# Patient Record
Sex: FEMALE | Race: WHITE | Employment: OTHER | RURAL
[De-identification: names, ages, dates, MRNs, and addresses within clinical notes are randomized per-mention and may not be internally consistent; named-entity substitution may affect disease eponyms.]

---

## 2017-01-19 ENCOUNTER — HOSPITAL ENCOUNTER (OUTPATIENT)
Dept: OTHER | Age: 82
Discharge: HOME | End: 2017-01-19
Attending: NURSE PRACTITIONER | Admitting: NURSE PRACTITIONER

## 2017-01-19 DIAGNOSIS — R09.89 ABNORMAL CHEST SOUNDS: ICD-10-CM

## 2017-03-17 PROBLEM — E78.5 HYPERLIPIDEMIA: Status: ACTIVE | Noted: 2017-03-17

## 2017-03-17 PROBLEM — G45.9 TIA (TRANSIENT ISCHEMIC ATTACK): Status: ACTIVE | Noted: 2017-03-17

## 2017-03-17 PROBLEM — I10 HYPERTENSION: Status: ACTIVE | Noted: 2017-03-17

## 2017-03-17 PROBLEM — I25.10 CAD (CORONARY ARTERY DISEASE): Status: ACTIVE | Noted: 2017-03-17

## 2017-03-17 PROBLEM — I48.91 ATRIAL FIBRILLATION (HCC): Status: ACTIVE | Noted: 2017-03-17

## 2017-06-02 ENCOUNTER — OFFICE VISIT (OUTPATIENT)
Dept: ONCOLOGY | Facility: CLINIC | Age: 82
End: 2017-06-02

## 2017-06-02 ENCOUNTER — LAB (OUTPATIENT)
Dept: LAB | Facility: HOSPITAL | Age: 82
End: 2017-06-02

## 2017-06-02 VITALS
SYSTOLIC BLOOD PRESSURE: 175 MMHG | TEMPERATURE: 97.5 F | WEIGHT: 134 LBS | HEART RATE: 70 BPM | DIASTOLIC BLOOD PRESSURE: 72 MMHG | RESPIRATION RATE: 19 BRPM

## 2017-06-02 DIAGNOSIS — D50.9 IRON DEFICIENCY ANEMIA, UNSPECIFIED IRON DEFICIENCY ANEMIA TYPE: ICD-10-CM

## 2017-06-02 DIAGNOSIS — D50.9 IRON DEFICIENCY ANEMIA, UNSPECIFIED IRON DEFICIENCY ANEMIA TYPE: Primary | ICD-10-CM

## 2017-06-02 LAB
BASOPHILS # BLD AUTO: 0.02 10*3/MM3 (ref 0–0.2)
BASOPHILS NFR BLD AUTO: 0.3 % (ref 0–2.5)
DEPRECATED RDW RBC AUTO: 48 FL (ref 37–54)
EOSINOPHIL # BLD AUTO: 0.24 10*3/MM3 (ref 0–0.7)
EOSINOPHIL NFR BLD AUTO: 3 % (ref 0–7)
ERYTHROCYTE [DISTWIDTH] IN BLOOD BY AUTOMATED COUNT: 14 % (ref 11.5–14.5)
FERRITIN SERPL-MCNC: 15.6 NG/ML (ref 11.1–264)
HCT VFR BLD AUTO: 33.5 % (ref 37–47)
HGB BLD-MCNC: 10.4 G/DL (ref 12–16)
IMM GRANULOCYTES # BLD: 0.04 10*3/MM3 (ref 0–0.06)
IMM GRANULOCYTES NFR BLD: 0.5 % (ref 0–0.6)
IRON 24H UR-MRATE: 53 MCG/DL (ref 37–181)
IRON SATN MFR SERPL: 16 % (ref 11–46)
LYMPHOCYTES # BLD AUTO: 1.25 10*3/MM3 (ref 0.6–3.4)
LYMPHOCYTES NFR BLD AUTO: 15.8 % (ref 10–50)
MCH RBC QN AUTO: 29.3 PG (ref 27–31)
MCHC RBC AUTO-ENTMCNC: 31 G/DL (ref 30–37)
MCV RBC AUTO: 94.4 FL (ref 81–99)
MONOCYTES # BLD AUTO: 0.71 10*3/MM3 (ref 0–0.9)
MONOCYTES NFR BLD AUTO: 9 % (ref 0–12)
NEUTROPHILS # BLD AUTO: 5.67 10*3/MM3 (ref 2–6.9)
NEUTROPHILS NFR BLD AUTO: 71.4 % (ref 37–80)
NRBC BLD MANUAL-RTO: 0 /100 WBC (ref 0–0)
PLATELET # BLD AUTO: 200 10*3/MM3 (ref 130–400)
PMV BLD AUTO: 10.7 FL (ref 6–12)
RBC # BLD AUTO: 3.55 10*6/MM3 (ref 4.2–5.4)
TIBC SERPL-MCNC: 329 MCG/DL (ref 261–497)
WBC NRBC COR # BLD: 7.93 10*3/MM3 (ref 4.8–10.8)

## 2017-06-02 PROCEDURE — 83550 IRON BINDING TEST: CPT | Performed by: INTERNAL MEDICINE

## 2017-06-02 PROCEDURE — 99213 OFFICE O/P EST LOW 20 MIN: CPT | Performed by: INTERNAL MEDICINE

## 2017-06-02 PROCEDURE — 36415 COLL VENOUS BLD VENIPUNCTURE: CPT

## 2017-06-02 PROCEDURE — 83540 ASSAY OF IRON: CPT | Performed by: INTERNAL MEDICINE

## 2017-06-02 PROCEDURE — 82728 ASSAY OF FERRITIN: CPT | Performed by: INTERNAL MEDICINE

## 2017-06-02 PROCEDURE — 85025 COMPLETE CBC W/AUTO DIFF WBC: CPT | Performed by: INTERNAL MEDICINE

## 2017-06-02 NOTE — PROGRESS NOTES
PROBLEM LIST:  1. Iron deficiency anemia:   a) The patient it initially presented with an incidental finding of a  hemoglobin of 6 on routine labs in 09/2012. She received a blood transfusion  and was started on oral iron. Upper endoscopy reportedly was unremarkable at  that time. She believes she also had a CT scan of the abdomen. Colonoscopy was  done in 12/2013, which did not show any evidence of blood loss. Her hemoglobin  got up to 12 to 13 with iron supplementation. It is dropping again and she was  initially seen for consultation in 10/2014 with a hemoglobin of 9.4, ferritin  9, iron saturation 4%.   b) Feraheme infusion was given 11/2014.  c) Capsule endoscopy on 12/11/2014 showed evidence of hematin in the stomach  and duodenal erosions. Repeat push enteroscopy was recommended. Repeat upper  endoscopy on 04/06/2015 showed 2 ulcerated lesions in the gastric antrum.   Biopsies were performed and a PPI was started.   d) Feraheme infusion given in 05/2015.   e) Feraheme infusion repeated in 09/2015 for dropping ferritin.   2. Diabetes.   3. Hypertension.   4. Atrial fibrillation.   5. Hyperlipidemia.   6. History of transient ischemic attack.   7. Coronary artery disease.     Subjective     HISTORY OF PRESENT ILLNESS:   Analliia De Leon returns for follow-up.   She is doing pretty well.  She thinks she feels about the same.  She would like to have more energy.  She continues to take ferrous sulfate twice daily.    Past Medical History, Past Surgical History, Social History, Family History have been reviewed and are without significant changes except as mentioned.    Review of Systems   A comprehensive 14 point review of systems was performed and was negative except as mentioned.    Medications:  The current medication list was reviewed in the EMR    ALLERGIES:    Allergies   Allergen Reactions   • Amoxicillin    • Crestor [Rosuvastatin]    • Eliquis [Apixaban]    • Keflex [Cephalexin]    • Motrin [Ibuprofen]     • Vasotec [Enalapril Maleate]        Objective      /72  Pulse 70  Temp 97.5 °F (36.4 °C) (Temporal Artery )   Resp 19  Wt 134 lb (60.8 kg)     Performance Status: 1    General: well appearing female in no acute distress  Neuro: alert and oriented  HEENT: sclera anicteric, oropharynx clear  Lymphatics: no cervical, supraclavicular, or axillary adenopathy  Cardiovascular: regular rate and rhythm, no murmurs  Lungs: clear to auscultation bilaterally  Abdomen: soft, nontender, nondistended.  No palpable organomegaly  Extremeties: Well healed left anterior knee scar.  Skin: no rashes, lesions, bruising, or petechiae  Psych: mood and affect appropriate      RECENT LABS:  Blood work was reviewed and is notable for [id number], ferritin 15.6, iron sat 16%         Assessment/Plan   Analilia De Leon is a 82 y.o. year old female with a history of chronic iron deficiency anemia.  Her hemoglobin remains stable.  Her iron levels are normal.  I recommend that she continue taking the iron supplement twice daily.  I will not schedule hematology follow up but i would be happy to see her at any point in the future if there is a significant change in her blood work.  I recommend that she get CBC and iron studies checked twice yearly.                    Visit time was 15 minutes, greater than 50% spent in counseling      Keila Doran MD  [facility]    6/2/2017          CC:

## 2017-06-06 ENCOUNTER — OFFICE VISIT (OUTPATIENT)
Dept: NEUROLOGY | Facility: CLINIC | Age: 82
End: 2017-06-06

## 2017-06-06 VITALS
OXYGEN SATURATION: 98 % | HEIGHT: 63 IN | SYSTOLIC BLOOD PRESSURE: 155 MMHG | HEART RATE: 75 BPM | BODY MASS INDEX: 23.39 KG/M2 | DIASTOLIC BLOOD PRESSURE: 87 MMHG | WEIGHT: 132 LBS

## 2017-06-06 DIAGNOSIS — R27.0 ATAXIA: ICD-10-CM

## 2017-06-06 DIAGNOSIS — H81.10 BPPV (BENIGN PAROXYSMAL POSITIONAL VERTIGO), UNSPECIFIED LATERALITY: Primary | ICD-10-CM

## 2017-06-06 DIAGNOSIS — I73.9 MICROANGIOPATHY (HCC): ICD-10-CM

## 2017-06-06 DIAGNOSIS — R53.1 WEAKNESS: ICD-10-CM

## 2017-06-06 PROCEDURE — 99215 OFFICE O/P EST HI 40 MIN: CPT | Performed by: PSYCHIATRY & NEUROLOGY

## 2017-06-06 NOTE — PROGRESS NOTES
Subjective:     Patient ID: Analilia De Leon is a 82 y.o. female.    History of Present Illness  The following portions of the patient's history were reviewed and updated as appropriate: allergies, current medications, past family history, past medical history, past social history, past surgical history and problem list.  83 y/o WF has had intermittent dizziness and vertigo for several months, feels swimmy headed with movement lasting for a few seconds. She has been using a walker and a wheelchair in part because of history of knee surgery. Denies history of a stroke. MRI of brain in April showed chronic white matter changes, question of a tiny acute lacunar stroke vs artifact. She has history of Afib, sees cardiology, on Xarelto.  Review of Systems   Constitutional: Positive for fatigue. Negative for chills, fever and unexpected weight change.   HENT: Negative for ear pain, hearing loss, nosebleeds, rhinorrhea and sore throat.    Eyes: Negative for photophobia, pain, discharge, itching and visual disturbance.   Respiratory: Negative for cough, chest tightness, shortness of breath and wheezing.    Cardiovascular: Negative for chest pain, palpitations and leg swelling.   Gastrointestinal: Negative for abdominal pain, blood in stool, constipation, diarrhea, nausea and vomiting.   Genitourinary: Positive for vaginal discharge. Negative for dysuria, frequency, hematuria and urgency.   Musculoskeletal: Negative for arthralgias, back pain, gait problem, joint swelling, myalgias, neck pain and neck stiffness.   Skin: Negative for rash and wound.        DRY SKIN   Allergic/Immunologic: Negative for environmental allergies and food allergies.   Neurological: Negative for dizziness, tremors, seizures, syncope, speech difficulty, weakness, light-headedness, numbness and headaches.   Hematological: Negative for adenopathy. Does not bruise/bleed easily.   Psychiatric/Behavioral: Negative for agitation, confusion, decreased  concentration, hallucinations, sleep disturbance and suicidal ideas. The patient is not nervous/anxious.         Objective:    Neurologic Exam     Mental Status   Oriented to person, place, and time.     Cranial Nerves     CN III, IV, VI   Pupils are equal, round, and reactive to light.  Extraocular motions are normal.     Motor Exam     Strength   Strength 5/5 throughout.       Physical Exam   Constitutional: She is oriented to person, place, and time.   Poorly hydrated   HENT:   Head: Normocephalic and atraumatic.   Eyes: EOM are normal. Pupils are equal, round, and reactive to light.   Neck: Carotid bruit is not present.   Cardiovascular: Normal rate, regular rhythm, normal heart sounds and intact distal pulses.    Pulmonary/Chest: Effort normal and breath sounds normal.   Abdominal: Soft. Bowel sounds are normal.   Musculoskeletal: Normal range of motion.   Neurological: She is alert and oriented to person, place, and time. She has normal strength and normal reflexes. No cranial nerve deficit or sensory deficit. Coordination and gait normal. She displays no Babinski's sign on the right side. She displays no Babinski's sign on the left side.   Gets up with difficulty, unsteady gait, severe inducible vertigo and nystagmus with right ear down, again on sitting up, resolves in seconds.   Skin: Skin is warm.   Psychiatric: She has a normal mood and affect. Her behavior is normal. Thought content normal. Cognition and memory are normal.       Assessment/Plan:     Analilia was seen today for dizziness.    Diagnoses and all orders for this visit:    BPPV (benign paroxysmal positional vertigo), unspecified laterality  -     Ambulatory Referral to Physical Therapy    Ataxia  -     Ambulatory Referral to Physical Therapy    Weakness  -     Ambulatory Referral to Physical Therapy    Microangiopathy       Patient would benefit from vestibular therapy, Epley's maneuver, balance/gait training and strengthening. She is to exercise  fall precautions. Patient and son are encouraged to call for problems.

## 2018-07-26 ENCOUNTER — TRANSCRIBE ORDERS (OUTPATIENT)
Dept: ADMINISTRATIVE | Facility: HOSPITAL | Age: 83
End: 2018-07-26

## 2018-07-26 DIAGNOSIS — Z12.39 SCREENING BREAST EXAMINATION: Primary | ICD-10-CM

## 2018-09-06 ENCOUNTER — APPOINTMENT (OUTPATIENT)
Dept: MAMMOGRAPHY | Facility: HOSPITAL | Age: 83
End: 2018-09-06

## 2018-10-04 ENCOUNTER — HOSPITAL ENCOUNTER (OUTPATIENT)
Dept: MAMMOGRAPHY | Facility: HOSPITAL | Age: 83
Discharge: HOME | End: 2018-10-04
Admitting: NURSE PRACTITIONER

## 2018-10-04 DIAGNOSIS — Z12.39 SCREENING BREAST EXAMINATION: ICD-10-CM

## 2018-10-04 PROCEDURE — 77063 BREAST TOMOSYNTHESIS BI: CPT

## 2018-10-04 PROCEDURE — 77067 SCR MAMMO BI INCL CAD: CPT

## 2020-08-11 ENCOUNTER — HOSPITAL ENCOUNTER (INPATIENT)
Facility: HOSPITAL | Age: 85
LOS: 8 days | Discharge: NURSING, REHAB OR LONG-TERM CARE | End: 2020-08-19
Attending: FAMILY MEDICINE | Admitting: ORTHOPAEDIC SURGERY

## 2020-08-11 ENCOUNTER — APPOINTMENT (OUTPATIENT)
Dept: GENERAL RADIOLOGY | Facility: HOSPITAL | Age: 85
End: 2020-08-11

## 2020-08-11 ENCOUNTER — APPOINTMENT (OUTPATIENT)
Dept: CT IMAGING | Facility: HOSPITAL | Age: 85
End: 2020-08-11

## 2020-08-11 ENCOUNTER — APPOINTMENT (OUTPATIENT)
Dept: OTHER | Facility: HOSPITAL | Age: 85
End: 2020-08-11

## 2020-08-11 DIAGNOSIS — Z78.9 IMPAIRED MOBILITY AND ADLS: ICD-10-CM

## 2020-08-11 DIAGNOSIS — Z74.09 IMPAIRED MOBILITY AND ADLS: ICD-10-CM

## 2020-08-11 DIAGNOSIS — E11.42 DIABETIC PERIPHERAL NEUROPATHY (HCC): ICD-10-CM

## 2020-08-11 DIAGNOSIS — Z96.649 STATUS POST HIP HEMIARTHROPLASTY: ICD-10-CM

## 2020-08-11 DIAGNOSIS — E87.5 HYPERKALEMIA: Primary | ICD-10-CM

## 2020-08-11 PROBLEM — S72.009A HIP FRACTURE (HCC): Status: ACTIVE | Noted: 2020-08-11

## 2020-08-11 PROBLEM — R27.0 ATAXIA: Status: RESOLVED | Noted: 2017-06-06 | Resolved: 2020-08-11

## 2020-08-11 PROBLEM — R41.82 ALTERED MENTAL STATUS: Status: ACTIVE | Noted: 2020-08-11

## 2020-08-11 PROBLEM — I73.9 MICROANGIOPATHY (HCC): Status: RESOLVED | Noted: 2017-06-06 | Resolved: 2020-08-11
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
## 2020-08-11 PROBLEM — H81.10 BPPV (BENIGN PAROXYSMAL POSITIONAL VERTIGO): Status: RESOLVED | Noted: 2017-06-06 | Resolved: 2020-08-11

## 2020-08-11 PROBLEM — G45.9 TIA (TRANSIENT ISCHEMIC ATTACK): Status: RESOLVED | Noted: 2017-03-17 | Resolved: 2020-08-11

## 2020-08-11 PROBLEM — R53.1 WEAKNESS: Status: RESOLVED | Noted: 2017-06-06 | Resolved: 2020-08-11

## 2020-08-11 PROBLEM — N39.0 ACUTE UTI (URINARY TRACT INFECTION): Status: ACTIVE | Noted: 2020-08-11

## 2020-08-11 LAB
ALBUMIN SERPL-MCNC: 4.1 G/DL (ref 3.5–5.2)
ALBUMIN/GLOB SERPL: 1.8 G/DL
ALP SERPL-CCNC: 100 U/L (ref 39–117)
ALT SERPL W P-5'-P-CCNC: 15 U/L (ref 1–33)
ANION GAP SERPL CALCULATED.3IONS-SCNC: 11 MMOL/L (ref 5–15)
APTT PPP: 36.7 SECONDS (ref 50–95)
AST SERPL-CCNC: 23 U/L (ref 1–32)
BASOPHILS # BLD AUTO: 0.03 10*3/MM3 (ref 0–0.2)
BASOPHILS NFR BLD AUTO: 0.2 % (ref 0–1.5)
BILIRUB SERPL-MCNC: 0.3 MG/DL (ref 0–1.2)
BUN SERPL-MCNC: 13 MG/DL (ref 8–23)
BUN/CREAT SERPL: 13.1 (ref 7–25)
CALCIUM SPEC-SCNC: 9.2 MG/DL (ref 8.6–10.5)
CHLORIDE SERPL-SCNC: 106 MMOL/L (ref 98–107)
CO2 SERPL-SCNC: 21 MMOL/L (ref 22–29)
CREAT SERPL-MCNC: 0.99 MG/DL (ref 0.57–1)
DEPRECATED RDW RBC AUTO: 50.5 FL (ref 37–54)
EOSINOPHIL # BLD AUTO: 0.29 10*3/MM3 (ref 0–0.4)
EOSINOPHIL NFR BLD AUTO: 2 % (ref 0.3–6.2)
ERYTHROCYTE [DISTWIDTH] IN BLOOD BY AUTOMATED COUNT: 14.6 % (ref 12.3–15.4)
GFR SERPL CREATININE-BSD FRML MDRD: 53 ML/MIN/1.73
GLOBULIN UR ELPH-MCNC: 2.3 GM/DL
GLUCOSE SERPL-MCNC: 178 MG/DL (ref 65–99)
HCT VFR BLD AUTO: 32 % (ref 34–46.6)
HGB BLD-MCNC: 9.3 G/DL (ref 12–15.9)
HOLD SPECIMEN: NORMAL
IMM GRANULOCYTES # BLD AUTO: 0.09 10*3/MM3 (ref 0–0.05)
IMM GRANULOCYTES NFR BLD AUTO: 0.6 % (ref 0–0.5)
INR PPP: 1.28 (ref 0.85–1.16)
LYMPHOCYTES # BLD AUTO: 0.68 10*3/MM3 (ref 0.7–3.1)
LYMPHOCYTES NFR BLD AUTO: 4.7 % (ref 19.6–45.3)
MAGNESIUM SERPL-MCNC: 1.6 MG/DL (ref 1.6–2.4)
MCH RBC QN AUTO: 27.8 PG (ref 26.6–33)
MCHC RBC AUTO-ENTMCNC: 29.1 G/DL (ref 31.5–35.7)
MCV RBC AUTO: 95.8 FL (ref 79–97)
MONOCYTES # BLD AUTO: 1.37 10*3/MM3 (ref 0.1–0.9)
MONOCYTES NFR BLD AUTO: 9.4 % (ref 5–12)
NEUTROPHILS NFR BLD AUTO: 12.12 10*3/MM3 (ref 1.7–7)
NEUTROPHILS NFR BLD AUTO: 83.1 % (ref 42.7–76)
NRBC BLD AUTO-RTO: 0 /100 WBC (ref 0–0.2)
PLATELET # BLD AUTO: 225 10*3/MM3 (ref 140–450)
PMV BLD AUTO: 11.3 FL (ref 6–12)
POTASSIUM SERPL-SCNC: 5.6 MMOL/L (ref 3.5–5.2)
PROCALCITONIN SERPL-MCNC: 0.07 NG/ML (ref 0–0.25)
PROT SERPL-MCNC: 6.4 G/DL (ref 6–8.5)
PROTHROMBIN TIME: 15.7 SECONDS (ref 11.5–14)
RBC # BLD AUTO: 3.34 10*6/MM3 (ref 3.77–5.28)
SODIUM SERPL-SCNC: 138 MMOL/L (ref 136–145)
TROPONIN T SERPL-MCNC: <0.01 NG/ML (ref 0–0.03)
WBC # BLD AUTO: 14.58 10*3/MM3 (ref 3.4–10.8)
WHOLE BLOOD HOLD SPECIMEN: NORMAL
WHOLE BLOOD HOLD SPECIMEN: NORMAL

## 2020-08-11 PROCEDURE — 99223 1ST HOSP IP/OBS HIGH 75: CPT | Performed by: INTERNAL MEDICINE

## 2020-08-11 PROCEDURE — 80053 COMPREHEN METABOLIC PANEL: CPT | Performed by: NURSE PRACTITIONER

## 2020-08-11 PROCEDURE — 82962 GLUCOSE BLOOD TEST: CPT

## 2020-08-11 PROCEDURE — 93005 ELECTROCARDIOGRAM TRACING: CPT | Performed by: INTERNAL MEDICINE

## 2020-08-11 PROCEDURE — 83735 ASSAY OF MAGNESIUM: CPT | Performed by: NURSE PRACTITIONER

## 2020-08-11 PROCEDURE — 71045 X-RAY EXAM CHEST 1 VIEW: CPT

## 2020-08-11 PROCEDURE — 87040 BLOOD CULTURE FOR BACTERIA: CPT | Performed by: NURSE PRACTITIONER

## 2020-08-11 PROCEDURE — 93005 ELECTROCARDIOGRAM TRACING: CPT | Performed by: NURSE PRACTITIONER

## 2020-08-11 PROCEDURE — 25010000002 MORPHINE PER 10 MG: Performed by: INTERNAL MEDICINE

## 2020-08-11 PROCEDURE — 84484 ASSAY OF TROPONIN QUANT: CPT | Performed by: NURSE PRACTITIONER

## 2020-08-11 PROCEDURE — 86850 RBC ANTIBODY SCREEN: CPT | Performed by: NURSE PRACTITIONER

## 2020-08-11 PROCEDURE — 86901 BLOOD TYPING SEROLOGIC RH(D): CPT | Performed by: NURSE PRACTITIONER

## 2020-08-11 PROCEDURE — 93010 ELECTROCARDIOGRAM REPORT: CPT | Performed by: INTERNAL MEDICINE

## 2020-08-11 PROCEDURE — 85730 THROMBOPLASTIN TIME PARTIAL: CPT | Performed by: NURSE PRACTITIONER

## 2020-08-11 PROCEDURE — 70450 CT HEAD/BRAIN W/O DYE: CPT

## 2020-08-11 PROCEDURE — 85025 COMPLETE CBC W/AUTO DIFF WBC: CPT | Performed by: NURSE PRACTITIONER

## 2020-08-11 PROCEDURE — 84145 PROCALCITONIN (PCT): CPT | Performed by: NURSE PRACTITIONER

## 2020-08-11 PROCEDURE — 86900 BLOOD TYPING SEROLOGIC ABO: CPT | Performed by: NURSE PRACTITIONER

## 2020-08-11 PROCEDURE — 85610 PROTHROMBIN TIME: CPT | Performed by: NURSE PRACTITIONER

## 2020-08-11 RX ORDER — SODIUM CHLORIDE 0.9 % (FLUSH) 0.9 %
10 SYRINGE (ML) INJECTION AS NEEDED
Status: DISCONTINUED | OUTPATIENT
Start: 2020-08-11 | End: 2020-08-19 | Stop reason: HOSPADM

## 2020-08-11 RX ORDER — SODIUM CHLORIDE 0.9 % (FLUSH) 0.9 %
10 SYRINGE (ML) INJECTION EVERY 12 HOURS SCHEDULED
Status: DISCONTINUED | OUTPATIENT
Start: 2020-08-11 | End: 2020-08-19 | Stop reason: HOSPADM

## 2020-08-11 RX ORDER — DEXTROSE MONOHYDRATE 25 G/50ML
25 INJECTION, SOLUTION INTRAVENOUS
Status: DISCONTINUED | OUTPATIENT
Start: 2020-08-11 | End: 2020-08-19 | Stop reason: HOSPADM

## 2020-08-11 RX ORDER — MORPHINE SULFATE 2 MG/ML
2 INJECTION, SOLUTION INTRAMUSCULAR; INTRAVENOUS
Status: DISPENSED | OUTPATIENT
Start: 2020-08-11 | End: 2020-08-13

## 2020-08-11 RX ORDER — NICOTINE POLACRILEX 4 MG
15 LOZENGE BUCCAL
Status: DISCONTINUED | OUTPATIENT
Start: 2020-08-11 | End: 2020-08-19 | Stop reason: HOSPADM

## 2020-08-11 RX ORDER — NALOXONE HCL 0.4 MG/ML
0.2 VIAL (ML) INJECTION AS NEEDED
Status: DISCONTINUED | OUTPATIENT
Start: 2020-08-11 | End: 2020-08-19 | Stop reason: HOSPADM

## 2020-08-11 RX ORDER — SODIUM CHLORIDE 9 MG/ML
75 INJECTION, SOLUTION INTRAVENOUS ONCE
Status: COMPLETED | OUTPATIENT
Start: 2020-08-11 | End: 2020-08-12

## 2020-08-11 RX ORDER — METOPROLOL TARTRATE 5 MG/5ML
2.5 INJECTION INTRAVENOUS ONCE
Status: DISCONTINUED | OUTPATIENT
Start: 2020-08-11 | End: 2020-08-12 | Stop reason: SDUPTHER

## 2020-08-11 RX ORDER — MORPHINE SULFATE 4 MG/ML
3 INJECTION, SOLUTION INTRAMUSCULAR; INTRAVENOUS EVERY 4 HOURS PRN
Status: DISPENSED | OUTPATIENT
Start: 2020-08-11 | End: 2020-08-18

## 2020-08-11 RX ADMIN — MORPHINE SULFATE 3 MG: 4 INJECTION, SOLUTION INTRAMUSCULAR; INTRAVENOUS at 19:59

## 2020-08-11 NOTE — NURSING NOTE
"  ACC REVIEW REPORT: [facility]        PATIENT NAME: Analilia De Leon    PATIENT ID: [id number]        COVID-19 ACC SCREENING       DOES THE PATIENT HAVE A FEVER GREATER THAN OR EQUAL .4: no    IS THE PATIENT EXPERIENCING SHORTNESS OF BREATH: no    DOES THE PATIENT HAVE A COUGH: no    DOES THE PATIENT HAVE ANY OF THE FOLLOWING RISK FACTORS:    EXPOSURE TO SUSPECTED OR KNOWN COVID-19: no    RECENT TRAVEL HISTORY TO ENDEMIC AREA (DOMESTIC/LOCAL): no    IS THE PATIENT A HEALTHCARE WORKER: no    HAS THE PATIENT BEEN TESTED FOR COVID-19: no    DATE TESTED:     LAB TESTING SENT TO:           BED: S 504    BED TYPE: 5F    BED GIVEN TO: Rae FERNÁNDEZ BED GIVEN: 1615    YOB: 1935    AGE: 85    GENDER: F    PREVIOUS ADMIT TO [facility]: yes    PREVIOUS ADMISSION DATE: 2016    PATIENT CLASS:     TODAY'S DATE: 8/11/2020    TRANSFER DATE: 8-11-20    ETA: ~1830    TRANSFERRING FACILITY: [facility]    TRANSFERRING FACILITY PHONE # : [phone]    TRANSFERRING MD: Campbell    DATE/TIME REQUEST RECEIVED: 8-10-20@1600    [facility] RN: Chata Graham    REPORT FROM: Rae Jessica    TIME REPORT TAKEN: 1615    DIAGNOSIS: right hip fx    REASON FOR TRANSFER TO [facility]: higher level of care    TRANSPORTATION: EMS    CLINICAL REASON FOR TRANSFER TO [facility]: 85 y.o. Presented to OSH 8/10 with a fx hip- she was dizzy & fell at home; plans are for surgery 8/12      CLINICAL INFORMATION    HEIGHT: 5'3\"    WEIGHT: 65.32kg    ALLERGIES: amoxicillin, crestor, tramadol, eliquis, keflex, motrin, vasotec    BELCHER: yes, placed in the ED    INFECTIOUS DISEASE:     ISOLATION:     LAST VITAL SIGNS:  TIME: 1615  TEMP: 97.7  PULSE: 70  B/P: 145/68  RESP:     LAB INFORMATION: fsbs today at 1427: 180                                       Ua - + nitrite, 2+ bacteria    CULTURE INFORMATION: urine cx - >100,000 ecoli    MEDS/IV FLUIDS: #18 left ac - SL  meds given in the ED: rocephin x2 doses (last dose was 1gm given at 1456)      "                                     Dilaudid given at 10:36                                          Gabapentin given at 0958                                          keppra (500mg given at 0958 - pt takes 1gm at hs      CARDIAC SYSTEM:    CHEST PAIN: no    RHYTHM: NSR    Is patient taking or has patient been given any drugs that could increase bleeding? yes  (Plavix, Brilinta, Effient, Eliquis, Xarelto, Warfarin, Integrilin, Angiomax)    DRUG: xarelto DOSE/FREQUENCY: last taken 8/10    CARDIAC NOTES: no dizziness reported since in the ED      RESPIRATORY SYSTEM:    LUNG SOUNDS:    OXYGEN: 2 liters    O2 SAT: 100%    RESPIRATORY STATUS: no soa      CNS/MUSCULOSKELETAL    ALERT AND ORIENTED:  Pt has been drowsy since taking dilaudid this morning    CNS/MUSCULOSKELETAL NOTES:      GI//GY      ABDOMINAL PAIN: no    VOMITING: no    DIARRHEA: no    NAUSEA: no    /GY NOTES: pt was held NPO for a while awaiting on decision re: surgery  She's had about 50cc intake today  She's had 600cc UO today    PAST MEDICAL HISTORY: htn, afib, HLD, TIA, cad, DM, BPPV, ataxia, iron deficiency anemia, Rt TKR, GB surgery, hysterectomy, appy    ADDITIONAL NOTES: pt lives at home          Sheron Graham RN  8/11/2020  16:27

## 2020-08-12 ENCOUNTER — ANESTHESIA (OUTPATIENT)
Dept: PERIOP | Facility: HOSPITAL | Age: 85
End: 2020-08-12

## 2020-08-12 ENCOUNTER — APPOINTMENT (OUTPATIENT)
Dept: GENERAL RADIOLOGY | Facility: HOSPITAL | Age: 85
End: 2020-08-12

## 2020-08-12 ENCOUNTER — ANESTHESIA EVENT (OUTPATIENT)
Dept: PERIOP | Facility: HOSPITAL | Age: 85
End: 2020-08-12

## 2020-08-12 PROBLEM — Z96.649 STATUS POST HIP HEMIARTHROPLASTY: Status: ACTIVE | Noted: 2020-08-12

## 2020-08-12 LAB
ABO GROUP BLD: NORMAL
ANION GAP SERPL CALCULATED.3IONS-SCNC: 10 MMOL/L (ref 5–15)
BACTERIA UR QL AUTO: ABNORMAL /HPF
BASOPHILS # BLD AUTO: 0.03 10*3/MM3 (ref 0–0.2)
BASOPHILS NFR BLD AUTO: 0.2 % (ref 0–1.5)
BILIRUB UR QL STRIP: NEGATIVE
BLD GP AB SCN SERPL QL: NEGATIVE
BUN SERPL-MCNC: 18 MG/DL (ref 8–23)
BUN/CREAT SERPL: 20 (ref 7–25)
CALCIUM SPEC-SCNC: 8.9 MG/DL (ref 8.6–10.5)
CHLORIDE SERPL-SCNC: 106 MMOL/L (ref 98–107)
CLARITY UR: CLEAR
CO2 SERPL-SCNC: 24 MMOL/L (ref 22–29)
COLOR UR: YELLOW
CREAT SERPL-MCNC: 0.9 MG/DL (ref 0.57–1)
D-LACTATE SERPL-SCNC: 1 MMOL/L (ref 0.5–2)
DEPRECATED RDW RBC AUTO: 51.8 FL (ref 37–54)
EOSINOPHIL # BLD AUTO: 0.25 10*3/MM3 (ref 0–0.4)
EOSINOPHIL NFR BLD AUTO: 2.1 % (ref 0.3–6.2)
ERYTHROCYTE [DISTWIDTH] IN BLOOD BY AUTOMATED COUNT: 14.6 % (ref 12.3–15.4)
GFR SERPL CREATININE-BSD FRML MDRD: 60 ML/MIN/1.73
GLUCOSE BLDC GLUCOMTR-MCNC: 159 MG/DL (ref 70–130)
GLUCOSE BLDC GLUCOMTR-MCNC: 161 MG/DL (ref 70–130)
GLUCOSE BLDC GLUCOMTR-MCNC: 189 MG/DL (ref 70–130)
GLUCOSE BLDC GLUCOMTR-MCNC: 199 MG/DL (ref 70–130)
GLUCOSE BLDC GLUCOMTR-MCNC: 200 MG/DL (ref 70–130)
GLUCOSE SERPL-MCNC: 150 MG/DL (ref 65–99)
GLUCOSE UR STRIP-MCNC: NEGATIVE MG/DL
HBA1C MFR BLD: 6.8 % (ref 4.8–5.6)
HCT VFR BLD AUTO: 30.3 % (ref 34–46.6)
HGB BLD-MCNC: 8.7 G/DL (ref 12–15.9)
HGB UR QL STRIP.AUTO: ABNORMAL
HYALINE CASTS UR QL AUTO: ABNORMAL /LPF
IMM GRANULOCYTES # BLD AUTO: 0.07 10*3/MM3 (ref 0–0.05)
IMM GRANULOCYTES NFR BLD AUTO: 0.6 % (ref 0–0.5)
KETONES UR QL STRIP: ABNORMAL
LEUKOCYTE ESTERASE UR QL STRIP.AUTO: ABNORMAL
LYMPHOCYTES # BLD AUTO: 0.61 10*3/MM3 (ref 0.7–3.1)
LYMPHOCYTES NFR BLD AUTO: 5 % (ref 19.6–45.3)
MCH RBC QN AUTO: 27.9 PG (ref 26.6–33)
MCHC RBC AUTO-ENTMCNC: 28.7 G/DL (ref 31.5–35.7)
MCV RBC AUTO: 97.1 FL (ref 79–97)
MONOCYTES # BLD AUTO: 1.28 10*3/MM3 (ref 0.1–0.9)
MONOCYTES NFR BLD AUTO: 10.5 % (ref 5–12)
NEUTROPHILS NFR BLD AUTO: 81.6 % (ref 42.7–76)
NEUTROPHILS NFR BLD AUTO: 9.91 10*3/MM3 (ref 1.7–7)
NITRITE UR QL STRIP: NEGATIVE
NRBC BLD AUTO-RTO: 0 /100 WBC (ref 0–0.2)
PH UR STRIP.AUTO: <=5 [PH] (ref 5–8)
PLATELET # BLD AUTO: 206 10*3/MM3 (ref 140–450)
PMV BLD AUTO: 11.1 FL (ref 6–12)
POTASSIUM SERPL-SCNC: 5 MMOL/L (ref 3.5–5.2)
PROT UR QL STRIP: ABNORMAL
RBC # BLD AUTO: 3.12 10*6/MM3 (ref 3.77–5.28)
RBC # UR: ABNORMAL /HPF
REF LAB TEST METHOD: ABNORMAL
RH BLD: POSITIVE
SARS-COV-2 RDRP RESP QL NAA+PROBE: NOT DETECTED
SODIUM SERPL-SCNC: 140 MMOL/L (ref 136–145)
SP GR UR STRIP: 1.01 (ref 1–1.03)
SQUAMOUS #/AREA URNS HPF: ABNORMAL /HPF
T&S EXPIRATION DATE: NORMAL
TRANS CELLS #/AREA URNS HPF: ABNORMAL /HPF
TSH SERPL DL<=0.05 MIU/L-ACNC: 3.97 UIU/ML (ref 0.27–4.2)
UROBILINOGEN UR QL STRIP: ABNORMAL
WBC # BLD AUTO: 12.15 10*3/MM3 (ref 3.4–10.8)
WBC UR QL AUTO: ABNORMAL /HPF

## 2020-08-12 PROCEDURE — 83605 ASSAY OF LACTIC ACID: CPT | Performed by: NURSE PRACTITIONER

## 2020-08-12 PROCEDURE — 82962 GLUCOSE BLOOD TEST: CPT

## 2020-08-12 PROCEDURE — 0SRR019 REPLACEMENT OF RIGHT HIP JOINT, FEMORAL SURFACE WITH METAL SYNTHETIC SUBSTITUTE, CEMENTED, OPEN APPROACH: ICD-10-PCS | Performed by: ORTHOPAEDIC SURGERY

## 2020-08-12 PROCEDURE — 99233 SBSQ HOSP IP/OBS HIGH 50: CPT | Performed by: INTERNAL MEDICINE

## 2020-08-12 PROCEDURE — 25010000002 CEFTRIAXONE PER 250 MG: Performed by: NURSE PRACTITIONER

## 2020-08-12 PROCEDURE — 87086 URINE CULTURE/COLONY COUNT: CPT | Performed by: NURSE PRACTITIONER

## 2020-08-12 PROCEDURE — 25010000002 DEXAMETHASONE PER 1 MG: Performed by: NURSE ANESTHETIST, CERTIFIED REGISTERED

## 2020-08-12 PROCEDURE — 81001 URINALYSIS AUTO W/SCOPE: CPT | Performed by: NURSE PRACTITIONER

## 2020-08-12 PROCEDURE — 85025 COMPLETE CBC W/AUTO DIFF WBC: CPT | Performed by: INTERNAL MEDICINE

## 2020-08-12 PROCEDURE — 83036 HEMOGLOBIN GLYCOSYLATED A1C: CPT | Performed by: INTERNAL MEDICINE

## 2020-08-12 PROCEDURE — 63710000001 INSULIN LISPRO (HUMAN) PER 5 UNITS: Performed by: ORTHOPAEDIC SURGERY

## 2020-08-12 PROCEDURE — 27236 TREAT THIGH FRACTURE: CPT | Performed by: SPECIALIST/TECHNOLOGIST, OTHER

## 2020-08-12 PROCEDURE — C1713 ANCHOR/SCREW BN/BN,TIS/BN: HCPCS | Performed by: ORTHOPAEDIC SURGERY

## 2020-08-12 PROCEDURE — 80048 BASIC METABOLIC PNL TOTAL CA: CPT | Performed by: INTERNAL MEDICINE

## 2020-08-12 PROCEDURE — C1776 JOINT DEVICE (IMPLANTABLE): HCPCS | Performed by: ORTHOPAEDIC SURGERY

## 2020-08-12 PROCEDURE — 25010000002 VANCOMYCIN PER 500 MG: Performed by: ORTHOPAEDIC SURGERY

## 2020-08-12 PROCEDURE — 25010000002 FENTANYL CITRATE (PF) 100 MCG/2ML SOLUTION: Performed by: NURSE ANESTHETIST, CERTIFIED REGISTERED

## 2020-08-12 PROCEDURE — 87635 SARS-COV-2 COVID-19 AMP PRB: CPT | Performed by: NURSE PRACTITIONER

## 2020-08-12 PROCEDURE — 63710000001 INSULIN LISPRO (HUMAN) PER 5 UNITS: Performed by: NURSE PRACTITIONER

## 2020-08-12 PROCEDURE — 25010000002 ONDANSETRON PER 1 MG: Performed by: NURSE ANESTHETIST, CERTIFIED REGISTERED

## 2020-08-12 PROCEDURE — 25010000002 PROPOFOL 10 MG/ML EMULSION: Performed by: NURSE ANESTHETIST, CERTIFIED REGISTERED

## 2020-08-12 PROCEDURE — 73502 X-RAY EXAM HIP UNI 2-3 VIEWS: CPT

## 2020-08-12 PROCEDURE — 25010000002 MORPHINE PER 10 MG: Performed by: INTERNAL MEDICINE

## 2020-08-12 PROCEDURE — 84443 ASSAY THYROID STIM HORMONE: CPT | Performed by: INTERNAL MEDICINE

## 2020-08-12 PROCEDURE — 25010000002 CEFTRIAXONE PER 250 MG: Performed by: ORTHOPAEDIC SURGERY

## 2020-08-12 PROCEDURE — 87040 BLOOD CULTURE FOR BACTERIA: CPT | Performed by: NURSE PRACTITIONER

## 2020-08-12 DEVICE — SMARTSET HIGH PERFORMANCE MV MEDIUM VISCOSITY BONE CEMENT 40G
Type: IMPLANTABLE DEVICE | Site: HIP | Status: FUNCTIONAL
Brand: SMARTSET

## 2020-08-12 DEVICE — DEV CONTRL TISS STRATAFIX SPIRAL PDO BIDIR MO4 36X36CM: Type: IMPLANTABLE DEVICE | Site: HIP | Status: FUNCTIONAL

## 2020-08-12 DEVICE — IMPLANTABLE DEVICE: Type: IMPLANTABLE DEVICE | Site: HIP | Status: FUNCTIONAL

## 2020-08-12 DEVICE — BONE PREPARATION KIT
Type: IMPLANTABLE DEVICE | Site: HIP | Status: FUNCTIONAL
Brand: BIOPREP

## 2020-08-12 DEVICE — TANDEM BIPOLAR COBALT CHROME 44MM                                    OUTER DIAMETER 28MM INNER DIAMETER
Type: IMPLANTABLE DEVICE | Site: HIP | Status: FUNCTIONAL
Brand: TANDEM

## 2020-08-12 DEVICE — INVIS DISTAL CENTRALIZER SIZE 8MM
Type: IMPLANTABLE DEVICE | Site: HIP | Status: FUNCTIONAL
Brand: INVIS

## 2020-08-12 DEVICE — SYNERGY CEMENTED FEMORAL COMPONENT                                    SZ 10
Type: IMPLANTABLE DEVICE | Site: HIP | Status: FUNCTIONAL
Brand: SYNERGY

## 2020-08-12 DEVICE — COBALT CHROME 12/14 TAPER FEMORAL                                    HEAD 28MM + 0: Type: IMPLANTABLE DEVICE | Site: HIP | Status: FUNCTIONAL

## 2020-08-12 RX ORDER — VANCOMYCIN HYDROCHLORIDE 1 G/200ML
15 INJECTION, SOLUTION INTRAVENOUS ONCE
Status: COMPLETED | OUTPATIENT
Start: 2020-08-12 | End: 2020-08-12

## 2020-08-12 RX ORDER — FAMOTIDINE 20 MG/1
20 TABLET, FILM COATED ORAL
Status: COMPLETED | OUTPATIENT
Start: 2020-08-12 | End: 2020-08-12

## 2020-08-12 RX ORDER — SODIUM CHLORIDE 9 MG/ML
150 INJECTION, SOLUTION INTRAVENOUS CONTINUOUS
Status: DISCONTINUED | OUTPATIENT
Start: 2020-08-12 | End: 2020-08-13

## 2020-08-12 RX ORDER — MEPERIDINE HYDROCHLORIDE 25 MG/ML
12.5 INJECTION INTRAMUSCULAR; INTRAVENOUS; SUBCUTANEOUS
Status: DISCONTINUED | OUTPATIENT
Start: 2020-08-12 | End: 2020-08-12 | Stop reason: HOSPADM

## 2020-08-12 RX ORDER — HYDROCODONE BITARTRATE AND ACETAMINOPHEN 5; 325 MG/1; MG/1
1 TABLET ORAL EVERY 4 HOURS PRN
Status: DISCONTINUED | OUTPATIENT
Start: 2020-08-12 | End: 2020-08-19 | Stop reason: HOSPADM

## 2020-08-12 RX ORDER — FENTANYL CITRATE 50 UG/ML
INJECTION, SOLUTION INTRAMUSCULAR; INTRAVENOUS AS NEEDED
Status: DISCONTINUED | OUTPATIENT
Start: 2020-08-12 | End: 2020-08-12 | Stop reason: SURG

## 2020-08-12 RX ORDER — SODIUM CHLORIDE 0.9 % (FLUSH) 0.9 %
10 SYRINGE (ML) INJECTION AS NEEDED
Status: DISCONTINUED | OUTPATIENT
Start: 2020-08-12 | End: 2020-08-12 | Stop reason: HOSPADM

## 2020-08-12 RX ORDER — PROMETHAZINE HYDROCHLORIDE 25 MG/ML
6.25 INJECTION, SOLUTION INTRAMUSCULAR; INTRAVENOUS ONCE AS NEEDED
Status: DISCONTINUED | OUTPATIENT
Start: 2020-08-12 | End: 2020-08-12 | Stop reason: HOSPADM

## 2020-08-12 RX ORDER — ASPIRIN 325 MG
325 TABLET ORAL DAILY
Status: DISCONTINUED | OUTPATIENT
Start: 2020-08-13 | End: 2020-08-13

## 2020-08-12 RX ORDER — DEXAMETHASONE SODIUM PHOSPHATE 4 MG/ML
INJECTION, SOLUTION INTRA-ARTICULAR; INTRALESIONAL; INTRAMUSCULAR; INTRAVENOUS; SOFT TISSUE AS NEEDED
Status: DISCONTINUED | OUTPATIENT
Start: 2020-08-12 | End: 2020-08-12 | Stop reason: SURG

## 2020-08-12 RX ORDER — LIDOCAINE HYDROCHLORIDE 10 MG/ML
INJECTION, SOLUTION EPIDURAL; INFILTRATION; INTRACAUDAL; PERINEURAL AS NEEDED
Status: DISCONTINUED | OUTPATIENT
Start: 2020-08-12 | End: 2020-08-12 | Stop reason: SURG

## 2020-08-12 RX ORDER — SODIUM CHLORIDE 0.9 % (FLUSH) 0.9 %
10 SYRINGE (ML) INJECTION EVERY 12 HOURS SCHEDULED
Status: DISCONTINUED | OUTPATIENT
Start: 2020-08-12 | End: 2020-08-12 | Stop reason: HOSPADM

## 2020-08-12 RX ORDER — ATENOLOL 50 MG/1
50 TABLET ORAL
Status: DISCONTINUED | OUTPATIENT
Start: 2020-08-12 | End: 2020-08-19 | Stop reason: HOSPADM

## 2020-08-12 RX ORDER — ONDANSETRON 2 MG/ML
INJECTION INTRAMUSCULAR; INTRAVENOUS AS NEEDED
Status: DISCONTINUED | OUTPATIENT
Start: 2020-08-12 | End: 2020-08-12 | Stop reason: SURG

## 2020-08-12 RX ORDER — FENTANYL CITRATE 50 UG/ML
50 INJECTION, SOLUTION INTRAMUSCULAR; INTRAVENOUS
Status: DISCONTINUED | OUTPATIENT
Start: 2020-08-12 | End: 2020-08-12 | Stop reason: HOSPADM

## 2020-08-12 RX ORDER — ROCURONIUM BROMIDE 10 MG/ML
INJECTION, SOLUTION INTRAVENOUS AS NEEDED
Status: DISCONTINUED | OUTPATIENT
Start: 2020-08-12 | End: 2020-08-12 | Stop reason: SURG

## 2020-08-12 RX ORDER — PROMETHAZINE HYDROCHLORIDE 25 MG/1
25 SUPPOSITORY RECTAL ONCE AS NEEDED
Status: DISCONTINUED | OUTPATIENT
Start: 2020-08-12 | End: 2020-08-12 | Stop reason: HOSPADM

## 2020-08-12 RX ORDER — VANCOMYCIN HYDROCHLORIDE 1 G/200ML
15 INJECTION, SOLUTION INTRAVENOUS ONCE
Status: CANCELLED | OUTPATIENT
Start: 2020-08-12

## 2020-08-12 RX ORDER — MAGNESIUM HYDROXIDE 1200 MG/15ML
LIQUID ORAL AS NEEDED
Status: DISCONTINUED | OUTPATIENT
Start: 2020-08-12 | End: 2020-08-12 | Stop reason: HOSPADM

## 2020-08-12 RX ORDER — PROMETHAZINE HYDROCHLORIDE 12.5 MG/1
12.5 TABLET ORAL EVERY 6 HOURS PRN
Status: DISCONTINUED | OUTPATIENT
Start: 2020-08-12 | End: 2020-08-19 | Stop reason: HOSPADM

## 2020-08-12 RX ORDER — HYDROMORPHONE HYDROCHLORIDE 1 MG/ML
0.5 INJECTION, SOLUTION INTRAMUSCULAR; INTRAVENOUS; SUBCUTANEOUS
Status: DISCONTINUED | OUTPATIENT
Start: 2020-08-12 | End: 2020-08-12 | Stop reason: HOSPADM

## 2020-08-12 RX ORDER — SODIUM CHLORIDE, SODIUM LACTATE, POTASSIUM CHLORIDE, CALCIUM CHLORIDE 600; 310; 30; 20 MG/100ML; MG/100ML; MG/100ML; MG/100ML
9 INJECTION, SOLUTION INTRAVENOUS CONTINUOUS PRN
Status: DISCONTINUED | OUTPATIENT
Start: 2020-08-12 | End: 2020-08-19 | Stop reason: HOSPADM

## 2020-08-12 RX ORDER — LIDOCAINE HYDROCHLORIDE 10 MG/ML
0.5 INJECTION, SOLUTION EPIDURAL; INFILTRATION; INTRACAUDAL; PERINEURAL ONCE AS NEEDED
Status: DISCONTINUED | OUTPATIENT
Start: 2020-08-12 | End: 2020-08-12 | Stop reason: HOSPADM

## 2020-08-12 RX ORDER — LABETALOL HYDROCHLORIDE 5 MG/ML
INJECTION, SOLUTION INTRAVENOUS AS NEEDED
Status: DISCONTINUED | OUTPATIENT
Start: 2020-08-12 | End: 2020-08-12 | Stop reason: SURG

## 2020-08-12 RX ORDER — FAMOTIDINE 10 MG/ML
20 INJECTION, SOLUTION INTRAVENOUS
Status: COMPLETED | OUTPATIENT
Start: 2020-08-12 | End: 2020-08-12

## 2020-08-12 RX ORDER — PROPOFOL 10 MG/ML
VIAL (ML) INTRAVENOUS AS NEEDED
Status: DISCONTINUED | OUTPATIENT
Start: 2020-08-12 | End: 2020-08-12 | Stop reason: SURG

## 2020-08-12 RX ORDER — PROMETHAZINE HYDROCHLORIDE 25 MG/1
25 TABLET ORAL ONCE AS NEEDED
Status: DISCONTINUED | OUTPATIENT
Start: 2020-08-12 | End: 2020-08-12 | Stop reason: HOSPADM

## 2020-08-12 RX ADMIN — MORPHINE SULFATE 2 MG: 2 INJECTION, SOLUTION INTRAMUSCULAR; INTRAVENOUS at 10:47

## 2020-08-12 RX ADMIN — INSULIN LISPRO 3 UNITS: 100 INJECTION, SOLUTION INTRAVENOUS; SUBCUTANEOUS at 21:12

## 2020-08-12 RX ADMIN — ONDANSETRON 4 MG: 2 INJECTION INTRAMUSCULAR; INTRAVENOUS at 17:23

## 2020-08-12 RX ADMIN — SODIUM CHLORIDE, POTASSIUM CHLORIDE, SODIUM LACTATE AND CALCIUM CHLORIDE 9 ML/HR: 600; 310; 30; 20 INJECTION, SOLUTION INTRAVENOUS at 15:10

## 2020-08-12 RX ADMIN — SODIUM CHLORIDE, PRESERVATIVE FREE 10 ML: 5 INJECTION INTRAVENOUS at 08:58

## 2020-08-12 RX ADMIN — LIDOCAINE HYDROCHLORIDE 20 MG: 10 INJECTION, SOLUTION EPIDURAL; INFILTRATION; INTRACAUDAL; PERINEURAL at 16:04

## 2020-08-12 RX ADMIN — VANCOMYCIN HYDROCHLORIDE 1 G: 1 INJECTION, SOLUTION INTRAVENOUS at 16:14

## 2020-08-12 RX ADMIN — ATENOLOL 50 MG: 50 TABLET ORAL at 12:38

## 2020-08-12 RX ADMIN — DEXAMETHASONE SODIUM PHOSPHATE 4 MG: 4 INJECTION, SOLUTION INTRAMUSCULAR; INTRAVENOUS at 16:21

## 2020-08-12 RX ADMIN — CEFTRIAXONE SODIUM 1 G: 1 INJECTION, POWDER, FOR SOLUTION INTRAMUSCULAR; INTRAVENOUS at 01:11

## 2020-08-12 RX ADMIN — FAMOTIDINE 20 MG: 10 INJECTION INTRAVENOUS at 14:59

## 2020-08-12 RX ADMIN — FENTANYL CITRATE 25 MCG: 50 INJECTION, SOLUTION INTRAMUSCULAR; INTRAVENOUS at 16:04

## 2020-08-12 RX ADMIN — CEFTRIAXONE SODIUM 1 G: 1 INJECTION, POWDER, FOR SOLUTION INTRAMUSCULAR; INTRAVENOUS at 21:13

## 2020-08-12 RX ADMIN — SODIUM CHLORIDE, PRESERVATIVE FREE 10 ML: 5 INJECTION INTRAVENOUS at 01:14

## 2020-08-12 RX ADMIN — SODIUM CHLORIDE 75 ML/HR: 9 INJECTION, SOLUTION INTRAVENOUS at 01:11

## 2020-08-12 RX ADMIN — ROCURONIUM BROMIDE 20 MG: 10 SOLUTION INTRAVENOUS at 16:04

## 2020-08-12 RX ADMIN — TRANEXAMIC ACID 1000 MG: 100 INJECTION, SOLUTION INTRAVENOUS at 17:19

## 2020-08-12 RX ADMIN — INSULIN LISPRO 2 UNITS: 100 INJECTION, SOLUTION INTRAVENOUS; SUBCUTANEOUS at 08:57

## 2020-08-12 RX ADMIN — INSULIN LISPRO 2 UNITS: 100 INJECTION, SOLUTION INTRAVENOUS; SUBCUTANEOUS at 12:39

## 2020-08-12 RX ADMIN — MORPHINE SULFATE 2 MG: 2 INJECTION, SOLUTION INTRAMUSCULAR; INTRAVENOUS at 00:38

## 2020-08-12 RX ADMIN — LABETALOL 20 MG/4 ML (5 MG/ML) INTRAVENOUS SYRINGE 10 MG: at 16:50

## 2020-08-12 RX ADMIN — INSULIN LISPRO 2 UNITS: 100 INJECTION, SOLUTION INTRAVENOUS; SUBCUTANEOUS at 01:11

## 2020-08-12 RX ADMIN — TRANEXAMIC ACID 1000 MG: 100 INJECTION, SOLUTION INTRAVENOUS at 16:09

## 2020-08-12 RX ADMIN — FENTANYL CITRATE 25 MCG: 50 INJECTION, SOLUTION INTRAMUSCULAR; INTRAVENOUS at 16:22

## 2020-08-12 RX ADMIN — FENTANYL CITRATE 50 MCG: 50 INJECTION, SOLUTION INTRAMUSCULAR; INTRAVENOUS at 17:35

## 2020-08-12 RX ADMIN — PROPOFOL 80 MG: 10 INJECTION, EMULSION INTRAVENOUS at 16:04

## 2020-08-12 RX ADMIN — SODIUM CHLORIDE 150 ML/HR: 9 INJECTION, SOLUTION INTRAVENOUS at 21:12

## 2020-08-12 NOTE — ANESTHESIA PREPROCEDURE EVALUATION
Anesthesia Evaluation     Patient summary reviewed and Nursing notes reviewed   NPO Solid Status: > 8 hours  NPO Liquid Status: > 8 hours           Airway   Mallampati: III  TM distance: <3 FB  Neck ROM: limited  Possible difficult intubation  Dental - normal exam     Pulmonary - normal exam   Cardiovascular   Exercise tolerance: poor (<4 METS)    Rhythm: regular  Rate: normal        Neuro/Psych  GI/Hepatic/Renal/Endo      Musculoskeletal     Abdominal    Substance History      OB/GYN          Other        ROS/Med Hx Other: Very poor historian                  Anesthesia Plan    ASA 3     general     intravenous induction     Anesthetic plan, all risks, benefits, and alternatives have been provided, discussed and informed consent has been obtained with: patient.

## 2020-08-12 NOTE — CONSULTS
"Inpatient Orthopedic Surgery Consult  Consult performed by: Ramirez Lopez MD  Consult ordered by: Caridad Beth MD  Reason for consult: Right hip fracture  Assessment/Recommendations: Displaced right hip femoral neck fracture which will require hemiarthroplasty for optimal treatment.  Last dose of Xarelto was 3 days ago.  She should be ready for surgical intervention this afternoon.  Risks and benefits of surgery have been discussed with daughter who wishes to proceed as planned.          Patient Care Team:  Sravan Franco APRN as PCP - General (Family Medicine)    Chief complaint: Right hip pain after fall    Subjective     Right hip gave way 2 days ago and she fell at home.  She lives independently with her son.  She was able to call her daughter who came to help.  She was brought to local facility where x-rays identified displaced femoral neck fracture.  Plans were made for transfer to our facility but they cannot be completed until over 24 hours later.  With her Xarelto dosing 3 days ago surgical intervention would not be available until today whether she was at her outside facility or here.      Review of Systems   Constitutional: Negative for activity change, appetite change and fatigue.   HENT: Negative.    Respiratory: Negative.    Cardiovascular: Negative.    Musculoskeletal: Negative.    Skin: Negative.    Neurological: Negative.    Psychiatric/Behavioral: Negative.         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   • T2DM (type 2 diabetes mellitus) (CMS/HCC)    • TIA (transient ischemic attack) 3/17/2017   • Weakness 6/6/2017   ,   Past Surgical History:   Procedure Laterality Date   • CHOLECYSTECTOMY     • " ENDOSCOPY  04/06/2015    showed 2 ulcerated lesions in the gastric antrum   • HYSTERECTOMY     • KNEE SURGERY Right    • OOPHORECTOMY     ,   Family History   Problem Relation Age of Onset   • Breast cancer Sister    • Breast cancer Maternal Aunt    • No Known Problems Mother    ,   Social History     Tobacco Use   • Smoking status: Never Smoker   • Smokeless tobacco: Never Used   Substance Use Topics   • Alcohol use: Never     Frequency: Never   • Drug use: Never    and Allergies:  Amoxicillin; Crestor [rosuvastatin]; Eliquis [apixaban]; Keflex [cephalexin]; Motrin [ibuprofen]; Tramadol; and Vasotec [enalapril maleate]    Objective      Vital Signs  Temp:  [97.5 °F (36.4 °C)-98 °F (36.7 °C)] 98 °F (36.7 °C)  Heart Rate:  [82-92] 82  Resp:  [14-16] 16  BP: (141-161)/(68-87) 150/73    Physical Exam   Constitutional: She appears well-developed and well-nourished. No distress.   HENT:   Head: Atraumatic.   Neck: Normal range of motion. Neck supple.   Cardiovascular: Normal rate and regular rhythm.   Pulmonary/Chest: Effort normal and breath sounds normal.   Abdominal: Soft.   Musculoskeletal:        Right hip: She exhibits decreased range of motion, decreased strength, tenderness and deformity. She exhibits no laceration.        Left hip: Normal.   Neurological: She is alert.   Skin: Skin is warm and dry. She is not diaphoretic.       Results Review:    I reviewed the patient's new clinical results.  I reviewed the patient's new imaging results and agree with the interpretation.        Assessment/Plan       Hip fracture (CMS/HCC)    Iron deficiency anemia    Hypertension    Atrial fibrillation (CMS/HCC)    Hyperlipidemia    CAD (coronary artery disease)    Acute UTI (urinary tract infection)    Altered mental status    Fall      Assessment & Plan  85-year-old who lives with her son but is independent for day-to-day household activities who fell 2 days ago injuring her right hip.  X-rays show displaced femoral neck  fracture.  She was transferred here yesterday and is now medically able to be taken for right hip hemiarthroplasty partial replacement surgery after Xarelto last dose 3 days ago.  Risks benefits indications and rationale for surgical intervention have been reviewed with patient and family.  Expectations for early rehab are also reviewed.  We are planning surgery this afternoon.    I discussed the patients findings and my recommendations with patient, family and primary care team    Ramirez Lopez MD  08/12/20  12:59

## 2020-08-12 NOTE — PLAN OF CARE
Problem: Patient Care Overview  Goal: Plan of Care Review  Outcome: Ongoing (interventions implemented as appropriate)  Flowsheets (Taken 8/12/2020 4542)  Progress: no change  Plan of Care Reviewed With: patient  Outcome Summary: received report from ED RN. pt AAOriented only to self, VSS and in no apparent distress or discomfort . Pt IS NPO.  poor historian. will let next shift know so when daughter comes can continue pt hx.  wt stii needs to be obtained. pt has rested/slept comfortably for 3 hours.  will continue to monitor

## 2020-08-12 NOTE — ANESTHESIA PROCEDURE NOTES
Airway  Urgency: elective    Date/Time: 8/12/2020 4:06 PM  Airway not difficult    General Information and Staff    Patient location during procedure: OR  CRNA: Nemesio Romero CRNA    Indications and Patient Condition  Indications for airway management: airway protection    Preoxygenated: yes  MILS not maintained throughout  Mask difficulty assessment: 1 - vent by mask    Final Airway Details  Final airway type: endotracheal airway      Successful airway: ETT  Cuffed: yes   Successful intubation technique: direct laryngoscopy  Endotracheal tube insertion site: oral  Blade: John  Blade size: 3  ETT size (mm): 7.0  Cormack-Lehane Classification: grade I - full view of glottis  Placement verified by: chest auscultation and capnometry   Measured from: lips  ETT/EBT  to lips (cm): 20  Number of attempts at approach: 1  Assessment: lips, teeth, and gum same as pre-op and atraumatic intubation    Additional Comments  Negative epigastric sounds, Breath sound equal bilaterally with symmetric chest rise and fall

## 2020-08-12 NOTE — H&P
"    [facility] Medicine Services  HISTORY AND PHYSICAL    Patient Name: Analilia De Leon  : 1935  MRN: [id number]  Primary Care Physician: Sravan Franco APRN  Date of admission: 2020      Subjective   Subjective     Chief Complaint:  Hip fracture     HPI:  Analilia De Leon is a 85 y.o. female w/ a hx of CAD, HTN, T2DM, HLD, peripheral neuropathy and PAF (on Xarelto) who was transferred from the OSH for further evaluation/tx of acute hip fracture.  Pt presented to the OSH ED ([facility]) w/ c/o a fall on 8/10/2020. Pt lives w/ her son who was not at home @ time of fall. Fall unwitnessed. Pt reported that she stood up and \"lost her balance\" per OSH records. XRAYS c/w acute right femoral neck fracture. Urinalysis c/w acute UTI. Pt given IV Rocephin and pain medication for the fracture.  Per family, pt w/ no recent illness or hospitalizations. No recent fever/chills, chest pain, dyspnea, cough, N/V/D, abdominal pain, dysuria. Pt w/ confusion @ time of exam; HPI/ROS per her son @ bedside.    On routine Xarelto; last dose .  Pt transferred to [facility] and admitted to the hospital medicine service for further evaluation.       Review of Systems   Unable to perform ROS: Mental status change        Personal History     Past Medical History:   Diagnosis Date   • Ataxia 2017   • Atrial fibrillation (CMS/HCC)    • BPPV (benign paroxysmal positional vertigo) 2017   • Chronic anticoagulation    • Facial numbness     takes Keppra for \"facial numbness\"    • History of blood transfusion    • Hypertension    • Microangiopathy (CMS/HCC) 2017   • T2DM (type 2 diabetes mellitus) (CMS/HCC)    • TIA (transient ischemic attack) 3/17/2017   • Weakness 2017       Past Surgical History:   Procedure Laterality Date   • CHOLECYSTECTOMY     • ENDOSCOPY  2015    showed 2 ulcerated lesions in the gastric antrum   • HYSTERECTOMY     • KNEE SURGERY Right    • OOPHORECTOMY   "       Family History: family history includes Breast cancer in her maternal aunt and sister; No Known Problems in her mother. Otherwise pertinent FHx was reviewed and unremarkable.     Social History:  reports that she has never smoked. She has never used smokeless tobacco. She reports that she does not drink alcohol or use drugs.  Social History     Social History Narrative    Lives w/ her son.        Medications:  Available home medication information reviewed.  Medications Prior to Admission   Medication Sig Dispense Refill Last Dose   • atenolol (TENORMIN) 50 MG tablet   6 Taking   • ferrous sulfate 325 (65 FE) MG tablet   3 Taking   • fluconazole (DIFLUCAN) 200 MG tablet    Taking   • gabapentin (NEURONTIN) 300 MG capsule    Taking   • losartan-hydrochlorothiazide (HYZAAR) 50-12.5 MG per tablet   6 Taking   • lovastatin (MEVACOR) 10 MG tablet    Taking   • metFORMIN (GLUCOPHAGE) 500 MG tablet    Taking   • nitrofurantoin (MACRODANTIN) 100 MG capsule    Taking   • XARELTO 10 MG tablet   6 Taking       Allergies   Allergen Reactions   • Amoxicillin    • Crestor [Rosuvastatin]    • Eliquis [Apixaban]    • Keflex [Cephalexin]    • Motrin [Ibuprofen]    • Tramadol Unknown - Low Severity   • Vasotec [Enalapril Maleate]        Objective   Objective     Vital Signs:   Temp:  [97.6 °F (36.4 °C)] 97.6 °F (36.4 °C)  Heart Rate:  [92] 92  BP: (151)/(87) 151/87        Physical Exam   Constitutional: She appears well-developed and well-nourished. No distress.   HENT:   Head: Normocephalic and atraumatic.   Eyes: Pupils are equal, round, and reactive to light. EOM are normal.   Neck: Normal range of motion. Neck supple.   Cardiovascular: Regular rhythm, normal heart sounds and intact distal pulses. Tachycardia present. Exam reveals no gallop and no friction rub.   No murmur heard.  Pulmonary/Chest: Effort normal and breath sounds normal. No stridor. No respiratory distress. She has no wheezes. She has no rales. She exhibits  no tenderness.   Abdominal: Soft. Bowel sounds are normal. She exhibits no distension. There is no tenderness.   Musculoskeletal: She exhibits no edema.   RLE shortened and externally rotated 2/2 acute right hip fracture. ROM otherwise WNL.    Neurological: She is alert.   Pt is alert but non-oriented. Pt alert to name,  and son who is at bedside. Confused in regards to place, year. No focal deficits noted.    Skin: Skin is warm and dry. Capillary refill takes more than 3 seconds. No rash noted. She is not diaphoretic. No erythema. No pallor.   Psychiatric:   Pt w/ confusion @ time of exam.    Nursing note and vitals reviewed.       Results Reviewed:  I have personally reviewed current lab and radiology data.    Results from last 7 days   Lab Units 20   WBC 10*3/mm3 14.58*   HEMOGLOBIN g/dL 9.3*   HEMATOCRIT % 32.0*   PLATELETS 10*3/mm3 225           Invalid input(s):  ALKPHOS  CrCl cannot be calculated (Patient's most recent lab result is older than the maximum 30 days allowed.).  Brief Urine Lab Results     None        Imaging Results (Last 24 Hours)     Procedure Component Value Units Date/Time    XR Chest 1 View [[id number]] Resulted:  20     Updated:  20    XR Outside Films [[id number]] Resulted:  20     Updated:  20    Narrative:       This procedure was auto-finalized with no dictation required.             Assessment/Plan   Assessment & Plan     Active Hospital Problems    Diagnosis POA   • **Hip fracture (CMS/HCC) [S72.009A] Yes   • Acute UTI (urinary tract infection) [N39.0] Yes   • Altered mental status [R41.82] Yes   • Fall [W19.XXXA] Unknown   • Atrial fibrillation (CMS/HCC) [I48.91] Yes   • Hyperlipidemia [E78.5] Yes   • Hypertension [I10] Yes   • CAD (coronary artery disease) [I25.10] Yes   • Iron deficiency anemia [D50.9] Yes              Right Hip XRAY: Acute or recent fracture of the right femoral neck with some displacement and rotation.      "    Assessment & Plan    **Right hip fracture 2/2 mechanical fall  -per OSH xrays; results above  -Dr. Lopez w/ Ortho plans to take to OR for repair in am; consult placed   -NPO after midnight   -baseline labs pending; cbc, mag, cmp, coags  -pre-op EKG, type/screen and CXR pending  -light IVF overnight; NS @ 75ml/hour x 1 liter  -symptom mgt; PRN Morphine pain control  -case mgt consult   -CT head pending as fall unwitnessed; pt w/ mild confusion (thought to be 2/2 IV Narcotics and/or UTI; oriented to name,  and son)   -COVID-19 pre-procedure swab ordered    **Altered mental status  -per family pt became \"mildly\" confused after IV Narcotics given; oriented x4 @ baseline per her son   -CT head pending  -neuro checks overnight   -baseline labs pending  -fall precautions/bed alarm     **Acute UTI  -per OSH UA (+nitrates, 2+ bacteria, WBC 30, RBC 25)  -started on IV Rocephin @ OSH; continue pending culture results  -repeat blood and UA/urine cultures  -lactic and procal pending  -light IVF overnight     **T2DM  -hem a1c w/ ma labs  -FSBG q ac/hs w/ LDSS  -hold routine metformin     **PAF  -CHADSVASC 5  -on routine Xarelto; held for surgery (last dose )   -mild tachy @ time of exam- rate 110-120's; on routine Atenolol (awaiting med rec verification); give IV Lopressor now and monitor     **HTN/HLD  -IV Lopressor now for BP and HR control  -awaiting med rec verification; will restart appropriate meds PRN       DVT prophylaxis:  Mechanical       CODE STATUS:  full  Code Status and Medical Interventions:   Ordered at: 20     Code Status:    CPR     Medical Interventions (Level of Support Prior to Arrest):    Full       Admission Status:  I believe this patient meets INPATIENT status due to hip fracture with planned surgical repair, close monitoring  I feel patient’s risk for adverse outcomes and need for care warrant INPATIENT evaluation and I predict the patient’s care encounter to likely last beyond 2 " midnights.      Electronically signed by KEDAR Griffith, 08/11/20, 8:14 PM.      Attending   Admission Attestation       I have seen and examined the patient, performing an independent face-to-face diagnostic evaluation with plan of care reviewed and developed with the advanced practice clinician (APC).      Brief Summary Statement:   Analilia De Leon is a 85 y.o. female with h/o CAD, HTN, T2DM, HLD as well as pAF on xarelto who presents as a transfer from OSH due to acute right sided hip fracture.     Patient noted to have fall on 8/10. This was unwitnessed but per son this happened at home when he was not present. OSH reports this fall was mechanical and patient lost her balance. Imaging notable at OSH for right femoral neck fx. Course thus far at OSH also complicated by UTI- patient treated with rocephin at OSH.    Of note, patient is also on xarelto, last dose Sunday per OSH/patient report.   Case was d/w [facility] by OSH , specifically Dr Lopez who would plan for operative repair Wed 8/12.  Remainder of detailed HPI is as noted by APC and has been reviewed and/or edited by me for completeness.    Attending Physical Exam:  GEN- in mild distress from pain upon nursing transfer, teeth chattering  HEENT- atraumatic, normocephlic, eomi, on NC  NECK- supple, trachea midline, no masses  RESP: ctab, normal effort, on NC  CV: no murmurs, s1/s2, rrr  MSK: no edema noted, right hip bruised, externally rotated   NEURO: alert, not oriented  SKIN: no rashes  PSYCH: appears in pain but mood is appropriate otherwise       Brief Assessment/Plan :  See detailed assessment and plan developed with APC which I have reviewed and/or edited for completeness.    Electronically signed by Caridad Beth MD, 08/11/20, 10:03 PM.

## 2020-08-12 NOTE — ANESTHESIA POSTPROCEDURE EVALUATION
Patient: Analilia De Leon    Procedure Summary     Date:  08/12/20 Room / Location:   [facility] /  [facility]    Anesthesia Start:  1556 Anesthesia Stop:  1804    Procedure:  HIP BI-POLAR (Right Hip) Diagnosis:      Surgeon:  Ramirez Lopez MD Provider:  Dre Duenas MD    Anesthesia Type:  general ASA Status:  3          Anesthesia Type: general    Vitals  Vitals Value Taken Time   /61 8/12/2020  6:00 PM   Temp     Pulse 81 8/12/2020  6:02 PM   Resp     SpO2 93 % 8/12/2020  6:03 PM   Vitals shown include unvalidated device data.        Post Anesthesia Care and Evaluation    Patient location during evaluation: PACU  Patient participation: complete - patient participated  Level of consciousness: awake and alert  Pain score: 0  Pain management: adequate  Airway patency: patent  Anesthetic complications: No anesthetic complications  PONV Status: none  Cardiovascular status: hemodynamically stable and acceptable  Respiratory status: nonlabored ventilation, acceptable and nasal cannula  Hydration status: acceptable

## 2020-08-12 NOTE — PROGRESS NOTES
Discharge Planning Assessment  [facility]     Patient Name: Analilia De Leon  MRN: [id number]  Today's Date: 8/12/2020    Admit Date: 8/11/2020    Discharge Needs Assessment     Row Name 08/12/20 1103       Living Environment    Lives With  child(michael), adult    Name(s) of Who Lives With Patient  Corby De Leon ( son)     Current Living Arrangements  home/apartment/condo    Primary Care Provided by  self    Provides Primary Care For  no one, unable/limited ability to care for self    Family Caregiver if Needed  child(michael), adult    Quality of Family Relationships  helpful;involved;supportive    Able to Return to Prior Arrangements  no    Living Arrangement Comments  will need inpt rehab       Resource/Environmental Concerns    Resource/Environmental Concerns  none    Transportation Concerns  car, none       Transition Planning    Patient/Family Anticipates Transition to  inpatient rehabilitation facility    Patient/Family Anticipated Services at Transition  skilled nursing;rehabilitation services    Transportation Anticipated  family or friend will provide       Discharge Needs Assessment    Readmission Within the Last 30 Days  no previous admission in last 30 days    Concerns to be Addressed  basic needs;discharge planning    Equipment Currently Used at Home  cane, straight;walker, rolling was not using walker prior to admit    Anticipated Changes Related to Illness  inability to care for self    Equipment Needed After Discharge  none    Outpatient/Agency/Support Group Needs  skilled nursing facility;inpatient rehabilitation facility    Discharge Facility/Level of Care Needs  rehabilitation facility;nursing facility, skilled    Current Discharge Risk  dependent with mobility/activities of daily living        Discharge Plan     Row Name 08/12/20 1105       Plan    Plan  Inpt rehab    Provided Post Acute Provider List?  N/A    N/A Provider List Comment  Daughter requested [facility]    Patient/Family in Agreement with Plan  yes     Plan Comments  I met with Ms De Leon's daughter at bedside (Jennifer) to initiate d/c planning. Ms De Leon lives with her son in [place]. Prior to this admit she was ambulatory with a cane, she able to do her own personal care.. We discussed rehab options.They would like a referral to [facility], (she has been there before after a TKR). I spoke with Terence at [facility] who accepted referral. Case mgt will follow        Destination      Coordination has not been started for this encounter.      Durable Medical Equipment      Coordination has not been started for this encounter.      Dialysis/Infusion      Coordination has not been started for this encounter.      Home Medical Care      Coordination has not been started for this encounter.      Therapy      Coordination has not been started for this encounter.      Community Resources      Coordination has not been started for this encounter.        Expected Discharge Date and Time     Expected Discharge Date Expected Discharge Time    Aug 17, 2020         Demographic Summary     Row Name 08/12/20 1102       Contact Information    Contact Information Comments  Corby De Leon ( son) [phone]    Row Name 08/12/20 1049       General Information    Admission Type  inpatient    Arrived From  home    Referral Source  physician    Reason for Consult  discharge planning    Preferred Language  English    General Information Comments  PCP- Sravan Franco       Contact Information    Permission Granted to Share Info With  ;family/designee        Functional Status     Row Name 08/12/20 1102       Functional Status    Usual Activity Tolerance  moderate    Current Activity Tolerance  -- await PT eval       Functional Status, IADL    Medications  independent    Meal Preparation  assistive person    Housekeeping  assistive person    Laundry  assistive person    Shopping  assistive person       Mental Status    General Appearance WDL  WDL       Mental Status Summary    Recent Changes in  Mental Status/Cognitive Functioning  no changes       Employment/    Employment Status  retired    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       Abuse/Neglect    No documentation.       Legal    No documentation.       Substance Abuse    No documentation.       Patient Forms    No documentation.           Sonja C Kellerman, RN

## 2020-08-12 NOTE — OP NOTE
HIP HEMIARTHROPLASTY  Progress Note    Analilia De Leon  8/12/2020    Pre-op Diagnosis:   Right hip displaced femoral neck fracture       Post-Op Diagnosis Codes:  Right hip displaced femoral neck fracture    Procedure/CPT® Codes:  56934  cemented hemiarthroplasty right hip      Procedure(s):  HIP BI-POLAR    Surgeon(s):  Ramirez Lopez MD     Assistant: Damari Hurd PA  was responsible for performing the following activities: Retraction, Suction, Irrigation, Suturing, Closing and Placing Dressing and their skilled assistance was necessary for the success of this case.    Anesthesia: General    Staff:   Circulator: Vee Shah RN  Scrub Person: Barbara Talley; Howard Gonzalez  Vendor Representative: John Webb  Nursing Assistant: Ary Dodge  Assistant: Damari Hurd PA  Assistant: Damari Hurd PA      Estimated Blood Loss: 300 mL    Urine Voided: * No values recorded between 8/12/2020  3:56 PM and 8/12/2020  5:39 PM *    Specimens:                None          Drains:   Urethral Catheter (Active)   Daily Indications Selected surgeries ( tract, abdomen) 8/12/2020  8:50 AM   Site Assessment Skin intact;Clean 8/12/2020  8:50 AM   Collection Container Standard drainage bag 8/12/2020  8:50 AM   Securement Method Securing device 8/12/2020  2:37 AM   Catheter care complete Yes 8/12/2020  2:37 AM   Output (mL) 150 mL 8/12/2020  5:40 AM       Findings: Expected    Complications: None    Implants:  [employer] cemented Prodigy stem size 10  +0/28 mm Oxinium head adapter with 28/44 mm bipolar head    Indications:  85-year-old woman who fell 2 days ago injuring her right hip.  Evaluation at her local facility identified displaced femoral neck fracture.  Her last dose of Xarelto was the day before injury.  Plans were made for transfer to our facility but this cannot be completed until yesterday evening.  Risks benefits indications and rationale for right hip replacement surgery as treatment for the displaced  femoral neck fracture are reviewed with patient and daughter.  Family signed consent after all questions are answered.  They wish to proceed today as planned.    Procedure:  While in the operating room general anesthesia begun with satisfactory level.  Transferred to the Levi table and prepped and draped in standard fashion with the right leg free in the right side up lateral decubitus position.  Landmarks identified and standard incision made centered over the greater trochanter.  Routine lateral approach to the proximal femur developed.  Direct lateral approach carried through the capsule including release of the gluteus minimus muscle which was repaired at closure.  Femoral neck fracture identified and cut made about 1 cm above the lesser trochanter.  Femoral head removed with only minor difficulties and there was easy assertion of the trial head after capsular release.  Trial head with the 44 mm had excellent suction fit.  Femoral canal was then confirmed and prepared with reaming and broaching to the size 10 Prodigy.  Trials at this point showed good recovery of limb lengths and excellent stability.  Traction stitch was required for a capsular leaf.  Trials removed and canal thoroughly irrigated.  Standard cement technique used for femoral component with excess cement removed during the curing process.  Final components were assembled and reduced again with full range of motion and good stability.  Capsule and minimus and gluteus medius were all repaired as independent layers.  Skin repaired with subcutaneous Vicryl.  Standard Barbara dressing applied.  Patient stable recovery having tolerated procedure well throughout with final counts correct.    Ramirez Lopez MD      Date: 8/12/2020  Time: 17:48

## 2020-08-12 NOTE — PLAN OF CARE
Pt vss; a fib on telemetry; aba in place; turned q2; scds worn; 2 liters oxygen worn; morphine IV given for pain prn; daughter at bedside. Down in surgery right now for repair of right hip. Pt is confused to situation/place/time.

## 2020-08-12 NOTE — PROGRESS NOTES
[facility] Medicine Services  PROGRESS NOTE    Patient Name: Analilia De Leon  : 1935  MRN: [id number]    Date of Admission: 2020  Primary Care Physician: Sravan Franco APRN    Subjective   Subjective     CC:  Right hip fracture    HPI:  Pt doing okay this am, but has some pain in her hip.  No issues overnight.     Review of Systems  Gen- No fevers, chills  CV- No chest pain, palpitations  Resp- No cough, dyspnea  GI- No N/V/D, abd pain    Objective   Objective     Vital Signs:   Temp:  [97.5 °F (36.4 °C)-98 °F (36.7 °C)] 97.9 °F (36.6 °C)  Heart Rate:  [82-92] 83  Resp:  [14-16] 16  BP: (141-161)/(68-87) 161/84        Physical Exam:  Constitutional: No acute distress, awake but keeps her eyes closed  HENT: NCAT, mucous membranes moist  Respiratory: Clear to auscultation bilaterally, respiratory effort normal   Cardiovascular: irregularly irregular with rates in the mid 80s, appears afib on tele,  no murmurs, rubs, or gallops, palpable pedal pulses bilaterally  Gastrointestinal: Positive bowel sounds, soft, nontender, nondistended  Musculoskeletal: No bilateral ankle edema, RLE shortened and externally rotated  Psychiatric: Appropriate affect, cooperative  Neurologic: Oriented x 3,  speech clear  Skin: No rashes    Results Reviewed:  Results from last 7 days   Lab Units 20  03420   WBC 10*3/mm3 12.15*  --  14.58*   HEMOGLOBIN g/dL 8.7*  --  9.3*   HEMATOCRIT % 30.3*  --  32.0*   PLATELETS 10*3/mm3 206  --  225   INR   --  1.28*  --    PROCALCITONIN ng/mL  --  0.07  --      Results from last 7 days   Lab Units 20  03420   SODIUM mmol/L 140 138   POTASSIUM mmol/L 5.0 5.6*   CHLORIDE mmol/L 106 106   CO2 mmol/L 24.0 21.0*   BUN mg/dL 18 13   CREATININE mg/dL 0.90 0.99   GLUCOSE mg/dL 150* 178*   CALCIUM mg/dL 8.9 9.2   ALT (SGPT) U/L  --  15   AST (SGOT) U/L  --  23   TROPONIN T ng/mL  --  <0.010     CrCl cannot be  calculated (Unknown ideal weight.).    Microbiology Results Abnormal     Procedure Component Value - Date/Time    COVID PRE-OP / PRE-PROCEDURE SCREENING ORDER (NO ISOLATION) - Swab, Nasopharynx [[id number]] Collected:  08/12/20 0038    Lab Status:  Final result Specimen:  Swab from Nasopharynx Updated:  08/12/20 0142    Narrative:       The following orders were created for panel order COVID PRE-OP / PRE-PROCEDURE SCREENING ORDER (NO ISOLATION) - Swab, Nasopharynx.  Procedure                               Abnormality         Status                     ---------                               -----------         ------                     COVID-19, ABBOTT IN-HOUS...[[id number]]  Normal              Final result                 Please view results for these tests on the individual orders.    COVID-19, ABBOTT IN-HOUSE,NP Swab (NO TRANSPORT MEDIA) 2 HR TAT - Swab, Nasopharynx [[id number]]  (Normal) Collected:  08/12/20 0038    Lab Status:  Final result Specimen:  Swab from Nasopharynx Updated:  08/12/20 0142     COVID19 Not Detected    Narrative:       Fact sheet for providers: https://www.fda.gov/media/087662/download     Fact sheet for patients: https://www.fda.gov/media/630354/download          Imaging Results (Last 24 Hours)     Procedure Component Value Units Date/Time    CT Head Without Contrast [[id number]] Collected:  08/11/20 2330     Updated:  08/11/20 2332    Narrative:       CT Head WO    HISTORY:   Ataxia after head injury 8/10/2020.    TECHNIQUE:   Axial unenhanced head CT with multiplanar reformats. Radiation dose reduction techniques included automated exposure control or exposure modulation based on body size. Count of known CT and cardiac nuc med studies performed in previous 12 months: 0.     COMPARISON:   4/9/2016     FINDINGS:   Ventricular size and configuration are normal. No acute infarct or hemorrhage is identified. There are no masses. There is no skull fracture.  There is atrophy with chronic small  vessel ischemic disease in the white matter. Atherosclerotic calcifications  are present in the carotid siphons and vertebral arteries. Visualized paranasal sinuses and mastoid air cells are clear.      Impression:       Senescent changes without acute abnormality.    Signer Name: Quentin Angela MD   Signed: 8/11/2020 11:30 PM   Workstation Name: [facility]    [facility]    XR Chest 1 View [[id number]] Collected:  08/11/20 2147     Updated:  08/11/20 2149    Narrative:       CR Chest 1 Vw    INDICATION:   85-year-old female presenting for preoperative evaluation. Baseline chest x-ray.     COMPARISON:    None available.    FINDINGS:  Single portable AP view(s) of the chest.    No visible support lines.    There is globular cardiomegaly. Aorta demonstrates atherosclerotic change. Low lung volumes without distinct volume overload. Coarsened interstitial markings bilaterally suggestive of combination of atelectasis and probable underlying fibrosis. Probable  retrocardiac atelectasis. No distinct effusion or pneumothorax.       Impression:       1. Globular cardiomegaly low lung volumes and probable atelectasis and chronic interstitial changes. No pneumothorax.    Signer Name: Drake Mortensen MD   Signed: 8/11/2020 9:47 PM   Workstation Name: [facility]    [facility]    XR Outside Films [[id number]] Resulted:  08/11/20 2035     Updated:  08/11/20 2035    Narrative:       This procedure was auto-finalized with no dictation required.               I have reviewed the medications:  Scheduled Meds:  cefTRIAXone 1 g Intravenous Q24H   insulin lispro 0-7 Units Subcutaneous 4x Daily With Meals & Nightly   metoprolol tartrate 2.5 mg Intravenous Once   sodium chloride 10 mL Intravenous Q12H     Continuous Infusions:   PRN Meds:.dextrose  •  dextrose  •  glucagon (human recombinant)  •  Morphine  •  Morphine  •  naloxone  •  sodium chloride    Assessment/Plan   Assessment & Plan      "    Active Hospital Problems    Diagnosis  POA   • **Hip fracture (CMS/[facility]) [S72.009A]  Yes   • Acute UTI (urinary tract infection) [N39.0]  Yes   • Altered mental status [R41.82]  Yes   • Fall [W19.XXXA]  Unknown   • Atrial fibrillation (CMS/[facility]) [I48.91]  Yes   • Hyperlipidemia [E78.5]  Yes   • Hypertension [I10]  Yes   • CAD (coronary artery disease) [I25.10]  Yes   • Iron deficiency anemia [D50.9]  Yes      Resolved Hospital Problems   No resolved problems to display.        Brief Hospital Course to date:  Analilia De Leon is a 85 y.o. female with h/o CAD, HTN, T2DM, HLD as well as pAF on xarelto who presented as a transfer from OSH due to acute right sided hip fracture.    Plan:    Right hip fracture due to mechanical fall  -Dr. Lopez w/ Ortho plans to take to OR today  -symptom mgt; PRN Morphine pain control  -case mgt consult   -CT head unremarkable  -COVID-19 pre-procedure swab NEGATIVE     Altered mental status  -per family pt became \"mildly\" confused after IV Narcotics given  -CT head unremarkable  --at baseline now     Acute UTI  -per OSH UA (+nitrites, 2+ bacteria, WBC 30)  -started on IV Rocephin @ OSH; obtain OSH UCx  -repeat blood and UA/urine cultures PENDING.  UCx here may be of little value due to antibiotic modification  -lactic and procal wnl     T2DM  -hem a1c 6.8%  -FSBG q ac/hs w/ LDSS  -hold routine metformin      PAF  -CHADSVASC 5  -on routine Xarelto; held for surgery (last dose Sunday, 8/9)   --continue rate controlling meds- on Atenolol at home per med rec     HTN/HLD  -IV Lopressor now for BP and HR control  --will resume home meds        DVT prophylaxis:  Mechanical          Disposition: I expect the patient to be discharged tbd    CODE STATUS:   Code Status and Medical Interventions:   Ordered at: 08/11/20 2122     Code Status:    CPR     Medical Interventions (Level of Support Prior to Arrest):    Full         Electronically signed by Becka Peres MD, 08/12/20, 08:34.    "

## 2020-08-13 ENCOUNTER — APPOINTMENT (OUTPATIENT)
Dept: GENERAL RADIOLOGY | Facility: HOSPITAL | Age: 85
End: 2020-08-13

## 2020-08-13 ENCOUNTER — APPOINTMENT (OUTPATIENT)
Dept: CT IMAGING | Facility: HOSPITAL | Age: 85
End: 2020-08-13

## 2020-08-13 ENCOUNTER — APPOINTMENT (OUTPATIENT)
Dept: CARDIOLOGY | Facility: HOSPITAL | Age: 85
End: 2020-08-13

## 2020-08-13 PROBLEM — J96.01 ACUTE RESPIRATORY FAILURE WITH HYPOXIA (HCC): Status: ACTIVE | Noted: 2020-08-13

## 2020-08-13 LAB
ANION GAP SERPL CALCULATED.3IONS-SCNC: 9 MMOL/L (ref 5–15)
APTT PPP: 32.4 SECONDS (ref 50–95)
ARTERIAL PATENCY WRIST A: ABNORMAL
ATMOSPHERIC PRESS: ABNORMAL MM[HG]
BACTERIA SPEC AEROBE CULT: NO GROWTH
BACTERIA UR QL AUTO: ABNORMAL /HPF
BASE EXCESS BLDA CALC-SCNC: -3.6 MMOL/L (ref 0–2)
BASOPHILS # BLD AUTO: 0.04 10*3/MM3 (ref 0–0.2)
BASOPHILS # BLD AUTO: 0.05 10*3/MM3 (ref 0–0.2)
BASOPHILS NFR BLD AUTO: 0.2 % (ref 0–1.5)
BASOPHILS NFR BLD AUTO: 0.2 % (ref 0–1.5)
BDY SITE: ABNORMAL
BH CV ECHO MEAS - BSA(HAYCOCK): 1.6 M^2
BH CV ECHO MEAS - BSA: 1.6 M^2
BH CV ECHO MEAS - BZI_BMI: 24.9 KILOGRAMS/M^2
BH CV ECHO MEAS - BZI_METRIC_HEIGHT: 154 CM
BH CV ECHO MEAS - BZI_METRIC_WEIGHT: 59 KG
BH CV ECHO MEAS - EDV(CUBED): 37.1 ML
BH CV ECHO MEAS - EDV(MOD-SP2): 63.3 ML
BH CV ECHO MEAS - EDV(MOD-SP4): 99 ML
BH CV ECHO MEAS - EDV(TEICH): 45.2 ML
BH CV ECHO MEAS - EF(CUBED): 50.2 %
BH CV ECHO MEAS - EF(MOD-SP2): 54.8 %
BH CV ECHO MEAS - EF(MOD-SP4): 37.5 %
BH CV ECHO MEAS - EF(TEICH): 43.4 %
BH CV ECHO MEAS - ESV(CUBED): 18.4 ML
BH CV ECHO MEAS - ESV(MOD-SP2): 28.6 ML
BH CV ECHO MEAS - ESV(MOD-SP4): 61.9 ML
BH CV ECHO MEAS - ESV(TEICH): 25.6 ML
BH CV ECHO MEAS - FS: 20.8 %
BH CV ECHO MEAS - IVS/LVPW: 1.1
BH CV ECHO MEAS - IVSD: 1 CM
BH CV ECHO MEAS - LV DIASTOLIC VOL/BSA (35-75): 63.2 ML/M^2
BH CV ECHO MEAS - LV MASS(C)D: 92.5 GRAMS
BH CV ECHO MEAS - LV MASS(C)DI: 59 GRAMS/M^2
BH CV ECHO MEAS - LV SYSTOLIC VOL/BSA (12-30): 39.5 ML/M^2
BH CV ECHO MEAS - LVIDD: 3.3 CM
BH CV ECHO MEAS - LVIDS: 2.6 CM
BH CV ECHO MEAS - LVLD AP2: 5.8 CM
BH CV ECHO MEAS - LVLD AP4: 6.9 CM
BH CV ECHO MEAS - LVLS AP2: 4.7 CM
BH CV ECHO MEAS - LVLS AP4: 6.4 CM
BH CV ECHO MEAS - LVPWD: 0.92 CM
BH CV ECHO MEAS - PA ACC TIME: 0.14 SEC
BH CV ECHO MEAS - PA PR(ACCEL): 17.2 MMHG
BH CV ECHO MEAS - RAP SYSTOLE: 8 MMHG
BH CV ECHO MEAS - RVSP: 83 MMHG
BH CV ECHO MEAS - SI(CUBED): 11.9 ML/M^2
BH CV ECHO MEAS - SI(MOD-SP2): 22.2 ML/M^2
BH CV ECHO MEAS - SI(MOD-SP4): 23.7 ML/M^2
BH CV ECHO MEAS - SI(TEICH): 12.5 ML/M^2
BH CV ECHO MEAS - SV(CUBED): 18.6 ML
BH CV ECHO MEAS - SV(MOD-SP2): 34.7 ML
BH CV ECHO MEAS - SV(MOD-SP4): 37.1 ML
BH CV ECHO MEAS - SV(TEICH): 19.6 ML
BH CV ECHO MEAS - TR MAX PG: 61 MMHG
BH CV ECHO MEAS - TR MAX VEL: 391.3 CM/SEC
BH CV VAS BP LEFT ARM: NORMAL MMHG
BILIRUB UR QL STRIP: NEGATIVE
BODY TEMPERATURE: 37 C
BUN SERPL-MCNC: 21 MG/DL (ref 8–23)
BUN/CREAT SERPL: 20.4 (ref 7–25)
CALCIUM SPEC-SCNC: 8.6 MG/DL (ref 8.6–10.5)
CHLORIDE SERPL-SCNC: 109 MMOL/L (ref 98–107)
CLARITY UR: ABNORMAL
CO2 BLDA-SCNC: 21.9 MMOL/L (ref 22–33)
CO2 SERPL-SCNC: 19 MMOL/L (ref 22–29)
COHGB MFR BLD: 1.6 % (ref 0–2)
COLOR UR: YELLOW
CREAT SERPL-MCNC: 1.03 MG/DL (ref 0.57–1)
D-LACTATE SERPL-SCNC: 1.9 MMOL/L (ref 0.5–2)
DEPRECATED RDW RBC AUTO: 50.3 FL (ref 37–54)
DEPRECATED RDW RBC AUTO: 53.6 FL (ref 37–54)
EOSINOPHIL # BLD AUTO: 0.04 10*3/MM3 (ref 0–0.4)
EOSINOPHIL # BLD AUTO: 0.09 10*3/MM3 (ref 0–0.4)
EOSINOPHIL NFR BLD AUTO: 0.2 % (ref 0.3–6.2)
EOSINOPHIL NFR BLD AUTO: 0.4 % (ref 0.3–6.2)
EPAP: 0
ERYTHROCYTE [DISTWIDTH] IN BLOOD BY AUTOMATED COUNT: 14.8 % (ref 12.3–15.4)
ERYTHROCYTE [DISTWIDTH] IN BLOOD BY AUTOMATED COUNT: 14.9 % (ref 12.3–15.4)
GFR SERPL CREATININE-BSD FRML MDRD: 51 ML/MIN/1.73
GLUCOSE BLDC GLUCOMTR-MCNC: 177 MG/DL (ref 70–130)
GLUCOSE BLDC GLUCOMTR-MCNC: 196 MG/DL (ref 70–130)
GLUCOSE BLDC GLUCOMTR-MCNC: 198 MG/DL (ref 70–130)
GLUCOSE BLDC GLUCOMTR-MCNC: 198 MG/DL (ref 70–130)
GLUCOSE BLDC GLUCOMTR-MCNC: 223 MG/DL (ref 70–130)
GLUCOSE SERPL-MCNC: 197 MG/DL (ref 65–99)
GLUCOSE UR STRIP-MCNC: NEGATIVE MG/DL
HCO3 BLDA-SCNC: 20.8 MMOL/L (ref 20–26)
HCT VFR BLD AUTO: 27.1 % (ref 34–46.6)
HCT VFR BLD AUTO: 28 % (ref 34–46.6)
HCT VFR BLD CALC: 26.6 %
HGB BLD-MCNC: 8.1 G/DL (ref 12–15.9)
HGB BLD-MCNC: 8.6 G/DL (ref 12–15.9)
HGB BLDA-MCNC: 8.7 G/DL (ref 14–18)
HGB UR QL STRIP.AUTO: ABNORMAL
HYALINE CASTS UR QL AUTO: ABNORMAL /LPF
IMM GRANULOCYTES # BLD AUTO: 0.09 10*3/MM3 (ref 0–0.05)
IMM GRANULOCYTES # BLD AUTO: 0.17 10*3/MM3 (ref 0–0.05)
IMM GRANULOCYTES NFR BLD AUTO: 0.5 % (ref 0–0.5)
IMM GRANULOCYTES NFR BLD AUTO: 0.8 % (ref 0–0.5)
INHALED O2 CONCENTRATION: 40 %
INR PPP: 1.48 (ref 0.85–1.16)
IPAP: 0
KETONES UR QL STRIP: ABNORMAL
LEUKOCYTE ESTERASE UR QL STRIP.AUTO: ABNORMAL
LV EF 2D ECHO EST: 50 %
LYMPHOCYTES # BLD AUTO: 0.55 10*3/MM3 (ref 0.7–3.1)
LYMPHOCYTES # BLD AUTO: 0.62 10*3/MM3 (ref 0.7–3.1)
LYMPHOCYTES NFR BLD AUTO: 2.8 % (ref 19.6–45.3)
LYMPHOCYTES NFR BLD AUTO: 3.2 % (ref 19.6–45.3)
MAXIMAL PREDICTED HEART RATE: 135 BPM
MCH RBC QN AUTO: 28.9 PG (ref 26.6–33)
MCH RBC QN AUTO: 29.2 PG (ref 26.6–33)
MCHC RBC AUTO-ENTMCNC: 29.9 G/DL (ref 31.5–35.7)
MCHC RBC AUTO-ENTMCNC: 30.7 G/DL (ref 31.5–35.7)
MCV RBC AUTO: 94 FL (ref 79–97)
MCV RBC AUTO: 97.8 FL (ref 79–97)
METHGB BLD QL: 0.6 % (ref 0–1.5)
MODALITY: ABNORMAL
MONOCYTES # BLD AUTO: 1.52 10*3/MM3 (ref 0.1–0.9)
MONOCYTES # BLD AUTO: 1.58 10*3/MM3 (ref 0.1–0.9)
MONOCYTES NFR BLD AUTO: 7 % (ref 5–12)
MONOCYTES NFR BLD AUTO: 9.3 % (ref 5–12)
NEUTROPHILS NFR BLD AUTO: 14.72 10*3/MM3 (ref 1.7–7)
NEUTROPHILS NFR BLD AUTO: 19.42 10*3/MM3 (ref 1.7–7)
NEUTROPHILS NFR BLD AUTO: 86.6 % (ref 42.7–76)
NEUTROPHILS NFR BLD AUTO: 88.8 % (ref 42.7–76)
NITRITE UR QL STRIP: NEGATIVE
NOTE: ABNORMAL
NRBC BLD AUTO-RTO: 0 /100 WBC (ref 0–0.2)
NRBC BLD AUTO-RTO: 0.1 /100 WBC (ref 0–0.2)
NT-PROBNP SERPL-MCNC: 9136 PG/ML (ref 0–1800)
OXYHGB MFR BLDV: 91.3 % (ref 94–99)
PAW @ PEAK INSP FLOW SETTING VENT: 0 CMH2O
PCO2 BLDA: 33.9 MM HG (ref 35–45)
PCO2 TEMP ADJ BLD: 33.9 MM HG (ref 35–45)
PH BLDA: 7.4 PH UNITS (ref 7.35–7.45)
PH UR STRIP.AUTO: <=5 [PH] (ref 5–8)
PH, TEMP CORRECTED: 7.4 PH UNITS
PLATELET # BLD AUTO: 159 10*3/MM3 (ref 140–450)
PLATELET # BLD AUTO: 243 10*3/MM3 (ref 140–450)
PMV BLD AUTO: 10.7 FL (ref 6–12)
PMV BLD AUTO: 11 FL (ref 6–12)
PO2 BLDA: 64.8 MM HG (ref 83–108)
PO2 TEMP ADJ BLD: 64.8 MM HG (ref 83–108)
POTASSIUM SERPL-SCNC: 5.5 MMOL/L (ref 3.5–5.2)
PROT UR QL STRIP: ABNORMAL
PROTHROMBIN TIME: 17.6 SECONDS (ref 11.5–14)
RBC # BLD AUTO: 2.77 10*6/MM3 (ref 3.77–5.28)
RBC # BLD AUTO: 2.98 10*6/MM3 (ref 3.77–5.28)
RBC # UR: ABNORMAL /HPF
REF LAB TEST METHOD: ABNORMAL
SODIUM SERPL-SCNC: 137 MMOL/L (ref 136–145)
SP GR UR STRIP: 1.02 (ref 1–1.03)
SQUAMOUS #/AREA URNS HPF: ABNORMAL /HPF
STRESS TARGET HR: 115 BPM
TOTAL RATE: 0 BREATHS/MINUTE
TROPONIN T SERPL-MCNC: <0.01 NG/ML (ref 0–0.03)
UFH PPP CHRO-ACNC: 0.1 IU/ML (ref 0.3–0.7)
UFH PPP CHRO-ACNC: 0.1 IU/ML (ref 0.3–0.7)
UROBILINOGEN UR QL STRIP: ABNORMAL
WBC # BLD AUTO: 17.02 10*3/MM3 (ref 3.4–10.8)
WBC # BLD AUTO: 21.87 10*3/MM3 (ref 3.4–10.8)
WBC UR QL AUTO: ABNORMAL /HPF

## 2020-08-13 PROCEDURE — 25010000002 HEPARIN (PORCINE) 25000-0.45 UT/250ML-% SOLUTION

## 2020-08-13 PROCEDURE — 93010 ELECTROCARDIOGRAM REPORT: CPT | Performed by: INTERNAL MEDICINE

## 2020-08-13 PROCEDURE — 25010000002 HEPARIN (PORCINE) PER 1000 UNITS

## 2020-08-13 PROCEDURE — 82962 GLUCOSE BLOOD TEST: CPT

## 2020-08-13 PROCEDURE — 85520 HEPARIN ASSAY: CPT

## 2020-08-13 PROCEDURE — 85610 PROTHROMBIN TIME: CPT

## 2020-08-13 PROCEDURE — 25010000002 VANCOMYCIN PER 500 MG

## 2020-08-13 PROCEDURE — 81001 URINALYSIS AUTO W/SCOPE: CPT | Performed by: NURSE PRACTITIONER

## 2020-08-13 PROCEDURE — 25010000002 MORPHINE PER 10 MG: Performed by: ORTHOPAEDIC SURGERY

## 2020-08-13 PROCEDURE — 25010000002 CEFTRIAXONE PER 250 MG: Performed by: ORTHOPAEDIC SURGERY

## 2020-08-13 PROCEDURE — 85520 HEPARIN ASSAY: CPT | Performed by: INTERNAL MEDICINE

## 2020-08-13 PROCEDURE — 97163 PT EVAL HIGH COMPLEX 45 MIN: CPT

## 2020-08-13 PROCEDURE — 25010000002 NALOXONE PER 1 MG: Performed by: ORTHOPAEDIC SURGERY

## 2020-08-13 PROCEDURE — 93308 TTE F-UP OR LMTD: CPT

## 2020-08-13 PROCEDURE — 70450 CT HEAD/BRAIN W/O DYE: CPT

## 2020-08-13 PROCEDURE — 71275 CT ANGIOGRAPHY CHEST: CPT

## 2020-08-13 PROCEDURE — 93308 TTE F-UP OR LMTD: CPT | Performed by: INTERNAL MEDICINE

## 2020-08-13 PROCEDURE — 85025 COMPLETE CBC W/AUTO DIFF WBC: CPT

## 2020-08-13 PROCEDURE — 80048 BASIC METABOLIC PNL TOTAL CA: CPT | Performed by: ORTHOPAEDIC SURGERY

## 2020-08-13 PROCEDURE — 25010000002 FUROSEMIDE PER 20 MG: Performed by: INTERNAL MEDICINE

## 2020-08-13 PROCEDURE — 82805 BLOOD GASES W/O2 SATURATION: CPT

## 2020-08-13 PROCEDURE — 99233 SBSQ HOSP IP/OBS HIGH 50: CPT | Performed by: INTERNAL MEDICINE

## 2020-08-13 PROCEDURE — 84484 ASSAY OF TROPONIN QUANT: CPT | Performed by: NURSE PRACTITIONER

## 2020-08-13 PROCEDURE — 87086 URINE CULTURE/COLONY COUNT: CPT | Performed by: NURSE PRACTITIONER

## 2020-08-13 PROCEDURE — 85025 COMPLETE CBC W/AUTO DIFF WBC: CPT | Performed by: ORTHOPAEDIC SURGERY

## 2020-08-13 PROCEDURE — 85730 THROMBOPLASTIN TIME PARTIAL: CPT

## 2020-08-13 PROCEDURE — 97530 THERAPEUTIC ACTIVITIES: CPT

## 2020-08-13 PROCEDURE — 71045 X-RAY EXAM CHEST 1 VIEW: CPT

## 2020-08-13 PROCEDURE — 63710000001 INSULIN LISPRO (HUMAN) PER 5 UNITS: Performed by: ORTHOPAEDIC SURGERY

## 2020-08-13 PROCEDURE — 93325 DOPPLER ECHO COLOR FLOW MAPG: CPT

## 2020-08-13 PROCEDURE — 36600 WITHDRAWAL OF ARTERIAL BLOOD: CPT

## 2020-08-13 PROCEDURE — 0 IOPAMIDOL PER 1 ML: Performed by: INTERNAL MEDICINE

## 2020-08-13 PROCEDURE — 93321 DOPPLER ECHO F-UP/LMTD STD: CPT | Performed by: INTERNAL MEDICINE

## 2020-08-13 PROCEDURE — 83605 ASSAY OF LACTIC ACID: CPT | Performed by: NURSE PRACTITIONER

## 2020-08-13 PROCEDURE — 93321 DOPPLER ECHO F-UP/LMTD STD: CPT

## 2020-08-13 PROCEDURE — 83880 ASSAY OF NATRIURETIC PEPTIDE: CPT | Performed by: INTERNAL MEDICINE

## 2020-08-13 PROCEDURE — 93005 ELECTROCARDIOGRAM TRACING: CPT | Performed by: NURSE PRACTITIONER

## 2020-08-13 PROCEDURE — 93325 DOPPLER ECHO COLOR FLOW MAPG: CPT | Performed by: INTERNAL MEDICINE

## 2020-08-13 PROCEDURE — P9612 CATHETERIZE FOR URINE SPEC: HCPCS

## 2020-08-13 RX ORDER — BISACODYL 10 MG
10 SUPPOSITORY, RECTAL RECTAL DAILY PRN
Status: DISCONTINUED | OUTPATIENT
Start: 2020-08-13 | End: 2020-08-19 | Stop reason: HOSPADM

## 2020-08-13 RX ORDER — METOPROLOL TARTRATE 5 MG/5ML
2.5 INJECTION INTRAVENOUS ONCE
Status: COMPLETED | OUTPATIENT
Start: 2020-08-13 | End: 2020-08-13

## 2020-08-13 RX ORDER — HEPARIN SODIUM 10000 [USP'U]/100ML
18 INJECTION, SOLUTION INTRAVENOUS
Status: DISCONTINUED | OUTPATIENT
Start: 2020-08-13 | End: 2020-08-16

## 2020-08-13 RX ORDER — DILTIAZEM HCL IN NACL,ISO-OSM 125 MG/125
5-15 PLASTIC BAG, INJECTION (ML) INTRAVENOUS
Status: DISCONTINUED | OUTPATIENT
Start: 2020-08-13 | End: 2020-08-18

## 2020-08-13 RX ORDER — HEPARIN SODIUM 1000 [USP'U]/ML
3000 INJECTION, SOLUTION INTRAVENOUS; SUBCUTANEOUS ONCE
Status: COMPLETED | OUTPATIENT
Start: 2020-08-13 | End: 2020-08-13

## 2020-08-13 RX ORDER — FUROSEMIDE 10 MG/ML
40 INJECTION INTRAMUSCULAR; INTRAVENOUS ONCE
Status: COMPLETED | OUTPATIENT
Start: 2020-08-13 | End: 2020-08-13

## 2020-08-13 RX ORDER — HEPARIN SODIUM 1000 [USP'U]/ML
4500 INJECTION, SOLUTION INTRAVENOUS; SUBCUTANEOUS ONCE
Status: COMPLETED | OUTPATIENT
Start: 2020-08-13 | End: 2020-08-13

## 2020-08-13 RX ADMIN — NALOXONE HYDROCHLORIDE 0.2 MG: 0.4 INJECTION, SOLUTION INTRAMUSCULAR; INTRAVENOUS; SUBCUTANEOUS at 08:18

## 2020-08-13 RX ADMIN — IOPAMIDOL 72 ML: 755 INJECTION, SOLUTION INTRAVENOUS at 09:00

## 2020-08-13 RX ADMIN — INSULIN LISPRO 2 UNITS: 100 INJECTION, SOLUTION INTRAVENOUS; SUBCUTANEOUS at 17:37

## 2020-08-13 RX ADMIN — INSULIN LISPRO 2 UNITS: 100 INJECTION, SOLUTION INTRAVENOUS; SUBCUTANEOUS at 11:15

## 2020-08-13 RX ADMIN — HEPARIN SODIUM 18 UNITS/KG/HR: 10000 INJECTION, SOLUTION INTRAVENOUS at 13:32

## 2020-08-13 RX ADMIN — HEPARIN SODIUM 3000 UNITS: 1000 INJECTION, SOLUTION INTRAVENOUS; SUBCUTANEOUS at 20:13

## 2020-08-13 RX ADMIN — VANCOMYCIN HYDROCHLORIDE 750 MG: 750 INJECTION, SOLUTION INTRAVENOUS at 16:27

## 2020-08-13 RX ADMIN — CEFTRIAXONE SODIUM 1 G: 1 INJECTION, POWDER, FOR SOLUTION INTRAMUSCULAR; INTRAVENOUS at 20:13

## 2020-08-13 RX ADMIN — INSULIN LISPRO 2 UNITS: 100 INJECTION, SOLUTION INTRAVENOUS; SUBCUTANEOUS at 08:53

## 2020-08-13 RX ADMIN — Medication 5 MG/HR: at 08:57

## 2020-08-13 RX ADMIN — HEPARIN SODIUM 4500 UNITS: 1000 INJECTION, SOLUTION INTRAVENOUS; SUBCUTANEOUS at 13:28

## 2020-08-13 RX ADMIN — FUROSEMIDE 40 MG: 10 INJECTION, SOLUTION INTRAMUSCULAR; INTRAVENOUS at 13:28

## 2020-08-13 RX ADMIN — METOPROLOL TARTRATE 2.5 MG: 5 INJECTION INTRAVENOUS at 05:59

## 2020-08-13 RX ADMIN — MORPHINE SULFATE 3 MG: 4 INJECTION, SOLUTION INTRAMUSCULAR; INTRAVENOUS at 05:15

## 2020-08-13 NOTE — NURSING NOTE
Patient came to the floor very lethargic, she was on a 10L non-re breather, I was able to titrate her down to 6L humidified NC, troncoso was D'C, bladder scan was performed at 0030 pt had 325ml, pt started to wake up around 5am, her heart rate increased 115-160 Afib RVR, she had difficulty responding, she is disoriented x4, she responds to painful stimuli. APRN paged, morphine was given for pain/agitation, 2.5mg of lopressor given,  Blood gas performed, labs and chest x-ray taken. Pt condition is improving. Vitals are stable.

## 2020-08-13 NOTE — PLAN OF CARE
Problem: Patient Care Overview  Goal: Plan of Care Review  Outcome: Ongoing (interventions implemented as appropriate)  Flowsheets (Taken 8/13/2020 1533)  Outcome Summary: Patient limited today by lethargy, maintained eyes closed for most of session except for when sitting EOB and during t/f. Dependent to t/f OOB, max assist x2 to stand pivot t/f from bed to chair. [facility] Recommend OT consult for ADL and adaptive equipment training. Will continue to progress as able.

## 2020-08-13 NOTE — THERAPY EVALUATION
"Patient Name: Analilia De Leon  : 1935    MRN: [id number]                              Today's Date: 2020       Admit Date: 2020    Visit Dx:     ICD-10-CM ICD-9-CM   1. Hyperkalemia E87.5 276.7     Patient Active Problem List   Diagnosis   • Iron deficiency anemia   • Hypertension   • Atrial fibrillation (CMS/HCC)   • Hyperlipidemia   • CAD (coronary artery disease)   • Hip fracture (CMS/HCC)   • Acute UTI (urinary tract infection)   • Altered mental status   • Fall   • Status post hip hemiarthroplasty   • Acute respiratory failure with hypoxia (CMS/HCC)     Past Medical History:   Diagnosis Date   • Ataxia 2017   • Atrial fibrillation (CMS/HCC)    • BPPV (benign paroxysmal positional vertigo) 2017   • Chronic anticoagulation    • Facial numbness     takes Keppra for \"facial numbness\"    • History of blood transfusion    • Hypertension    • Microangiopathy (CMS/HCC) 2017   • T2DM (type 2 diabetes mellitus) (CMS/[facility])    • TIA (transient ischemic attack) 3/17/2017   • Weakness 2017     Past Surgical History:   Procedure Laterality Date   • CHOLECYSTECTOMY     • ENDOSCOPY  2015    showed 2 ulcerated lesions in the gastric antrum   • HIP HEMIARTHROPLASTY Right 2020    Procedure: HIP BI-POLAR RIGHT;  Surgeon: Ramirez Lopez MD;  Location: [facility];  Service: Orthopedics;  Laterality: Right;   • HYSTERECTOMY     • KNEE SURGERY Right    • OOPHORECTOMY       General Information     Row Name 20 1604 20 1528       PT Evaluation Time/Intention    Document Type  --  -LR  evaluation  -LR    Mode of Treatment  --  -LR  physical therapy;individual therapy  -LR    Row Name 20 1604 20 1528       General Information    Patient Profile Reviewed?  --  -LR  yes  -LR    Prior Level of Function  --  -LR  independent:;all household mobility;community mobility;gait;transfer;bed mobility;ADL's;using stairs  -LR    Existing Precautions/Restrictions  --  -LR  fall;right;hip, " Tolerating meloxicam without dyspepsia or change in creatinine   posterior  -LR    Barriers to Rehab  --  -LR  medically complex  -LR    Row Name 08/13/20 1604 08/13/20 1528       Relationship/Environment    Lives With  --  -LR  child(michael), adult lives with son who works during the day  -LR    Row Name 08/13/20 1604 08/13/20 1528       Resource/Environmental Concerns    Current Living Arrangements  --  -LR  home/apartment/condo  -LR    Row Name 08/13/20 1604 08/13/20 1528       Home Main Entrance    Number of Stairs, Main Entrance  --  -LR  six  -LR    Stair Railings, Main Entrance  --  -LR  railings on both sides of stairs  -LR    Row Name 08/13/20 1604 08/13/20 1528       Stairs Within Home, Primary    Stairs, Within Home, Primary  --  -LR  resides on main floor of home  -LR    Number of Stairs, Within Home, Primary  --  -LR  none  -LR    Row Name 08/13/20 1604 08/13/20 1528       Cognitive Assessment/Intervention- PT/OT    Orientation Status (Cognition)  --  -LR  unable/difficult to assess  -LR    Cognitive Assessment/Intervention Comment  --  -LR  patient would not arouse enough to answer orientation questions, maintained eyes closed most of eval  -LR    Row Name 08/13/20 1604 08/13/20 1528       Safety Issues, Functional Mobility    Safety Issues Affecting Function (Mobility)  --  -LR  ability to follow commands;at risk behavior observed;awareness of need for assistance;insight into deficits/self awareness;judgment;safety precautions follow-through/compliance;sequencing abilities;safety precaution awareness  -LR    Impairments Affecting Function (Mobility)  --  -LR  balance;strength;cognition;pain;postural/trunk control;endurance/activity tolerance;range of motion (ROM)  -LR      User Key  (r) = Recorded By, (t) = Taken By, (c) = Cosigned By    Initials Name Provider Type    LR Natasha Monroe, PT Physical Therapist        Mobility     Row Name 08/13/20 1528          Bed Mobility Assessment/Treatment    Bed Mobility Assessment/Treatment  supine-sit  -LR     Supine-Sit  [place] (Bed Mobility)  verbal cues;dependent (less than 25% patient effort);2 person assist  -LR     Assistive Device (Bed Mobility)  head of bed elevated;draw sheet;bed rails  -LR     Comment (Bed Mobility)  Verbal cues to move LEs towards EOB and to scoot hips towards EOB. Verbal cues to push up from bed to raise trunk into sitting and to scoot hips out. Little to no assist provided from patient. Used draw sheet to assist.   -     Row Name 08/13/20 1528          Transfer Assessment/Treatment    Comment (Transfers)  Verbal cues to push up from bed to stand and to reach back for chair to lower into sitting. L knee blocked. Verbal cues to stand and take steps from bed to chair. Able to stand pivot from bed to chair with cues for sequencing. Required 2 attempts to successfully stand from bed.   -     Row Name 08/13/20 1528          Bed-Chair Transfer    Bed-Chair [place] (Transfers)  verbal cues;maximum assist (25% patient effort);2 person assist  -LR     Assistive Device (Bed-Chair Transfers)  other (see comments) B UE support, support at gait belt  -     Row Name 08/13/20 1528          Sit-Stand Transfer    Sit-Stand [place] (Transfers)  verbal cues;maximum assist (25% patient effort);2 person assist  -LR     Assistive Device (Sit-Stand Transfers)  other (see comments) B UE support, support at gait belt  -     Row Name 08/13/20 1528          Gait/Stairs Assessment/Training    [place] Level (Gait)  unable to assess;not tested  -LR     Comment (Gait/Stairs)  Unable to take steps from bed to chair, only able to perform stand pivot.   -     Row Name 08/13/20 1528          Mobility Assessment/Intervention    Extremity Weight-bearing Status  right lower extremity  -LR     Right Lower Extremity (Weight-bearing Status)  weight-bearing as tolerated (WBAT)  -LR       User Key  (r) = Recorded By, (t) = Taken By, (c) = Cosigned By    Initials Name Provider Type    LR Natasha Monroe, PT  Physical Therapist        Obj/Interventions     Row Name 08/13/20 1528          General ROM    GENERAL ROM COMMENTS  L LE AROM WFL; R hip AROM impaired 75% d/t pain  -LR     Row Name 08/13/20 1528          MMT (Manual Muscle Testing)    General MMT Comments  L LE WFL; R hip functionally 2-/5, no knee buckling with weight bearing, unable to perform SLR independently.   -LR     Row Name 08/13/20 1528          Therapeutic Exercise    Lower Extremity (Therapeutic Exercise)  heel slides, right;LAQ (long arc quad), right;SLR (straight leg raise), right  -LR     Lower Extremity Range of Motion (Therapeutic Exercise)  hip abduction/adduction, right;ankle dorsiflexion/plantar flexion, bilateral  -LR     Exercise Type (Therapeutic Exercise)  PROM (passive range of motion)  -LR     Position (Therapeutic Exercise)  seated  -LR     Sets/Reps (Therapeutic Exercise)  x10 reps each  -LR     Comment (Therapeutic Exercise)  cues for technique; no assist noted from patient  -LR     Row Name 08/13/20 1528          Dynamic Sitting Balance    Level of [place], Reaches Outside Midline (Sitting, Dynamic Balance)  minimal assist, 75% patient effort  -LR     Sitting Position, Reaches Outside Midline (Sitting, Dynamic Balance)  sitting on edge of bed  -LR     Row Name 08/13/20 1528          Static Standing Balance    Level of [place] (Supported Standing, Static Balance)  maximal assist, 25 to 49% patient effort;2 person assist  -LR     Time Able to Maintain Position (Supported Standing, Static Balance)  15 to 30 seconds  -LR     Assistive Device Utilized (Supported Standing, Static Balance)  other (see comments) B UE support, support at gait belt  -LR     Row Name 08/13/20 1528          Dynamic Standing Balance    Level of [place], Reaches Outside Midline (Standing, Dynamic Balance)  maximal assist, 25 to 49% patient effort;2 person assist  -LR     Time Able to Maintain Position, Reaches Outside Midline (Standing, Dynamic  Balance)  15 to 30 seconds  -LR     Assistive Device Utilized (Supported Standing, Dynamic Balance)  other (see comments) B UE support, support at gait belt  -LR     Row Name 08/13/20 1528          Sensory Assessment/Intervention    Sensory General Assessment  no sensation deficits identified unable to assess as patient provided no verbal response  -LR       User Key  (r) = Recorded By, (t) = Taken By, (c) = Cosigned By    Initials Name Provider Type    LR Natasha Monroe, PT Physical Therapist        Goals/Plan     Row Name 08/13/20 1528          Bed Mobility Goal 1 (PT)    Activity/Assistive Device (Bed Mobility Goal 1, PT)  sit to supine/supine to sit  -LR     [place] Level/Cues Needed (Bed Mobility Goal 1, PT)  moderate assist (50-74% patient effort);2 person assist  -LR     Time Frame (Bed Mobility Goal 1, PT)  long term goal (LTG);5 days  -LR     Progress/Outcomes (Bed Mobility Goal 1, PT)  goal ongoing  -LR     Row Name 08/13/20 1528          Transfer Goal 1 (PT)    Activity/Assistive Device (Transfer Goal 1, PT)  sit-to-stand/stand-to-sit;walker, rolling  -LR     [place] Level/Cues Needed (Transfer Goal 1, PT)  moderate assist (50-74% patient effort);2 person assist  -LR     Time Frame (Transfer Goal 1, PT)  long term goal (LTG);5 days  -LR     Progress/Outcome (Transfer Goal 1, PT)  goal ongoing  -LR     Row Name 08/13/20 1528          Gait Training Goal 1 (PT)    Activity/Assistive Device (Gait Training Goal 1, PT)  gait (walking locomotion);walker, rolling  -LR     [place] Level (Gait Training Goal 1, PT)  moderate assist (50-74% patient effort);2 person assist  -LR     Distance (Gait Goal 1, PT)  10 feet  -LR     Time Frame (Gait Training Goal 1, PT)  long term goal (LTG);5 days  -LR     Progress/Outcome (Gait Training Goal 1, PT)  goal ongoing  -LR       User Key  (r) = Recorded By, (t) = Taken By, (c) = Cosigned By    Initials Name Provider Type    LR Natasha Monroe, PT  Physical Therapist        Clinical Impression     Row Name 08/13/20 1528          Pain Assessment    Additional Documentation  Pain Scale: FACES Pre/Post-Treatment (Group)  -LR     Row Name 08/13/20 1528          Pain Scale: Numbers Pre/Post-Treatment    Pain Location - Side  Right  -LR     Pain Location - Orientation  posterior  -LR     Pain Location  hip  -LR     Pain Intervention(s)  Ambulation/increased activity;Repositioned  -LR     Row Name 08/13/20 1528          Pain Scale: FACES Pre/Post-Treatment    Pain: FACES Scale, Pretreatment  2-->hurts little bit  -LR     Pain: FACES Scale, Post-Treatment  6-->hurts even more  -LR     Row Name 08/13/20 1528          Plan of Care Review    Plan of Care Reviewed With  patient  -LR     Row Name 08/13/20 1528          Physical Therapy Clinical Impression    Patient/Family Goals Statement (PT Clinical Impression)  none stated  -LR     Criteria for Skilled Interventions Met (PT Clinical Impression)  yes;treatment indicated  -LR     Rehab Potential (PT Clinical Summary)  fair, will monitor progress closely  -LR     Row Name 08/13/20 1528          Positioning and Restraints    Pre-Treatment Position  in bed  -LR     Post Treatment Position  chair  -LR     In Chair  notified nsg;reclined;sitting;call light within reach;encouraged to call for assist;exit alarm on;with family/caregiver;legs elevated;compression device;on mechanical lift sling;waffle cushion  -LR       User Key  (r) = Recorded By, (t) = Taken By, (c) = Cosigned By    Initials Name Provider Type    LR Natasha Monroe, PT Physical Therapist        Outcome Measures     Row Name 08/13/20 1528          How much help from another person do you currently need...    Turning from your back to your side while in flat bed without using bedrails?  1  -LR     Moving from lying on back to sitting on the side of a flat bed without bedrails?  1  -LR     Moving to and from a bed to a chair (including a wheelchair)?  2  -LR      Standing up from a chair using your arms (e.g., wheelchair, bedside chair)?  2  -LR     Climbing 3-5 steps with a railing?  1  -LR     To walk in hospital room?  1  -LR     AM-PAC 6 Clicks Score (PT)  8  -LR     Row Name 08/13/20 1528          Functional Assessment    Outcome Measure Options  AM-PAC 6 Clicks Basic Mobility (PT)  -LR       User Key  (r) = Recorded By, (t) = Taken By, (c) = Cosigned By    Initials Name Provider Type    LR Natasha Monroe, PT Physical Therapist        Physical Therapy Education                 Title: PT OT SLP Therapies (Done)     Topic: Physical Therapy (Done)     Point: Mobility training (Done)     Description:   Instruct learner(s) on safety and technique for assisting patient out of bed, chair or wheelchair.  Instruct in the proper use of assistive devices, such as walker, crutches, cane or brace.              Patient Friendly Description:   It's important to get you on your feet again, but we need to do so in a way that is safe for you. Falling has serious consequences, and your personal safety is the most important thing of all.        When it's time to get out of bed, one of us or a family member will sit next to you on the bed to give you support.     If your doctor or nurse tells you to use a walker, crutches, a cane, or a brace, be sure you use it every time you get out of bed, even if you think you don't need it.    Learning Progress Summary           Patient Acceptance, E,D, VU,NR by LR at 8/13/2020 1528    Comment:  Educated on posterior hip precautions, weight bearing status, correct supine to sit t/f technique, correct sit<->stand t/f technique, safety with mobility, and progression of POC.   Family Acceptance, E,D, VU,NR by LR at 8/13/2020 1528    Comment:  Educated on posterior hip precautions, weight bearing status, correct supine to sit t/f technique, correct sit<->stand t/f technique, safety with mobility, and progression of POC.                   Point:  Home exercise program (Done)     Description:   Instruct learner(s) on appropriate technique for monitoring, assisting and/or progressing patient with therapeutic exercises and activities.              Learning Progress Summary           Patient Acceptance, E,D, VU,NR by LR at 8/13/2020 1528    Comment:  Educated on posterior hip precautions, weight bearing status, correct supine to sit t/f technique, correct sit<->stand t/f technique, safety with mobility, and progression of POC.   Family Acceptance, E,D, VU,NR by LR at 8/13/2020 1528    Comment:  Educated on posterior hip precautions, weight bearing status, correct supine to sit t/f technique, correct sit<->stand t/f technique, safety with mobility, and progression of POC.                   Point: Body mechanics (Done)     Description:   Instruct learner(s) on proper positioning and spine alignment for patient and/or caregiver during mobility tasks and/or exercises.              Learning Progress Summary           Patient Acceptance, E,D, VU,NR by LR at 8/13/2020 1528    Comment:  Educated on posterior hip precautions, weight bearing status, correct supine to sit t/f technique, correct sit<->stand t/f technique, safety with mobility, and progression of POC.   Family Acceptance, E,D, VU,NR by LR at 8/13/2020 1528    Comment:  Educated on posterior hip precautions, weight bearing status, correct supine to sit t/f technique, correct sit<->stand t/f technique, safety with mobility, and progression of POC.                   Point: Precautions (Done)     Description:   Instruct learner(s) on prescribed precautions during mobility and gait tasks              Learning Progress Summary           Patient Acceptance, E,D, VU,NR by LR at 8/13/2020 1528    Comment:  Educated on posterior hip precautions, weight bearing status, correct supine to sit t/f technique, correct sit<->stand t/f technique, safety with mobility, and progression of POC.   Family Acceptance, E,D, VU,NR by  LR at 8/13/2020 1528    Comment:  Educated on posterior hip precautions, weight bearing status, correct supine to sit t/f technique, correct sit<->stand t/f technique, safety with mobility, and progression of POC.                               User Key     Initials Effective Dates Name Provider Type Discipline    LR 06/19/15 -  Natasha Monroe, PT Physical Therapist PT              PT Recommendation and Plan  Planned Therapy Interventions (PT Eval): balance training, bed mobility training, gait training, home exercise program, ROM (range of motion), patient/family education, strengthening, transfer training  Outcome Summary/Treatment Plan (PT)  Anticipated Equipment Needs at Discharge (PT): other (see comments)(TBD)  Anticipated Discharge Disposition (PT): skilled nursing facility(family requesting [facility])  Plan of Care Reviewed With: patient  Outcome Summary: Patient limited today by lethargy, maintained eyes closed for most of session except for when sitting EOB and during t/f. Dependent to t/f OOB, max assist x2 to stand pivot t/f from bed to chair. Recommend SNF at d/c, family requesting [facility]. Recommend OT consult for ADL and adaptive equipment training. Will continue to progress as able.     Time Calculation:   PT Charges     Row Name 08/13/20 1528             Time Calculation    Start Time  1528  -LR      PT Received On  08/13/20  -LR      PT Goal Re-Cert Due Date  08/23/20  -LR         Time Calculation- PT    Total Timed Code Minutes- PT  10 minute(s)  -LR         Timed Charges    74488 - PT Therapeutic Exercise Minutes  2  -LR      55556 - PT Therapeutic Activity Minutes  8  -LR        User Key  (r) = Recorded By, (t) = Taken By, (c) = Cosigned By    Initials Name Provider Type    LR Natasha Monroe, PT Physical Therapist        Therapy Charges for Today     Code Description Service Date Service Provider Modifiers Qty    71470910025  PT THERAPEUTIC ACT EA 15 MIN 8/13/2020 Natasha Monroe  Elaine, PT GP 1    27564452256 HC PT THER SUPP EA 15 MIN 8/13/2020 Natasha Monroe, PT GP 6    78589985347 HC PT EVAL HIGH COMPLEXITY 3 8/13/2020 Natasha Monroe, PT GP 1          PT G-Codes  Outcome Measure Options: AM-PAC 6 Clicks Basic Mobility (PT)  AM-PAC 6 Clicks Score (PT): 8    Natasha Monroe, PT  8/13/2020

## 2020-08-13 NOTE — PLAN OF CARE
Problem: Patient Care Overview  Goal: Plan of Care Review  Outcome: Ongoing (interventions implemented as appropriate)  Flowsheets (Taken 8/13/2020 1802)  Progress: improving  Plan of Care Reviewed With: patient  Outcome Summary: Patient remains disoriented X3. garbled incoherent speech at times. Narcan given in the morning pt was able to open eyes following administration and more alert. Narcotics have ghulam held. Pt in A fib with RVR in the morning Cardizem drip started. Stopped at 1700 rate controlled as of now still in a fib. Pt on heparin drip. Has been kept NPO since pt not responsive enough to swallow. Unable to void I&O cathed at 1510 900 ml removed. Bladder scanned at 1800 0 ml present.  K+ 5.5 IV Lasix given. CT of head and chest showed no abnormalities. Echo complete. Pt up in chair currently was able to stand and pivot with PT with assist of 2. No Bm since Sunday per daughter Suppository ordered by MD. Will  continue to monitor.

## 2020-08-13 NOTE — PROGRESS NOTES
"Analilia CARCAMO Moises  [id number]  1935    /72   Pulse 93   Temp 97.9 °F (36.6 °C) (Axillary)   Resp 16   Ht 154.9 cm (61\")   Wt 59 kg (130 lb)   SpO2 95%   BMI 24.56 kg/m²     Lab Results (last 24 hours)     Procedure Component Value Units Date/Time    proBNP [[id number]]  (Abnormal) Collected:  08/13/20 0507    Specimen:  Blood Updated:  08/13/20 0922     proBNP 9,136.0 pg/mL     Narrative:       Among patients with dyspnea, NT-proBNP is highly sensitive for the detection of acute congestive heart failure. In addition NT-proBNP of <300 pg/ml effectively rules out acute congestive heart failure with 99% negative predictive value.    Results may be falsely decreased if patient taking Biotin.      Blood Culture - Blood, Hand, Left [[id number]] Collected:  08/11/20 2054    Specimen:  Blood from Hand, Left Updated:  08/13/20 0815     Blood Culture No growth at 24 hours    POC Glucose Once [[id number]]  (Abnormal) Collected:  08/13/20 0724    Specimen:  Blood Updated:  08/13/20 0726     Glucose 196 mg/dL     Urinalysis With Culture If Indicated - Urine, Catheter In/Out [[id number]]  (Abnormal) Collected:  08/13/20 0627    Specimen:  Urine, Catheter In/Out Updated:  08/13/20 0700     Color, UA Yellow     Appearance, UA Cloudy     pH, UA <=5.0     Specific Gravity, UA 1.017     Glucose, UA Negative     Ketones, UA 15 mg/dL (1+)     Bilirubin, UA Negative     Blood, UA Moderate (2+)     Protein, UA 30 mg/dL (1+)     Leuk Esterase, UA Moderate (2+)     Nitrite, UA Negative     Urobilinogen, UA 0.2 E.U./dL    Urinalysis, Microscopic Only - Urine, Catheter In/Out [[id number]]  (Abnormal) Collected:  08/13/20 0627    Specimen:  Urine, Catheter In/Out Updated:  08/13/20 0700     RBC, UA 7-12 /HPF      WBC, UA 21-30 /HPF      Bacteria, UA None Seen /HPF      Squamous Epithelial Cells, UA 3-6 /HPF      Hyaline Casts, UA 7-12 /LPF      Methodology Automated Microscopy    Urine Culture - Urine, Urine, Catheter In/Out [[id number]] " Collected:  08/13/20 0627    Specimen:  Urine, Catheter In/Out Updated:  08/13/20 0700    Lactic Acid, Plasma [[id number]]  (Normal) Collected:  08/13/20 0553    Specimen:  Blood Updated:  08/13/20 0635     Lactate 1.9 mmol/L      Comment: Falsely depressed results may occur on samples drawn from patients receiving N-Acetylcysteine (NAC) or Metamizole.       Troponin [[id number]]  (Normal) Collected:  08/13/20 0507    Specimen:  Blood Updated:  08/13/20 0634     Troponin T <0.010 ng/mL     Narrative:       Troponin T Reference Range:  <= 0.03 ng/mL-   Negative for AMI  >0.03 ng/mL-     Abnormal for myocardial necrosis.  Clinicians would have to utilize clinical acumen, EKG, Troponin and serial changes to determine if it is an Acute Myocardial Infarction or myocardial injury due to an underlying chronic condition.       Results may be falsely decreased if patient taking Biotin.      Basic Metabolic Panel [[id number]]  (Abnormal) Collected:  08/13/20 0507    Specimen:  Blood Updated:  08/13/20 0555     Glucose 197 mg/dL      BUN 21 mg/dL      Creatinine 1.03 mg/dL      Sodium 137 mmol/L      Potassium 5.5 mmol/L      Chloride 109 mmol/L      CO2 19.0 mmol/L      Calcium 8.6 mg/dL      eGFR Non African Amer 51 mL/min/1.73      BUN/Creatinine Ratio 20.4     Anion Gap 9.0 mmol/L     Narrative:       GFR Normal >60  Chronic Kidney Disease <60  Kidney Failure <15      Blood Gas, Arterial With Co-Ox [[id number]]  (Abnormal) Collected:  08/13/20 0548    Specimen:  Arterial Blood Updated:  08/13/20 0549     Site Left Radial     Bryon's Test N/A     pH, Arterial 7.397 pH units      pCO2, Arterial 33.9 mm Hg      Comment: 84 Value below reference range        pO2, Arterial 64.8 mm Hg      Comment: 84 Value below reference range        HCO3, Arterial 20.8 mmol/L      Base Excess, Arterial -3.6 mmol/L      Hemoglobin, Blood Gas 8.7 g/dL      Comment: 84 Value below reference range        Hematocrit, Blood Gas 26.6 %       Oxyhemoglobin 91.3 %      Comment: 84 Value below reference range        Methemoglobin 0.60 %      Carboxyhemoglobin 1.6 %      CO2 Content 21.9 mmol/L      Temperature 37.0 C      Barometric Pressure for Blood Gas --     Comment: N/A        Modality Nasal Cannula     FIO2 40 %      Rate 0 Breaths/minute      PIP 0 cmH2O      Comment: Meter: [id number]     :  [id number]        IPAP 0     EPAP 0     Note --     pH, Temp Corrected 7.397 pH Units      pCO2, Temperature Corrected 33.9 mm Hg      pO2, Temperature Corrected 64.8 mm Hg     CBC & Differential [[id number]] Collected:  08/13/20 0507    Specimen:  Blood Updated:  08/13/20 0542    Narrative:       The following orders were created for panel order CBC & Differential.  Procedure                               Abnormality         Status                     ---------                               -----------         ------                     CBC Auto Differential[[id number]]        Abnormal            Final result                 Please view results for these tests on the individual orders.    CBC Auto Differential [[id number]]  (Abnormal) Collected:  08/13/20 0507    Specimen:  Blood Updated:  08/13/20 0542     WBC 21.87 10*3/mm3      RBC 2.98 10*6/mm3      Hemoglobin 8.6 g/dL      Hematocrit 28.0 %      MCV 94.0 fL      MCH 28.9 pg      MCHC 30.7 g/dL      RDW 14.8 %      RDW-SD 50.3 fl      MPV 11.0 fL      Platelets 243 10*3/mm3      Neutrophil % 88.8 %      Lymphocyte % 2.8 %      Monocyte % 7.0 %      Eosinophil % 0.4 %      Basophil % 0.2 %      Immature Grans % 0.8 %      Neutrophils, Absolute 19.42 10*3/mm3      Lymphocytes, Absolute 0.62 10*3/mm3      Monocytes, Absolute 1.52 10*3/mm3      Eosinophils, Absolute 0.09 10*3/mm3      Basophils, Absolute 0.05 10*3/mm3      Immature Grans, Absolute 0.17 10*3/mm3      nRBC 0.1 /100 WBC     POC Glucose Once [[id number]]  (Abnormal) Collected:  08/13/20 0539    Specimen:  Blood Updated:  08/13/20 0540      Glucose 223 mg/dL     Blood Culture - Blood, Hand, Left [[id number]] Collected:  08/12/20 0013    Specimen:  Blood from Hand, Left Updated:  08/13/20 0015     Blood Culture No growth at 24 hours    POC Glucose Once [[id number]]  (Abnormal) Collected:  08/12/20 2041    Specimen:  Blood Updated:  08/12/20 2043     Glucose 200 mg/dL     POC Glucose Once [[id number]]  (Abnormal) Collected:  08/12/20 1147    Specimen:  Blood Updated:  08/12/20 1155     Glucose 161 mg/dL           Patient Care Team:  Sravan Franco APRN as PCP - General (Family Medicine)    SUBJECTIVE  Pain well controlled.  Physical therapy limited by sleepiness currently.    PHYSICAL EXAM  Incision benign  Minimal thigh swelling  Neurovascularly intact to knees and toes       Hip fracture (CMS/HCC)    Iron deficiency anemia    Hypertension    Atrial fibrillation (CMS/HCC)    Hyperlipidemia    CAD (coronary artery disease)    Acute UTI (urinary tract infection)    Altered mental status    Fall    Status post hip hemiarthroplasty    Acute respiratory failure with hypoxia (CMS/HCC)      PLAN / DISPOSITION:  Hemoglobin 8.6 today.  No transfusion anticipated  Family would like to transfer to [facility] at hospital discharge.    Ramirez Lopez MD  08/13/20  10:45

## 2020-08-13 NOTE — PROGRESS NOTES
[facility] Medicine Services  PROGRESS NOTE    Patient Name: Analilia De Leon  : 1935  MRN: [id number]    Date of Admission: 2020  Primary Care Physician: Sravan Franco APRN    Subjective   Subjective     CC:  Right hip fracture    HPI:  Pt was significantly hypoxic upon returning to the floor last pm after OR.  Also with AMS that improved after narcan.  ABG just showed hypoxia. Pt now on 4L O2 with sats in the mid 90s. Now in Afib with RVR but has yet to receive her beta blocker due to her mental status earlier.     Review of Systems  Gen- No fevers, chills  CV- No chest pain, palpitations  Resp- No cough, dyspnea  GI- No N/V/D, abd pain    Objective   Objective     Vital Signs:   Temp:  [97.2 °F (36.2 °C)-99.9 °F (37.7 °C)] 99.1 °F (37.3 °C)  Heart Rate:  [] 103  Resp:  [16] 16  BP: ()/(52-90) 111/53        Physical Exam:  Constitutional: No acute distress, awake but minimally interactive this am  HENT: NCAT, mucous membranes moist  Respiratory: Clear to auscultation bilaterally, respiratory effort normal   Cardiovascular: irregularly irregular with rates in the mid 80s, appears afib on tele,  no murmurs, rubs, or gallops, palpable pedal pulses bilaterally  Gastrointestinal: Positive bowel sounds, soft, nontender, nondistended  Musculoskeletal: No bilateral ankle edema, RLE wound site c/d/i  Psychiatric: Appropriate affect, cooperative  Neurologic: alert, unable to answer questions appropriately this am  Skin: No rashes    Results Reviewed:  Results from last 7 days   Lab Units 20  0507 20  0344 20   WBC 10*3/mm3 21.87* 12.15*  --  14.58*   HEMOGLOBIN g/dL 8.6* 8.7*  --  9.3*   HEMATOCRIT % 28.0* 30.3*  --  32.0*   PLATELETS 10*3/mm3 243 206  --  225   INR   --   --  1.28*  --    PROCALCITONIN ng/mL  --   --  0.07  --      Results from last 7 days   Lab Units 20  0507 20  0344 20  2028   SODIUM mmol/L  137 140 138   POTASSIUM mmol/L 5.5* 5.0 5.6*   CHLORIDE mmol/L 109* 106 106   CO2 mmol/L 19.0* 24.0 21.0*   BUN mg/dL 21 18 13   CREATININE mg/dL 1.03* 0.90 0.99   GLUCOSE mg/dL 197* 150* 178*   CALCIUM mg/dL 8.6 8.9 9.2   ALT (SGPT) U/L  --   --  15   AST (SGOT) U/L  --   --  23   TROPONIN T ng/mL <0.010  --  <0.010     Estimated Creatinine Clearance: 33 mL/min (A) (by C-G formula based on SCr of 1.03 mg/dL (H)).    Microbiology Results Abnormal     Procedure Component Value - Date/Time    Blood Culture - Blood, Hand, Left [[id number]] Collected:  08/11/20 2054    Lab Status:  Preliminary result Specimen:  Blood from Hand, Left Updated:  08/13/20 0815     Blood Culture No growth at 24 hours    Blood Culture - Blood, Hand, Left [[id number]] Collected:  08/12/20 0013    Lab Status:  Preliminary result Specimen:  Blood from Hand, Left Updated:  08/13/20 0015     Blood Culture No growth at 24 hours    COVID PRE-OP / PRE-PROCEDURE SCREENING ORDER (NO ISOLATION) - Swab, Nasopharynx [[id number]] Collected:  08/12/20 0038    Lab Status:  Final result Specimen:  Swab from Nasopharynx Updated:  08/12/20 0142    Narrative:       The following orders were created for panel order COVID PRE-OP / PRE-PROCEDURE SCREENING ORDER (NO ISOLATION) - Swab, Nasopharynx.  Procedure                               Abnormality         Status                     ---------                               -----------         ------                     COVID-19, ABBOTT IN-HOUS...[[id number]]  Normal              Final result                 Please view results for these tests on the individual orders.    COVID-19, ABBOTT IN-HOUSE,NP Swab (NO TRANSPORT MEDIA) 2 HR TAT - Swab, Nasopharynx [[id number]]  (Normal) Collected:  08/12/20 0038    Lab Status:  Final result Specimen:  Swab from Nasopharynx Updated:  08/12/20 0142     COVID19 Not Detected    Narrative:       Fact sheet for providers: https://www.fda.gov/media/698225/download     Fact sheet for  patients: https://www.[employer].gov/media/894492/download          Imaging Results (Last 24 Hours)     Procedure Component Value Units Date/Time    CT Angiogram Chest With & Without Contrast [[id number]] Resulted:  08/13/20 0757     Updated:  08/13/20 0810    CT Head Without Contrast [[id number]] Resulted:  08/13/20 0757     Updated:  08/13/20 0810    XR Chest 1 View [[id number]] Collected:  08/13/20 0707     Updated:  08/13/20 0709    Narrative:       CR Chest 1 Vw    INDICATION:   Dyspnea     COMPARISON:    8/11/2020    FINDINGS:  Single portable AP view(s) of the chest.    Support lines and tubes are unchanged.    Improved lung volumes. Continued cardiomegaly with mild pulmonary vascular congestion. Diffuse interstitial changes may represent developing edema. Slight blunting of both CP angles may represent small effusions. No pneumothorax.      Impression:       Cardiomegaly with pulmonary venous congestion and diffuse interstitial changes may represent developing CHF. Questionable small bilateral effusions.    Signer Name: PHYLLIS Macias MD   Signed: 8/13/2020 7:07 AM   Workstation Name: [email or web address]    [facility]    XR Hip With or Without Pelvis 2 - 3 View Right [[id number]] Collected:  08/12/20 2009     Updated:  08/12/20 2011    Narrative:       CR Hip Uni Comp Min 2 Vws RT    INDICATION:   Status post right hip replacement    COMPARISON:   8/10/2020    FINDINGS:  AP and frog-leg lateral view(s) of the right hip. The right hip replacement has been placed. There is no fracture or dislocation    Signer Name: Kush Shelby MD   Signed: 8/12/2020 8:09 PM   Workstation [facility]: [facility]    [facility]               I have reviewed the medications:  Scheduled Meds:    aspirin 325 mg Oral Daily   atenolol 50 mg Oral Q24H   cefTRIAXone 1 g Intravenous Q24H   insulin lispro 0-7 Units Subcutaneous 4x Daily With Meals & Nightly   [COMPLETED] iopamidol 100 mL Intravenous Once in  imaging   sodium chloride 10 mL Intravenous Q12H     Continuous Infusions:    lactated ringers 9 mL/hr Last Rate: 9 mL/hr (08/12/20 1510)   sodium chloride 150 mL/hr Last Rate: 150 mL/hr (08/12/20 2112)     PRN Meds:.dextrose  •  dextrose  •  glucagon (human recombinant)  •  HYDROcodone-acetaminophen  •  lactated ringers  •  [MAR Hold] Morphine  •  Morphine  •  naloxone  •  promethazine  •  sodium chloride    Assessment/Plan   Assessment & Plan     Active Hospital Problems    Diagnosis  POA   • **Hip fracture (CMS/HCC) [S72.009A]  Yes     Priority: High   • Acute respiratory failure with hypoxia (CMS/HCC) [J96.01]  Unknown     Priority: High   • Status post hip hemiarthroplasty [Z96.649]  Not Applicable     Priority: High   • Altered mental status [R41.82]  Yes     Priority: High   • Acute UTI (urinary tract infection) [N39.0]  Yes     Priority: Medium   • Fall [W19.XXXA]  Unknown     Priority: Medium   • Atrial fibrillation (CMS/[facility]) [I48.91]  Yes     Priority: Medium   • Hyperlipidemia [E78.5]  Yes     Priority: Low   • Hypertension [I10]  Yes     Priority: Low   • CAD (coronary artery disease) [I25.10]  Yes     Priority: Low   • Iron deficiency anemia [D50.9]  Yes      Resolved Hospital Problems   No resolved problems to display.        Brief Hospital Course to date:  Analilia De Leon is a 85 y.o. female with h/o CAD, HTN, T2DM, HLD as well as pAF on xarelto who presented as a transfer from OSH due to acute right sided hip fracture. Post-op, pt had acute hypoxic respiratory failure initially requiring nonrebreather and pt had AMS that improved with IV narcan.     Plan:    Right hip fracture due to mechanical fall  --s/p OR with Dr. Lopez POD 1  -symptom mgt; PRN Morphine pain control  -case mgt consult   -CT head unremarkable  -COVID-19 pre-procedure swab NEGATIVE    Acute hypoxic respiratory failure  Acute PE  Flash pulmonary edema  --CXR appears to show edema.  CTA chest with possible PE. Also,  appears to have  "edema and bilateral (L>R) effusions per my personal evaluation  --proBNP 9,000 suspect flash pulm edema due to Afib with RVR  --will give one dose IV Lasix 40mg today, stop IVFs  --TTE to assess for Right heart strain as well as to assess LVEF.   --holding off on patient's Xarelto for now due to above, but will do heparin gtt in the meantime given presence of PE but risk of bleeding in post-op site.     Sepsis (tachycardic, leukocytosis)  --likely due to aspiration event vs UTI which was POA  --blood cultures from admission NGTD  --UCx as below  --continue IV Rocephin for now, got a dose of IV Vanc overnight, will continue this for now. If MRSA PCR NEGATIVE, will stop Vanc     Altered mental status  -per family pt became \"mildly\" confused after IV Narcotics given at OSH  - initial CT head unremarkable, repeat CT head PENDING read.   --ABG with only hypoxia  --improved after IV Narcan this am. Will use lowest dose possible of pain medication since this seems to be a recurring issue     Ecoli UTI  -per OSH UA (+nitrites, 2+ bacteria, WBC 30)  -started on IV Rocephin, Reviewed cultures from there, appears to be E coli which was pan-susceptible. D3 of ABX  -repeat blood and UA/urine cultures PENDING.  UCx here may be of little value due to antibiotic modification  -lactic and procal wnl     T2DM  -hem a1c 6.8%  -FSBG q ac/hs w/ LDSS  -hold routine metformin      PAF  -CHADSVASC 5  -on routine Xarelto; held for surgery (last dose Sunday, 8/9)   --continue rate controlling meds- on Atenolol at home per med rec  --start Dilt gtt as she is in Afib with RVR this am and unable to tolerate PO meds at the moment.       HTN/HLD  -IV Lopressor now for BP and HR control  --will resume home meds        DVT prophylaxis:  Will do Heparin gtt given new PE, holding Xarelto due to recent surgery (Ortho would like to wait on restarting PO anticoagulation until at least 8/14/20). If any bleeding, etc from surgical site, will need to d/c " Heparin gtt.          Disposition: I expect the patient to be discharged tbd    CODE STATUS:   Code Status and Medical Interventions:   Ordered at: 08/12/20 2020     Code Status:    CPR     Medical Interventions (Level of Support Prior to Arrest):    Full         Electronically signed by Becka Peres MD, 08/13/20, 08:32.

## 2020-08-14 PROBLEM — I26.99 ACUTE PULMONARY EMBOLISM (HCC): Status: ACTIVE | Noted: 2020-08-14

## 2020-08-14 PROBLEM — I07.1 TRICUSPID REGURGITATION: Status: ACTIVE | Noted: 2020-08-14

## 2020-08-14 PROBLEM — I27.20 PULMONARY HYPERTENSION (HCC): Status: ACTIVE | Noted: 2020-08-14

## 2020-08-14 LAB
BACTERIA SPEC AEROBE CULT: NO GROWTH
BASOPHILS # BLD AUTO: 0.05 10*3/MM3 (ref 0–0.2)
BASOPHILS NFR BLD AUTO: 0.3 % (ref 0–1.5)
DEPRECATED RDW RBC AUTO: 53 FL (ref 37–54)
EOSINOPHIL # BLD AUTO: 0.17 10*3/MM3 (ref 0–0.4)
EOSINOPHIL NFR BLD AUTO: 1 % (ref 0.3–6.2)
ERYTHROCYTE [DISTWIDTH] IN BLOOD BY AUTOMATED COUNT: 14.9 % (ref 12.3–15.4)
GLUCOSE BLDC GLUCOMTR-MCNC: 165 MG/DL (ref 70–130)
GLUCOSE BLDC GLUCOMTR-MCNC: 170 MG/DL (ref 70–130)
GLUCOSE BLDC GLUCOMTR-MCNC: 180 MG/DL (ref 70–130)
GLUCOSE BLDC GLUCOMTR-MCNC: 186 MG/DL (ref 70–130)
HCT VFR BLD AUTO: 25.4 % (ref 34–46.6)
HGB BLD-MCNC: 7.3 G/DL (ref 12–15.9)
IMM GRANULOCYTES # BLD AUTO: 0.13 10*3/MM3 (ref 0–0.05)
IMM GRANULOCYTES NFR BLD AUTO: 0.8 % (ref 0–0.5)
LYMPHOCYTES # BLD AUTO: 0.82 10*3/MM3 (ref 0.7–3.1)
LYMPHOCYTES NFR BLD AUTO: 5 % (ref 19.6–45.3)
MCH RBC QN AUTO: 27.9 PG (ref 26.6–33)
MCHC RBC AUTO-ENTMCNC: 28.7 G/DL (ref 31.5–35.7)
MCV RBC AUTO: 96.9 FL (ref 79–97)
MONOCYTES # BLD AUTO: 2.01 10*3/MM3 (ref 0.1–0.9)
MONOCYTES NFR BLD AUTO: 12.3 % (ref 5–12)
MRSA DNA SPEC QL NAA+PROBE: NEGATIVE
NEUTROPHILS NFR BLD AUTO: 13.11 10*3/MM3 (ref 1.7–7)
NEUTROPHILS NFR BLD AUTO: 80.6 % (ref 42.7–76)
NRBC BLD AUTO-RTO: 0 /100 WBC (ref 0–0.2)
PLATELET # BLD AUTO: 163 10*3/MM3 (ref 140–450)
PMV BLD AUTO: 11.3 FL (ref 6–12)
RBC # BLD AUTO: 2.62 10*6/MM3 (ref 3.77–5.28)
UFH PPP CHRO-ACNC: 0.59 IU/ML (ref 0.3–0.7)
UFH PPP CHRO-ACNC: 0.61 IU/ML (ref 0.3–0.7)
UFH PPP CHRO-ACNC: 0.67 IU/ML (ref 0.3–0.7)
VANCOMYCIN TROUGH SERPL-MCNC: 4.5 MCG/ML (ref 5–20)
WBC # BLD AUTO: 16.29 10*3/MM3 (ref 3.4–10.8)

## 2020-08-14 PROCEDURE — P9612 CATHETERIZE FOR URINE SPEC: HCPCS

## 2020-08-14 PROCEDURE — 99233 SBSQ HOSP IP/OBS HIGH 50: CPT | Performed by: INTERNAL MEDICINE

## 2020-08-14 PROCEDURE — 85520 HEPARIN ASSAY: CPT

## 2020-08-14 PROCEDURE — 63710000001 INSULIN LISPRO (HUMAN) PER 5 UNITS: Performed by: ORTHOPAEDIC SURGERY

## 2020-08-14 PROCEDURE — 99222 1ST HOSP IP/OBS MODERATE 55: CPT | Performed by: INTERNAL MEDICINE

## 2020-08-14 PROCEDURE — 25010000002 CEFTRIAXONE PER 250 MG: Performed by: ORTHOPAEDIC SURGERY

## 2020-08-14 PROCEDURE — 80202 ASSAY OF VANCOMYCIN: CPT

## 2020-08-14 PROCEDURE — 87641 MR-STAPH DNA AMP PROBE: CPT | Performed by: INTERNAL MEDICINE

## 2020-08-14 PROCEDURE — 97110 THERAPEUTIC EXERCISES: CPT

## 2020-08-14 PROCEDURE — 25010000002 HEPARIN (PORCINE) 25000-0.45 UT/250ML-% SOLUTION

## 2020-08-14 PROCEDURE — 82962 GLUCOSE BLOOD TEST: CPT

## 2020-08-14 PROCEDURE — 85025 COMPLETE CBC W/AUTO DIFF WBC: CPT

## 2020-08-14 RX ORDER — ACETAMINOPHEN 160 MG/5ML
650 SOLUTION ORAL EVERY 6 HOURS PRN
Status: DISCONTINUED | OUTPATIENT
Start: 2020-08-14 | End: 2020-08-19 | Stop reason: HOSPADM

## 2020-08-14 RX ORDER — ACETAMINOPHEN 325 MG/1
650 TABLET ORAL EVERY 6 HOURS PRN
Status: DISCONTINUED | OUTPATIENT
Start: 2020-08-14 | End: 2020-08-19 | Stop reason: HOSPADM

## 2020-08-14 RX ADMIN — INSULIN LISPRO 2 UNITS: 100 INJECTION, SOLUTION INTRAVENOUS; SUBCUTANEOUS at 20:25

## 2020-08-14 RX ADMIN — HEPARIN SODIUM 18 UNITS/KG/HR: 10000 INJECTION, SOLUTION INTRAVENOUS at 17:00

## 2020-08-14 RX ADMIN — CEFTRIAXONE SODIUM 1 G: 1 INJECTION, POWDER, FOR SOLUTION INTRAMUSCULAR; INTRAVENOUS at 21:57

## 2020-08-14 RX ADMIN — SODIUM CHLORIDE, PRESERVATIVE FREE 10 ML: 5 INJECTION INTRAVENOUS at 20:26

## 2020-08-14 RX ADMIN — INSULIN LISPRO 2 UNITS: 100 INJECTION, SOLUTION INTRAVENOUS; SUBCUTANEOUS at 17:01

## 2020-08-14 RX ADMIN — ATENOLOL 50 MG: 50 TABLET ORAL at 08:51

## 2020-08-14 RX ADMIN — INSULIN LISPRO 2 UNITS: 100 INJECTION, SOLUTION INTRAVENOUS; SUBCUTANEOUS at 08:51

## 2020-08-14 RX ADMIN — ACETAMINOPHEN ORAL SOLUTION 649.6 MG: 650 SOLUTION ORAL at 12:10

## 2020-08-14 RX ADMIN — MAGNESIUM HYDROXIDE 10 ML: 2400 SUSPENSION ORAL at 12:10

## 2020-08-14 RX ADMIN — INSULIN LISPRO 2 UNITS: 100 INJECTION, SOLUTION INTRAVENOUS; SUBCUTANEOUS at 12:10

## 2020-08-14 NOTE — THERAPY TREATMENT NOTE
"Patient Name: Analilia De Leon  : 1935    MRN: [id number]                              Today's Date: 2020       Admit Date: 2020    Visit Dx:     ICD-10-CM ICD-9-CM   1. Hyperkalemia E87.5 276.7     Patient Active Problem List   Diagnosis   • Iron deficiency anemia   • Hypertension   • Atrial fibrillation (CMS/HCC)   • Hyperlipidemia   • CAD (coronary artery disease)   • Hip fracture (CMS/HCC)   • Acute UTI (urinary tract infection)   • Altered mental status   • Fall   • Status post hip hemiarthroplasty   • Acute respiratory failure with hypoxia (CMS/HCC)   • Acute pulmonary embolism (CMS/HCC)   • Pulmonary hypertension (CMS/HCC)   • Tricuspid regurgitation     Past Medical History:   Diagnosis Date   • Ataxia 2017   • Atrial fibrillation (CMS/HCC)    • BPPV (benign paroxysmal positional vertigo) 2017   • Chronic anticoagulation    • Facial numbness     takes Keppra for \"facial numbness\"    • History of blood transfusion    • Hypertension    • Microangiopathy (CMS/HCC) 2017   • T2DM (type 2 diabetes mellitus) (CMS/HCC)    • TIA (transient ischemic attack) 3/17/2017   • Weakness 2017     Past Surgical History:   Procedure Laterality Date   • CHOLECYSTECTOMY     • ENDOSCOPY  2015    showed 2 ulcerated lesions in the gastric antrum   • HIP HEMIARTHROPLASTY Right 2020    Procedure: HIP BI-POLAR RIGHT;  Surgeon: Ramirez Lopez MD;  Location: [facility];  Service: Orthopedics;  Laterality: Right;   • HYSTERECTOMY     • KNEE SURGERY Right    • OOPHORECTOMY       General Information     Row Name 20 1436          PT Evaluation Time/Intention    Document Type  therapy note (daily note)  -SC     Mode of Treatment  physical therapy  -SC     Row Name 20 1436          General Information    Patient Profile Reviewed?  yes  -SC     Existing Precautions/Restrictions  fall;right;hip, posterior  -SC     Row Name 20 1436          Cognitive Assessment/Intervention- PT/OT    " Orientation Status (Cognition)  oriented to;person  -SC     Cognitive Assessment/Intervention Comment  lethergic, opens eyes and talks slowly with stimulation. Able to reconize her daughter. WIll track to right but seems to have a L side gaze preferance, smile symetrical, tongue midline.  -SC     Row Name 08/14/20 1436          Safety Issues, Functional Mobility    Safety Issues Affecting Function (Mobility)  insight into deficits/self awareness;judgment;sequencing abilities  -SC     Impairments Affecting Function (Mobility)  strength;endurance/activity tolerance;motor control;range of motion (ROM);cognition  -SC       User Key  (r) = Recorded By, (t) = Taken By, (c) = Cosigned By    Initials Name Provider Type    SC Maykel Matthew, PT Physical Therapist        Mobility     Row Name 08/14/20 1448          Bed Mobility Assessment/Treatment    Comment (Bed Mobility)  all bed mobility limited by weakness. RN request NO OOB .  -[facility] Name 08/14/20 1448          Transfer Assessment/Treatment    Comment (Transfers)  NO OOB per RN  -[facility] Name 08/14/20 1448          Gait/Stairs Assessment/Training    Comment (Gait/Stairs)  no OOB  -[facility] Name 08/14/20 1448          Mobility Assessment/Intervention    Extremity Weight-bearing Status  right lower extremity  -SC     Right Lower Extremity (Weight-bearing Status)  weight-bearing as tolerated (WBAT)  -SC       User Key  (r) = Recorded By, (t) = Taken By, (c) = Cosigned By    Initials Name Provider Type    SC Maykel Matthew, PT Physical Therapist        Obj/Interventions     [facility] Name 08/14/20 1449          Therapeutic Exercise    Upper Extremity Range of Motion (Therapeutic Exercise)  shoulder flexion/extension, bilateral;shoulder abduction/adduction, bilateral  -SC     Lower Extremity (Therapeutic Exercise)  heel slides, bilateral;SLR (straight leg raise), bilateral;quad sets, bilateral  -SC     Lower Extremity Range of Motion (Therapeutic Exercise)  hip  abduction/adduction, bilateral  -SC     Exercise Type (Therapeutic Exercise)  AAROM (active assistive range of motion);isotonic contraction, concentric  -SC     Sets/Reps (Therapeutic Exercise)  5-10  -SC     Expected Outcome (Therapeutic Exercise)  facilitate normal movement patterns  -SC     Comment (Therapeutic Exercise)  cues for technique- all active movements slow   -SC       User Key  (r) = Recorded By, (t) = Taken By, (c) = Cosigned By    Initials Name Provider Type    SC Maykel Matthew, PT Physical Therapist        Goals/Plan    No documentation.       Clinical Impression     [facility] Name 08/14/20 1451          Pain Assessment    Additional Documentation  Pain Scale: Numbers Pre/Post-Treatment (Group)  -SC     Row Name 08/14/20 [facility]          Pain Scale: Numbers Pre/Post-Treatment    Pain Scale: Numbers, Pretreatment  0/10 - no pain  -SC     Pain Scale: Numbers, Post-Treatment  0/10 - no pain  -[facility] Name 08/14/20 [facility]          Plan of Care Review    Plan of Care Reviewed With  patient  -SC     Progress  declining  -SC     Outcome Summary  Patient is oriented to her name and knows her daughter. She was able to participate in some bed exercises. She stayed in bed today because of sever weakness.  She seeems to have al Left side gaze preferance.   -SC     Row Name 08/14/20 [facility]          Physical Therapy Clinical Impression    Criteria for Skilled Interventions Met (PT Clinical Impression)  yes;treatment indicated  -SC     Rehab Potential (PT Clinical Summary)  fair, will monitor progress closely  -[facility] Name 08/14/20 1451          Vital Signs    Post Systolic BP Rehab  155  -SC     Post Treatment Diastolic BP  75  -SC     Posttreatment Heart Rate (beats/min)  801  -SC     Row Name 08/14/20 1451          Positioning and Restraints    Pre-Treatment Position  in bed  -SC     Post Treatment Position  bed  -SC     In Bed  notified nsg;supine;encouraged to call for assist;with family/caregiver  -SC       User  Key  (r) = Recorded By, (t) = Taken By, (c) = Cosigned By    Initials Name Provider Type    Maykel Chaudhry PT Physical Therapist        Outcome Measures     Row Name 08/14/20 1458          How much help from another person do you currently need...    Turning from your back to your side while in flat bed without using bedrails?  1  -SC     Moving from lying on back to sitting on the side of a flat bed without bedrails?  1  -SC     Moving to and from a bed to a chair (including a wheelchair)?  1  -SC     Standing up from a chair using your arms (e.g., wheelchair, bedside chair)?  1  -SC     Climbing 3-5 steps with a railing?  1  -SC     To walk in hospital room?  1  -SC     AM-PAC 6 Clicks Score (PT)  6  -SC     Row Name 08/14/20 1458          Functional Assessment    Outcome Measure Options  AM-PAC 6 Clicks Basic Mobility (PT)  -SC       User Key  (r) = Recorded By, (t) = Taken By, (c) = Cosigned By    Initials Name Provider Type    Maykel Chaudhry PT Physical Therapist        Physical Therapy Education                 Title: PT OT SLP Therapies (In Progress)     Topic: Physical Therapy (In Progress)     Point: Mobility training (In Progress)     Description:   Instruct learner(s) on safety and technique for assisting patient out of bed, chair or wheelchair.  Instruct in the proper use of assistive devices, such as walker, crutches, cane or brace.              Patient Friendly Description:   It's important to get you on your feet again, but we need to do so in a way that is safe for you. Falling has serious consequences, and your personal safety is the most important thing of all.        When it's time to get out of bed, one of us or a family member will sit next to you on the bed to give you support.     If your doctor or nurse tells you to use a walker, crutches, a cane, or a brace, be sure you use it every time you get out of bed, even if you think you don't need it.    Learning Progress Summary            Patient Acceptance, E, NR by SC at 8/14/2020 1458    Comment:  reviewed HEP    Acceptance, E,D, VU,NR by LR at 8/13/2020 1528    Comment:  Educated on posterior hip precautions, weight bearing status, correct supine to sit t/f technique, correct sit<->stand t/f technique, safety with mobility, and progression of POC.   Family Acceptance, E, NR by SC at 8/14/2020 1458    Comment:  reviewed HEP    Acceptance, E,D, VU,NR by LR at 8/13/2020 1528    Comment:  Educated on posterior hip precautions, weight bearing status, correct supine to sit t/f technique, correct sit<->stand t/f technique, safety with mobility, and progression of POC.                   Point: Home exercise program (In Progress)     Description:   Instruct learner(s) on appropriate technique for monitoring, assisting and/or progressing patient with therapeutic exercises and activities.              Learning Progress Summary           Patient Acceptance, E, NR by SC at 8/14/2020 1458    Comment:  reviewed HEP    Acceptance, E,D, VU,NR by LR at 8/13/2020 1528    Comment:  Educated on posterior hip precautions, weight bearing status, correct supine to sit t/f technique, correct sit<->stand t/f technique, safety with mobility, and progression of POC.   Family Acceptance, E, NR by SC at 8/14/2020 1458    Comment:  reviewed HEP    Acceptance, E,D, VU,NR by LR at 8/13/2020 1528    Comment:  Educated on posterior hip precautions, weight bearing status, correct supine to sit t/f technique, correct sit<->stand t/f technique, safety with mobility, and progression of POC.                   Point: Body mechanics (In Progress)     Description:   Instruct learner(s) on proper positioning and spine alignment for patient and/or caregiver during mobility tasks and/or exercises.              Learning Progress Summary           Patient Acceptance, E, NR by SC at 8/14/2020 1458    Comment:  reviewed HEP    Acceptance, E,D, VU,NR by LR at 8/13/2020 1528    Comment:   Educated on posterior hip precautions, weight bearing status, correct supine to sit t/f technique, correct sit<->stand t/f technique, safety with mobility, and progression of POC.   Family Acceptance, E, NR by SC at 8/14/2020 1458    Comment:  reviewed HEP    Acceptance, E,D, VU,NR by LR at 8/13/2020 1528    Comment:  Educated on posterior hip precautions, weight bearing status, correct supine to sit t/f technique, correct sit<->stand t/f technique, safety with mobility, and progression of POC.                   Point: Precautions (In Progress)     Description:   Instruct learner(s) on prescribed precautions during mobility and gait tasks              Learning Progress Summary           Patient Acceptance, E, NR by SC at 8/14/2020 1458    Comment:  reviewed HEP    Acceptance, E,D, VU,NR by LR at 8/13/2020 1528    Comment:  Educated on posterior hip precautions, weight bearing status, correct supine to sit t/f technique, correct sit<->stand t/f technique, safety with mobility, and progression of POC.   Family Acceptance, E, NR by SC at 8/14/2020 1458    Comment:  reviewed HEP    Acceptance, E,D, VU,NR by LR at 8/13/2020 1528    Comment:  Educated on posterior hip precautions, weight bearing status, correct supine to sit t/f technique, correct sit<->stand t/f technique, safety with mobility, and progression of POC.                               User Key     Initials Effective Dates Name Provider Type Discipline    SC 06/19/15 -  Maykel Matthew, PT Physical Therapist PT     06/19/15 -  Natasha Monroe, PT Physical Therapist PT              PT Recommendation and Plan     Outcome Summary/Treatment Plan (PT)  Anticipated Discharge Disposition (PT): skilled nursing facility  Plan of Care Reviewed With: patient  Progress: declining  Outcome Summary: Patient is oriented to her name and knows her daughter. She was able to participate in some bed exercises. She stayed in bed today because of sever weakness.  She  seeems to have al Left side gaze preferance.      Time Calculation:   PT Charges     Row Name 08/14/20 1413             Time Calculation    Start Time  1413  -SC      PT Received On  08/14/20  -SC      PT Goal Re-Cert Due Date  08/23/20  -SC         Time Calculation- PT    Total Timed Code Minutes- PT  15 minute(s)  -SC         Timed Charges    76614 - PT Therapeutic Exercise Minutes  15  -SC        User Key  (r) = Recorded By, (t) = Taken By, (c) = Cosigned By    Initials Name Provider Type    SC Maykel Matthew PT Physical Therapist        Therapy Charges for Today     Code Description Service Date Service Provider Modifiers Qty    33640192717 HC PT THER PROC EA 15 MIN 8/14/2020 Maykel Matthew PT GP 1          PT G-Codes  Outcome Measure Options: AM-PAC 6 Clicks Basic Mobility (PT)  AM-PAC 6 Clicks Score (PT): 6    Maykel Matthew PT  8/14/2020

## 2020-08-14 NOTE — CONSULTS
"[facility] at [facility]  CARDIOLOGY CONSULTATION NOTE      Date of  Admission: 8/11/2020  7:25 PM     Consulting physician:Dr. SHERMAN Peres.   Cardiologist: Dr. Toribio.   CC/Reason for consult: Pulmonary HTN    HPI:  85-year-old female with apparent past medical history of AFib  , hypertension,  Diabetes,  dyslipidemia peripheral neuropathy and atrial fibrillation transferred from outside hospital ([facility]) after mechanical fall and acute hip fracture.  The patient is confused and disoriented and there is no one to provide appropriate history.  The patient underwent hip surgery August 2012 postoperatively she is developed acute hypoxia respiratory failure and confusion.  CT scan suggested a PE.  In addition echocardiogram revealed severe pulmonary hypertension and TR.  She is requiring 4 L of oxygen per nasal cannula.  Cardiology has been consult consulted regarding her abnormal echocardiogram revaling pulmonary hypertension and atrial fibrillation. She follows with Dr Toribio in [place] for her care. Currently she is resting appears comfortable with no chest pain. HR now controlled in permanent Afib.       Patient Active Problem List   Diagnosis   • Iron deficiency anemia   • Hypertension   • Atrial fibrillation (CMS/HCC)   • Hyperlipidemia   • CAD (coronary artery disease)   • Hip fracture (CMS/HCC)   • Acute UTI (urinary tract infection)   • Altered mental status   • Fall   • Status post hip hemiarthroplasty   • Acute respiratory failure with hypoxia (CMS/HCC)   • Acute pulmonary embolism (CMS/HCC)   • Pulmonary hypertension (CMS/HCC)   • Tricuspid regurgitation       Past Medical History:   Diagnosis Date   • Ataxia 6/6/2017   • Atrial fibrillation (CMS/HCC)    • BPPV (benign paroxysmal positional vertigo) 6/6/2017   • Chronic anticoagulation    • Facial numbness     takes Keppra for \"facial numbness\"    • History of blood transfusion    • Hypertension    • " Microangiopathy (CMS/HCC) 6/6/2017   • T2DM (type 2 diabetes mellitus) (CMS/HCC)    • TIA (transient ischemic attack) 3/17/2017   • Weakness 6/6/2017      Past Surgical History:   Procedure Laterality Date   • CHOLECYSTECTOMY     • ENDOSCOPY  04/06/2015    showed 2 ulcerated lesions in the gastric antrum   • HIP HEMIARTHROPLASTY Right 8/12/2020    Procedure: HIP BI-POLAR RIGHT;  Surgeon: Ramirez Lopez MD;  Location: [facility];  Service: Orthopedics;  Laterality: Right;   • HYSTERECTOMY     • KNEE SURGERY Right    • OOPHORECTOMY       Allergies   Allergen Reactions   • Amoxicillin    • Crestor [Rosuvastatin]    • Eliquis [Apixaban]    • Keflex [Cephalexin]    • Motrin [Ibuprofen]    • Tramadol Confusion   • Vasotec [Enalapril Maleate]       Family History   Problem Relation Age of Onset   • Breast cancer Sister    • Breast cancer Maternal Aunt    • No Known Problems Mother           Current Facility-Administered Medications:   •  !Vancomycin Level Due 8/14 @ 1530, Please Hold Dose Due At 1600 Untill Pharmacy Reviews Level , , Does not apply, Once, Vinay Johnson, [facility]  •  atenolol (TENORMIN) tablet 50 mg, 50 mg, Oral, Q24H, Ramirez Lopez MD, 50 mg at 08/14/20 0851  •  bisacodyl (DULCOLAX) suppository 10 mg, 10 mg, Rectal, Daily PRN, Becka Peres MD  •  cefTRIAXone (ROCEPHIN) 1 g/100 mL 0.9% NS (MBP), 1 g, Intravenous, Q24H, Ramirez Lopez MD, 1 g at 08/13/20 2013  •  dextrose (D50W) 25 g/ 50mL Intravenous Solution 25 g, 25 g, Intravenous, Q15 Min PRN, Ramirez Lopez MD  •  dextrose (GLUTOSE) oral gel 15 g, 15 g, Oral, Q15 Min PRN, Ramirez Lopez MD  •  dilTIAZem (CARDIZEM) 125 mg in 125 mL 0.7% sodium chloride (1 mg/mL) infusion, 5-15 mg/hr, Intravenous, Titrated, Becka Peres MD, Stopped at 08/13/20 1700  •  glucagon (human recombinant) (GLUCAGEN DIAGNOSTIC) injection 1 mg, 1 mg, Subcutaneous, Q15 Min PRN, Ramirez Lopez MD  •  heparin 57491 units/250 mL (100 units/mL) in 0.45 % NaCl  infusion, 18 Units/kg/hr, Intravenous, Titrated, Anastacio Escoto PharmD, Last Rate: 10.62 mL/hr at 08/13/20 2015, 18 Units/kg/hr at 08/13/20 2015  •  HYDROcodone-acetaminophen (NORCO) 5-325 MG per tablet 1 tablet, 1 tablet, Oral, Q4H PRN, Ramirez Lopez MD  •  insulin lispro (humaLOG) injection 0-7 Units, 0-7 Units, Subcutaneous, 4x Daily With Meals & Nightly, Ramirez Lopez MD, 2 Units at 08/14/20 0851  •  lactated ringers infusion, 9 mL/hr, Intravenous, Continuous PRN, Ramirez Lopez MD, Last Rate: 9 mL/hr at 08/12/20 1510  •  Morphine sulfate (PF) injection 3 mg, 3 mg, Intravenous, Q4H PRN, Ramirez Lopez MD, 3 mg at 08/13/20 0515  •  naloxone (NARCAN) injection 0.2 mg, 0.2 mg, Intravenous, PRN, Ramirez Lopez MD, 0.2 mg at 08/13/20 0818  •  Pharmacy to Dose Heparin, , Does not apply, Continuous PRN, Becka Peres MD  •  promethazine (PHENERGAN) tablet 12.5 mg, 12.5 mg, Oral, Q6H PRN, Ramirez Lopez MD  •  sodium chloride 0.9 % flush 10 mL, 10 mL, Intravenous, Q12H, Ramirez Lopez MD, 10 mL at 08/12/20 0858  •  sodium chloride 0.9 % flush 10 mL, 10 mL, Intravenous, PRN, Ramirez Lopez MD  •  sodium zirconium cyclosilicate (LOKELMA) pack 10 g, 10 g, Oral, Once, Becka Peres MD  •  vancomycin in dextrose 5% 150 mL (VANCOCIN) IVPB 750 mg, 750 mg, Intravenous, Q24H, Vinay Johnson, RPH, 750 mg at 08/13/20 1627        Physical Exam - Vitals  Vitals:    08/13/20 1914 08/14/20 0336 08/14/20 0820 08/14/20 0851   BP: 154/76 161/99  160/76   BP Location: Right arm Right arm     Patient Position: Sitting Lying     Pulse: 80 109  95   Resp: 16 16     Temp: 99.1 °F (37.3 °C) 98 °F (36.7 °C) 97.9 °F (36.6 °C)    TempSrc: Axillary Axillary Axillary    SpO2: 95% 98%     Weight:       Height:           Physical Exam:  General-Confusion, sob.   Eyes - PERRLA  Neck- supple, No mass  CV- IRIR  Lung- Decreased BS bases  Abd- soft, +BS  Skin- warm and dry  Neuro -Confusion.       Cardiographics  (I have  "reviewed the EKG/Tracing from today and listed the findings below)    Telemetry:   ECG:  AFib with RVR.         Labs/Diagnostic Data  Results from last 7 days   Lab Units 08/13/20  0507 08/12/20  0344 08/11/20 2028   SODIUM mmol/L 137 140 138   POTASSIUM mmol/L 5.5* 5.0 5.6*   CHLORIDE mmol/L 109* 106 106   CO2 mmol/L 19.0* 24.0 21.0*   BUN mg/dL 21 18 13   CREATININE mg/dL 1.03* 0.90 0.99   GLUCOSE mg/dL 197* 150* 178*   CALCIUM mg/dL 8.6 8.9 9.2     Results from last 7 days   Lab Units 08/13/20  0507 08/11/20 2028   TROPONIN T ng/mL <0.010 <0.010     Results from last 7 days   Lab Units 08/14/20  0154 08/13/20  1241 08/13/20  0507   WBC 10*3/mm3 16.29* 17.02* 21.87*   HEMOGLOBIN g/dL 7.3* 8.1* 8.6*   HEMATOCRIT % 25.4* 27.1* 28.0*   PLATELETS 10*3/mm3 163 159 243     Results from last 7 days   Lab Units 08/11/20 2028   MAGNESIUM mg/dL 1.6         Results from last 7 days   Lab Units 08/12/20  0344   TSH uIU/mL 3.970     Results from last 7 days   Lab Units 08/12/20  0344   HEMOGLOBIN A1C % 6.80*     Results from last 7 days   Lab Units 08/13/20  0507   PROBNP pg/mL 9,136.0*     Results from last 7 days   Lab Units 08/13/20  1241 08/11/20 2028   PROTIME Seconds 17.6* 15.7*   INR  1.48* 1.28*   APTT seconds 32.4* 36.7*           Assessment:    1. VHD/Severe Pulmonary HTN, Normal EF -likely chronic in nature with recent acute worsening secondary to PE.  2. Acute PE by CTA 8/13/20, Respiratory failure, \"flash\" pulmonary edema   3. Permanent Afib with RVR in setting of PE  4. Sepsis, UTI Hospitalist following   5. AMS, Hospitalist following  6. Severe Anemia  2. Hip surgery 8/12/2020 s/p Mechanical fall from OSH([facility])       Plan:   1. Rate control Afib ,Cardizem GTT as needed for Afib rate control.  2. Trending volume status may need further Diuresis   2. Continue anticoagulation, Heparin gtt., transition to NOAC once anemia stabilized.  3. Patient not a candidate for EKOS    Thank you very much for this referral. " We appreciate the opportunity to participate in this patient's care. We will follow along with above stated plan.    Will Delmi OVIEDO scribe for   Cardiology/Electrophysiology

## 2020-08-14 NOTE — PLAN OF CARE
Patient disoriented x4. Pt will open eyes but does not follow commands or answer questions, will mumble a few words. Pt sleeping between care rounds. I&O cath 475ml. VSS.

## 2020-08-14 NOTE — PROGRESS NOTES
Continued Stay Note  [facility]     Patient Name: Analilia De Leon  MRN: [id number]  Today's Date: 8/14/2020    Admit Date: 8/11/2020    Discharge Plan     Row Name 08/14/20 1458       Plan    Plan  skilled rehab    Plan Comments  Case mgt f/u. d/c plan is to go to short term skilled rehab. Referrals has ve been made to [facility] and [place] ( [facility]) would be back up plan if [facility] is full. Her iinsurance will require pre authorization and this will not be initiated until  early next week when she can participate with therapy. and is medically stable for transfer        Discharge Codes    No documentation.       Expected Discharge Date and Time     Expected Discharge Date Expected Discharge Time    Aug 17, 2020             Sonja C Kellerman, RN

## 2020-08-14 NOTE — PROGRESS NOTES
"Analilia CARCAMO [facility]  [id number]  1935    /76   Pulse 95   Temp 97.9 °F (36.6 °C) (Axillary)   Resp 16   Ht 154.9 cm (61\")   Wt 59 kg (130 lb)   SpO2 98%   BMI 24.56 kg/m²     Lab Results (last 24 hours)     Procedure Component Value Units Date/Time    POC Glucose Once [[id number]]  (Abnormal) Collected:  08/14/20 1153    Specimen:  Blood Updated:  08/14/20 1205     Glucose 180 mg/dL     MRSA Screen, PCR (Inpatient) - Swab, Nares [[id number]] Collected:  08/14/20 1117    Specimen:  Swab from Nares Updated:  08/14/20 1124    Heparin Anti-Xa [[id number]]  (Normal) Collected:  08/14/20 0750    Specimen:  Blood Updated:  08/14/20 0831     Heparin Anti-Xa (UFH) 0.67 IU/ml     POC Glucose Once [[id number]]  (Abnormal) Collected:  08/14/20 0823    Specimen:  Blood Updated:  08/14/20 0825     Glucose 186 mg/dL     Blood Culture - Blood, Hand, Left [[id number]] Collected:  08/11/20 2054    Specimen:  Blood from Hand, Left Updated:  08/14/20 0815     Blood Culture No growth at 2 days    Urine Culture - Urine, Urine, Catheter In/Out [[id number]]  (Normal) Collected:  08/13/20 0627    Specimen:  Urine, Catheter In/Out Updated:  08/14/20 0725     Urine Culture No growth    Heparin Anti-Xa [[id number]]  (Normal) Collected:  08/14/20 0154    Specimen:  Blood Updated:  08/14/20 0246     Heparin Anti-Xa (UFH) 0.61 IU/ml     CBC & Differential [[id number]] Collected:  08/14/20 0154    Specimen:  Blood Updated:  08/14/20 0219    Narrative:       The following orders were created for panel order CBC & Differential.  Procedure                               Abnormality         Status                     ---------                               -----------         ------                     CBC Auto Differential[[id number]]        Abnormal            Final result                 Please view results for these tests on the individual orders.    CBC Auto Differential [[id number]]  (Abnormal) Collected:  08/14/20 0154    Specimen:  Blood " Updated:  08/14/20 0219     WBC 16.29 10*3/mm3      RBC 2.62 10*6/mm3      Hemoglobin 7.3 g/dL      Hematocrit 25.4 %      MCV 96.9 fL      MCH 27.9 pg      MCHC 28.7 g/dL      RDW 14.9 %      RDW-SD 53.0 fl      MPV 11.3 fL      Platelets 163 10*3/mm3      Neutrophil % 80.6 %      Lymphocyte % 5.0 %      Monocyte % 12.3 %      Eosinophil % 1.0 %      Basophil % 0.3 %      Immature Grans % 0.8 %      Neutrophils, Absolute 13.11 10*3/mm3      Lymphocytes, Absolute 0.82 10*3/mm3      Monocytes, Absolute 2.01 10*3/mm3      Eosinophils, Absolute 0.17 10*3/mm3      Basophils, Absolute 0.05 10*3/mm3      Immature Grans, Absolute 0.13 10*3/mm3      nRBC 0.0 /100 WBC     Blood Culture - Blood, Hand, Left [[id number]] Collected:  08/12/20 0013    Specimen:  Blood from Hand, Left Updated:  08/14/20 0015     Blood Culture No growth at 2 days    POC Glucose Once [[id number]]  (Abnormal) Collected:  08/13/20 2048    Specimen:  Blood Updated:  08/13/20 2049     Glucose 177 mg/dL     Heparin Anti-Xa [[id number]]  (Abnormal) Collected:  08/13/20 1818    Specimen:  Blood Updated:  08/13/20 1931     Heparin Anti-Xa (UFH) 0.10 IU/ml     POC Glucose Once [[id number]]  (Abnormal) Collected:  08/13/20 1610    Specimen:  Blood Updated:  08/13/20 1611     Glucose 198 mg/dL     Heparin Anti-Xa [[id number]]  (Abnormal) Collected:  08/13/20 1241    Specimen:  Blood Updated:  08/13/20 1316     Heparin Anti-Xa (UFH) 0.10 IU/ml     Protime-INR [[id number]]  (Abnormal) Collected:  08/13/20 1241    Specimen:  Blood Updated:  08/13/20 1315     Protime 17.6 Seconds      INR 1.48    aPTT [[id number]]  (Abnormal) Collected:  08/13/20 1241    Specimen:  Blood Updated:  08/13/20 1315     PTT 32.4 seconds     Narrative:       PTT = The equivalent PTT values for the therapeutic range of heparin levels at 0.3 to 0.5 U/ml are 55 to 70 seconds.    Urine Culture - Urine, Urine, Catheter In/Out [[id number]]  (Normal) Collected:  08/12/20 0138    Specimen:  Urine,  Catheter In/Out Updated:  08/13/20 1309     Urine Culture No growth    CBC & Differential [[id number]] Collected:  08/13/20 1241    Specimen:  Blood Updated:  08/13/20 1252    Narrative:       The following orders were created for panel order CBC & Differential.  Procedure                               Abnormality         Status                     ---------                               -----------         ------                     CBC Auto Differential[[id number]]        Abnormal            Final result                 Please view results for these tests on the individual orders.    CBC Auto Differential [[id number]]  (Abnormal) Collected:  08/13/20 1241    Specimen:  Blood Updated:  08/13/20 1252     WBC 17.02 10*3/mm3      RBC 2.77 10*6/mm3      Hemoglobin 8.1 g/dL      Hematocrit 27.1 %      MCV 97.8 fL      MCH 29.2 pg      MCHC 29.9 g/dL      RDW 14.9 %      RDW-SD 53.6 fl      MPV 10.7 fL      Platelets 159 10*3/mm3      Neutrophil % 86.6 %      Lymphocyte % 3.2 %      Monocyte % 9.3 %      Eosinophil % 0.2 %      Basophil % 0.2 %      Immature Grans % 0.5 %      Neutrophils, Absolute 14.72 10*3/mm3      Lymphocytes, Absolute 0.55 10*3/mm3      Monocytes, Absolute 1.58 10*3/mm3      Eosinophils, Absolute 0.04 10*3/mm3      Basophils, Absolute 0.04 10*3/mm3      Immature Grans, Absolute 0.09 10*3/mm3      nRBC 0.0 /100 WBC           Patient Care Team:  Sravan Franco APRN as PCP - General (Family Medicine)    SUBJECTIVE  Pain seems well controlled.  Physical therapy limited by sleepiness    PHYSICAL EXAM  Minimal thigh swelling  Neurovascularly intact to knees and toes       Hip fracture (CMS/HCC)    Iron deficiency anemia    Hypertension    Atrial fibrillation (CMS/HCC)    Hyperlipidemia    CAD (coronary artery disease)    Acute UTI (urinary tract infection)    Altered mental status    Fall    Status post hip hemiarthroplasty    Acute respiratory failure with hypoxia (CMS/HCC)    Acute pulmonary  embolism (CMS/HCC)    Pulmonary hypertension (CMS/HCC)    Tricuspid regurgitation      PLAN / DISPOSITION:  Hemoglobin 7.3 today.  Consider transfusion.  Discharge to [facility] (family preference) if possible.  Could transfer at any point if medically cleared.    Ramirez Lopez MD  08/14/20  12:46

## 2020-08-14 NOTE — PLAN OF CARE
Problem: Patient Care Overview  Goal: Plan of Care Review  Flowsheets  Taken 8/14/2020 7521  Progress: declining  Plan of Care Reviewed With: patient  Taken 8/14/2020 1910  Outcome Summary: Patient is oriented to her name and knows her daughter. She was able to participate in some bed exercises. She stayed in bed today because of sever weakness.  She seems to have al Left side gaze preference.

## 2020-08-14 NOTE — PROGRESS NOTES
[facility] Medicine Services  PROGRESS NOTE    Patient Name: Analilia De Loen  : 1935  MRN: [id number]    Date of Admission: 2020  Primary Care Physician: Sravan Franco APRN    Subjective   Subjective     CC:  Right hip fracture    HPI:  Pt still not at baseline mental status wise but improving.  Daughter at bedside this am.     Review of Systems  Gen- No fevers, chills  CV- No chest pain, palpitations  Resp- No cough, dyspnea  GI- No N/V/D, abd pain    Objective   Objective     Vital Signs:   Temp:  [97.9 °F (36.6 °C)-99.1 °F (37.3 °C)] 97.9 °F (36.6 °C)  Heart Rate:  [] 95  Resp:  [16-18] 16  BP: (102-169)/(47-99) 160/76        Physical Exam:  Constitutional: No acute distress, awake but minimally interactive this am  HENT: NCAT, mucous membranes moist  Respiratory: Clear to auscultation bilaterally, respiratory effort normal   Cardiovascular: irregularly irregular with rates in the mid 80s, appears afib on tele,  no murmurs, rubs, or gallops, palpable pedal pulses bilaterally  Gastrointestinal: Positive bowel sounds, soft, nontender, nondistended  Musculoskeletal: No bilateral ankle edema, RLE wound site c/d/i  Psychiatric: Appropriate affect, cooperative  Neurologic: alert, unable to answer questions appropriately this am  Skin: No rashes    Results Reviewed:  Results from last 7 days   Lab Units 20  0154 20  1241 20  0507  20   WBC 10*3/mm3 16.29* 17.02* 21.87*   < >  --    HEMOGLOBIN g/dL 7.3* 8.1* 8.6*   < >  --    HEMATOCRIT % 25.4* 27.1* 28.0*   < >  --    PLATELETS 10*3/mm3 163 159 243   < >  --    INR   --  1.48*  --   --  1.28*   PROCALCITONIN ng/mL  --   --   --   --  0.07    < > = values in this interval not displayed.     Results from last 7 days   Lab Units 20  0507 20  0344 20   SODIUM mmol/L 137 140 138   POTASSIUM mmol/L 5.5* 5.0 5.6*   CHLORIDE mmol/L 109* 106 106   CO2 mmol/L 19.0* 24.0 21.0*      BUN mg/dL 21 18 13   CREATININE mg/dL 1.03* 0.90 0.99   GLUCOSE mg/dL 197* 150* 178*   CALCIUM mg/dL 8.6 8.9 9.2   ALT (SGPT) U/L  --   --  15   AST (SGOT) U/L  --   --  23   TROPONIN T ng/mL <0.010  --  <0.010   PROBNP pg/mL 9,136.0*  --   --      Estimated Creatinine Clearance: 33 mL/min (A) (by C-G formula based on SCr of 1.03 mg/dL (H)).    Microbiology Results Abnormal     Procedure Component Value - Date/Time    Blood Culture - Blood, Hand, Left [[id number]] Collected:  08/11/20 2054    Lab Status:  Preliminary result Specimen:  Blood from Hand, Left Updated:  08/14/20 0815     Blood Culture No growth at 2 days    Urine Culture - Urine, Urine, Catheter In/Out [[id number]]  (Normal) Collected:  08/13/20 0627    Lab Status:  Final result Specimen:  Urine, Catheter In/Out Updated:  08/14/20 0725     Urine Culture No growth    Blood Culture - Blood, Hand, Left [[id number]] Collected:  08/12/20 0013    Lab Status:  Preliminary result Specimen:  Blood from Hand, Left Updated:  08/14/20 0015     Blood Culture No growth at 2 days    Urine Culture - Urine, Urine, Catheter In/Out [[id number]]  (Normal) Collected:  08/12/20 0138    Lab Status:  Final result Specimen:  Urine, Catheter In/Out Updated:  08/13/20 1309     Urine Culture No growth    COVID PRE-OP / PRE-PROCEDURE SCREENING ORDER (NO ISOLATION) - Swab, Nasopharynx [[id number]] Collected:  08/12/20 0038    Lab Status:  Final result Specimen:  Swab from Nasopharynx Updated:  08/12/20 0142    Narrative:       The following orders were created for panel order COVID PRE-OP / PRE-PROCEDURE SCREENING ORDER (NO ISOLATION) - Swab, Nasopharynx.  Procedure                               Abnormality         Status                     ---------                               -----------         ------                     COVID-19, ABBOTT IN-HOUS...[[id number]]  Normal              Final result                 Please view results for these tests on the individual orders.     COVID-19, ABBOTT IN-HOUSE,NP Swab (NO TRANSPORT MEDIA) 2 HR TAT - Swab, Nasopharynx [[id number]]  (Normal) Collected:  08/12/20 0038    Lab Status:  Final result Specimen:  Swab from Nasopharynx Updated:  08/12/20 0142     COVID19 Not Detected    Narrative:       Fact sheet for providers: https://www.fda.gov/media/517969/download     Fact sheet for patients: https://www.fda.gov/media/935030/download          Imaging Results (Last 24 Hours)     Procedure Component Value Units Date/Time    CT Angiogram Chest With & Without Contrast [[id number]] Collected:  08/13/20 0854     Updated:  08/13/20 0948    Narrative:       EXAMINATION: CT ANGIOGRAM CHEST W WO CONTRAST-08/13/2020:      INDICATION: Leukocytosis, somnolence, requiring 6L NC.      TECHNIQUE: CT angiogram chest with and without intravenous contrast  following PE protocol. 2-D reconstructions performed.     The radiation dose reduction device was turned on for each scan per the  ALARA (As Low as Reasonably Achievable) protocol.     COMPARISON: NONE.     FINDINGS: The thyroid is homogeneous. No bulky mediastinal adenopathy.  Central airways are patent. Esophagus in normal course and caliber.  Atherosclerotic nonaneurysmal thoracic aorta with patent great vessel  origins. Overall satisfactory opacification of the pulmonary arterial  tree for evaluation with right upper lobar artery of concern for filling  defect centrally series 4/image 33 for example. No evidence of right  heart strain as RV/LV ratio is within normal limits at 0.6, however,  enlarged right heart and cardiomegaly noted without pericardial  effusion. Extended lung windows demonstrate intralobular septal  thickening and opacifications of a lower lobe predominance consistent  with interstitial pulmonary edema pattern along with decompensation and  evidence of small bilateral pleural effusions. Adjacent opacifications  fairly confluent in the left lower lung concerning for atelectasis  versus airspace  disease involvement. Degenerative changes of the  thoracic spine without aggressive osseous lesion. No soft tissue body  wall findings of acuity. The visualized portions of the upper abdomen  are grossly unremarkable.       Impression:       1. Filling defect within the right upper lobar artery concerning for  pulmonary embolus without central filling defect otherwise noted and no  evidence for distinct right heart strain as RV/LV ratio within normal  limits at 0.6.     2. Cardiomegaly with right heart enlargement including right atrial  enlargement, however, no pericardial effusion.     3. Smooth intralobular septal thickening of a lower lobe predominance  consistent with interstitial edema pattern and likely decompensation  evidence as there are small bilateral pleural effusions present with  adjacent opacifications at the left lung base of atelectasis versus  airspace disease.     D:  08/13/2020  E:  08/13/2020             CT Head Without Contrast [[id number]] Collected:  08/13/20 0853     Updated:  08/13/20 0941    Narrative:       EXAMINATION: CT HEAD WO CONTRAST-08/13/2020:      INDICATION: Afib RVR, lethargy.      TECHNIQUE: CT head without intravenous contrast.     The radiation dose reduction device was turned on for each scan per the  ALARA (As Low as Reasonably Achievable) protocol.     COMPARISON: CT head dated 08/11/2020.     FINDINGS: The midline structures are symmetric without evidence of mass,  mass effect or midline shift. Ventricles and sulci within normal limits.  No intra-axial hemorrhage or extra-axial fluid collection. Globes and  orbits are unremarkable. The visualized paranasal sinuses and mastoid  cells are grossly clear and well pneumatized. Calvarium intact with  hyperostosis frontalis noted.       Impression:       No acute intracranial abnormality.     D:  08/13/2020  E:  08/13/2020                   Results for orders placed during the hospital encounter of 08/11/20   Adult  Transthoracic Echo Limited W/ Cont if Necessary Per Protocol    Narrative · Left ventricular systolic function is normal. Estimated EF = 50%.  · Severe biatrial enlargement.  · Severe tricuspid valve regurgitation is present.  · Severe pulmonary hypertension is present. Estimated right ventricular   systolic pressure from tricuspid regurgitation is markedly elevated (83   mmHg).          I have reviewed the medications:  Scheduled Meds:    !Vancomycin Level Draw Needed  Does not apply Once   atenolol 50 mg Oral Q24H   cefTRIAXone 1 g Intravenous Q24H   insulin lispro 0-7 Units Subcutaneous 4x Daily With Meals & Nightly   sodium chloride 10 mL Intravenous Q12H   sodium zirconium cyclosilicate 10 g Oral Once   vancomycin 750 mg Intravenous Q24H     Continuous Infusions:    dilTIAZem 5-15 mg/hr Last Rate: Stopped (08/13/20 1700)   heparin 18 Units/kg/hr Last Rate: 18 Units/kg/hr (08/13/20 2015)   lactated ringers 9 mL/hr Last Rate: 9 mL/hr (08/12/20 1510)   Pharmacy to Dose Heparin       PRN Meds:.bisacodyl  •  dextrose  •  dextrose  •  glucagon (human recombinant)  •  HYDROcodone-acetaminophen  •  lactated ringers  •  Morphine  •  naloxone  •  Pharmacy to Dose Heparin  •  promethazine  •  sodium chloride    Assessment/Plan   Assessment & Plan     Active Hospital Problems    Diagnosis  POA   • **Hip fracture (CMS/[facility]) [S72.009A]  Yes     Priority: High   • Acute pulmonary embolism (CMS/[facility]) [I26.99]  Unknown     Priority: High   • Pulmonary hypertension (CMS/[facility]) [I27.20]  Unknown     Priority: High   • Tricuspid regurgitation [I07.1]  Unknown     Priority: High   • Acute respiratory failure with hypoxia (CMS/[facility]) [J96.01]  Unknown     Priority: High   • Status post hip hemiarthroplasty [Z96.649]  Not Applicable     Priority: High   • Altered mental status [R41.82]  Yes     Priority: High   • Acute UTI (urinary tract infection) [N39.0]  Yes     Priority: Medium   • Fall [W19.XXXA]  Unknown     Priority: Medium   •  "Atrial fibrillation (CMS/HCC) [I48.91]  Yes     Priority: Medium   • Hyperlipidemia [E78.5]  Yes     Priority: Low   • Hypertension [I10]  Yes     Priority: Low   • CAD (coronary artery disease) [I25.10]  Yes     Priority: Low   • Iron deficiency anemia [D50.9]  Yes      Resolved Hospital Problems   No resolved problems to display.        Brief Hospital Course to date:  Analilia De Leon is a 85 y.o. female with h/o CAD, HTN, T2DM, HLD as well as pAF on xarelto who presented as a transfer from OSH due to acute right sided hip fracture. Post-op, pt had acute hypoxic respiratory failure initially requiring nonrebreather and pt had AMS that improved with IV narcan.     Plan:    Right hip fracture due to mechanical fall  --s/p OR with Dr. Lopez POD 2  -symptom mgt; PRN Morphine pain control  -case mgt consult   -CT head unremarkable  -COVID-19 pre-procedure swab NEGATIVE    Acute hypoxic respiratory failure  Acute PE  Flash pulmonary edema  Severe Pulmonary HTN  --CXR appears to show edema.  CTA chest with possible PE. Also,  appears to have edema and bilateral (L>R) effusions per my personal evaluation  --proBNP 9,000 suspect flash pulm edema due to Afib with RVR  --received IV Lasix 40mg yesterday, net negative 1400 for 24 hours  --TTE done to assess LVEF and to evaluate for right heart strain.  Normal EF with severe pulmonary HTN, tricuspid regurg with RVSP of 83.  Consult Cardiology for mgmt.   --holding off on patient's Xarelto for now due to above, but will do heparin gtt in the meantime given presence of PE but risk of bleeding in post-op site.     Sepsis (tachycardic, leukocytosis)  --likely due to aspiration event vs UTI which was POA  --blood cultures from admission NGTD  --UCx as below  --continue IV Rocephin for now, continue Vanc. If MRSA PCR NEGATIVE (still pending from order 8/13), will stop Vanc     Altered mental status  -per family pt became \"mildly\" confused after IV Narcotics given at OSH  - initial CT " head unremarkable, repeat CT head PENDING read.   --ABG with only hypoxia  --improved after IV Narcan this am. Will use lowest dose possible of pain medication since this seems to be a recurring issue     Ecoli UTI  -per OSH UA (+nitrites, 2+ bacteria, WBC 30)  -started on IV Rocephin, Reviewed cultures from there, appears to be E coli which was pan-susceptible. D3 of ABX  -repeat blood and UA/urine cultures NGTD.  UCx here may be of little value due to antibiotic modification  -lactic and procal wnl     T2DM  -hem a1c 6.8%  -FSBG q ac/hs w/ LDSS  -hold routine metformin      PAF  -CHADSVASC 5  -on routine Xarelto; held for surgery (last dose Sunday, 8/9)   --continue rate controlling meds- on Atenolol at home per med rec  --start Dilt gtt as she is in Afib with RVR this am and unable to tolerate PO meds at the moment.       HTN/HLD  -IV Lopressor now for BP and HR control  --will resume home meds        DVT prophylaxis:  Will do Heparin gtt given new PE, holding Xarelto due to recent surgery.  If any bleeding, etc from surgical site, will need to d/c Heparin gtt.          Disposition: I expect the patient to be discharged tbd    CODE STATUS:   Code Status and Medical Interventions:   Ordered at: 08/12/20 2020     Code Status:    CPR     Medical Interventions (Level of Support Prior to Arrest):    Full         Electronically signed by Becka Peres MD, 08/14/20, 08:57.

## 2020-08-15 LAB
ABO GROUP BLD: NORMAL
ANION GAP SERPL CALCULATED.3IONS-SCNC: 11 MMOL/L (ref 5–15)
BLD GP AB SCN SERPL QL: NEGATIVE
BUN SERPL-MCNC: 36 MG/DL (ref 8–23)
BUN/CREAT SERPL: 35 (ref 7–25)
CALCIUM SPEC-SCNC: 8.6 MG/DL (ref 8.6–10.5)
CHLORIDE SERPL-SCNC: 108 MMOL/L (ref 98–107)
CO2 SERPL-SCNC: 22 MMOL/L (ref 22–29)
CREAT SERPL-MCNC: 1.03 MG/DL (ref 0.57–1)
DEPRECATED RDW RBC AUTO: 51.5 FL (ref 37–54)
ERYTHROCYTE [DISTWIDTH] IN BLOOD BY AUTOMATED COUNT: 14.8 % (ref 12.3–15.4)
GFR SERPL CREATININE-BSD FRML MDRD: 51 ML/MIN/1.73
GLUCOSE BLDC GLUCOMTR-MCNC: 171 MG/DL (ref 70–130)
GLUCOSE BLDC GLUCOMTR-MCNC: 177 MG/DL (ref 70–130)
GLUCOSE BLDC GLUCOMTR-MCNC: 188 MG/DL (ref 70–130)
GLUCOSE BLDC GLUCOMTR-MCNC: 202 MG/DL (ref 70–130)
GLUCOSE BLDC GLUCOMTR-MCNC: 209 MG/DL (ref 70–130)
GLUCOSE SERPL-MCNC: 146 MG/DL (ref 65–99)
HCT VFR BLD AUTO: 23.4 % (ref 34–46.6)
HGB BLD-MCNC: 6.8 G/DL (ref 12–15.9)
MCH RBC QN AUTO: 27.9 PG (ref 26.6–33)
MCHC RBC AUTO-ENTMCNC: 29.1 G/DL (ref 31.5–35.7)
MCV RBC AUTO: 95.9 FL (ref 79–97)
PLATELET # BLD AUTO: 180 10*3/MM3 (ref 140–450)
PMV BLD AUTO: 11.2 FL (ref 6–12)
POTASSIUM SERPL-SCNC: 4.2 MMOL/L (ref 3.5–5.2)
RBC # BLD AUTO: 2.44 10*6/MM3 (ref 3.77–5.28)
RH BLD: POSITIVE
SODIUM SERPL-SCNC: 141 MMOL/L (ref 136–145)
T&S EXPIRATION DATE: NORMAL
UFH PPP CHRO-ACNC: 0.45 IU/ML (ref 0.3–0.7)
WBC # BLD AUTO: 13.71 10*3/MM3 (ref 3.4–10.8)

## 2020-08-15 PROCEDURE — 80048 BASIC METABOLIC PNL TOTAL CA: CPT | Performed by: INTERNAL MEDICINE

## 2020-08-15 PROCEDURE — 86900 BLOOD TYPING SEROLOGIC ABO: CPT | Performed by: INTERNAL MEDICINE

## 2020-08-15 PROCEDURE — 85027 COMPLETE CBC AUTOMATED: CPT | Performed by: INTERNAL MEDICINE

## 2020-08-15 PROCEDURE — 82962 GLUCOSE BLOOD TEST: CPT

## 2020-08-15 PROCEDURE — 25010000002 HEPARIN (PORCINE) 25000-0.45 UT/250ML-% SOLUTION

## 2020-08-15 PROCEDURE — 85520 HEPARIN ASSAY: CPT

## 2020-08-15 PROCEDURE — 86923 COMPATIBILITY TEST ELECTRIC: CPT

## 2020-08-15 PROCEDURE — 86901 BLOOD TYPING SEROLOGIC RH(D): CPT | Performed by: INTERNAL MEDICINE

## 2020-08-15 PROCEDURE — 86850 RBC ANTIBODY SCREEN: CPT | Performed by: INTERNAL MEDICINE

## 2020-08-15 PROCEDURE — P9016 RBC LEUKOCYTES REDUCED: HCPCS

## 2020-08-15 PROCEDURE — 99233 SBSQ HOSP IP/OBS HIGH 50: CPT | Performed by: INTERNAL MEDICINE

## 2020-08-15 PROCEDURE — 36430 TRANSFUSION BLD/BLD COMPNT: CPT

## 2020-08-15 PROCEDURE — 25010000002 CEFTRIAXONE PER 250 MG: Performed by: INTERNAL MEDICINE

## 2020-08-15 PROCEDURE — 86900 BLOOD TYPING SEROLOGIC ABO: CPT

## 2020-08-15 PROCEDURE — 63710000001 INSULIN LISPRO (HUMAN) PER 5 UNITS: Performed by: ORTHOPAEDIC SURGERY

## 2020-08-15 PROCEDURE — 97110 THERAPEUTIC EXERCISES: CPT

## 2020-08-15 PROCEDURE — 25010000002 FUROSEMIDE PER 20 MG: Performed by: INTERNAL MEDICINE

## 2020-08-15 RX ORDER — FUROSEMIDE 10 MG/ML
40 INJECTION INTRAMUSCULAR; INTRAVENOUS ONCE
Status: COMPLETED | OUTPATIENT
Start: 2020-08-15 | End: 2020-08-15

## 2020-08-15 RX ADMIN — INSULIN LISPRO 3 UNITS: 100 INJECTION, SOLUTION INTRAVENOUS; SUBCUTANEOUS at 20:32

## 2020-08-15 RX ADMIN — INSULIN LISPRO 2 UNITS: 100 INJECTION, SOLUTION INTRAVENOUS; SUBCUTANEOUS at 16:32

## 2020-08-15 RX ADMIN — CEFTRIAXONE SODIUM 1 G: 1 INJECTION, POWDER, FOR SOLUTION INTRAMUSCULAR; INTRAVENOUS at 21:54

## 2020-08-15 RX ADMIN — HEPARIN SODIUM 18 UNITS/KG/HR: 10000 INJECTION, SOLUTION INTRAVENOUS at 15:31

## 2020-08-15 RX ADMIN — HYDROCODONE BITARTRATE AND ACETAMINOPHEN 1 TABLET: 5; 325 TABLET ORAL at 16:32

## 2020-08-15 RX ADMIN — MAGNESIUM HYDROXIDE 10 ML: 2400 SUSPENSION ORAL at 08:16

## 2020-08-15 RX ADMIN — SODIUM CHLORIDE, PRESERVATIVE FREE 10 ML: 5 INJECTION INTRAVENOUS at 20:32

## 2020-08-15 RX ADMIN — ACETAMINOPHEN ORAL SOLUTION 649.6 MG: 650 SOLUTION ORAL at 13:04

## 2020-08-15 RX ADMIN — ATENOLOL 50 MG: 50 TABLET ORAL at 08:16

## 2020-08-15 RX ADMIN — INSULIN LISPRO 2 UNITS: 100 INJECTION, SOLUTION INTRAVENOUS; SUBCUTANEOUS at 08:16

## 2020-08-15 RX ADMIN — INSULIN LISPRO 3 UNITS: 100 INJECTION, SOLUTION INTRAVENOUS; SUBCUTANEOUS at 11:54

## 2020-08-15 RX ADMIN — FUROSEMIDE 40 MG: 10 INJECTION, SOLUTION INTRAMUSCULAR; INTRAVENOUS at 13:33

## 2020-08-15 NOTE — PLAN OF CARE
Problem: Patient Care Overview  Goal: Plan of Care Review  Outcome: Ongoing (interventions implemented as appropriate)  Flowsheets (Taken 8/15/2020 1036)  Progress: no change  Plan of Care Reviewed With: patient  Outcome Summary: patient oriented to self, she was able to participate in B LE/UE exercises with assist and max verbal cues. No OOB activity due to weakness, nursing to give blood post tx. Weakness and pain limting activity tolerance.

## 2020-08-15 NOTE — PROGRESS NOTES
HEPARIN INFUSION  Analilia De Leon is a  85 y.o. female receiving heparin infusion.     Therapy for (VTE/Cardiac):   PE  Patient Weight: 59 kg  Initial Bolus (Y/N):   Y  Any Bolus (Y/N):   Y    Signs or Symptoms of Bleeding: Patien't H&H has dropped and she had to be tranfused with blood this morning. Per  This is likely christine-operative blood loss and continue with heparin gtt until HgB stable for 4 hours before transitioning to Xarelto.      VTE (PE/DVT)   Initial Bolus: 80 units/kg (Max 10,000 units)  Initial rate: 18 units/kg/hr (Max 1,500 units/hr)      (Anti-Xa) (IU/mL) Bolus Dose Stop Infusion Rate Change Repeat (Anti-Xa)     ? 0.19 80 units/kg 0 hrs Increase rate by   4 units/kg/hr 6 hrs      0.2 - 0.29 40 units/kg 0 hrs Increase rate by 2 units/kg/hr 6 hrs    0.3 - 0.7  0 0 hrs No change 6 hrs      0.71 - 0.99   0  0 hrs Decrease rate by 2 units/kg/hr 6 hrs      ? 1 0 Hold 1 hr Decrease rate by 3 units/kg/hr 6 hrs        Results from last 7 days   Lab Units 08/15/20  0615 08/14/20  0154 08/13/20  1241  08/11/20 2028   INR   --   --  1.48*  --  1.28*   HEMOGLOBIN g/dL 6.8* 7.3* 8.1*   < >  --    HEMATOCRIT % 23.4* 25.4* 27.1*   < >  --    PLATELETS 10*3/mm3 180 163 159   < >  --     < > = values in this interval not displayed.          Date   Time   Anti-Xa Current Rate (Unit/kg/hr) Bolus   (Units) Rate Change   (Unit/kg/hr) New Rate (Unit/kg/hr) Next   Anti-Xa Comments  Pump Check Daily   8/13 1224 0.1 0 4500 18 18 1700 PATRICIA Villarreal    8/13 1818 0.1 18 3000 + 4 22 0200 Spoke with Agueda 8380   8/13 2000 -- -- -- -4 18 0200 RN called back weight on heparin drip entered into pump was 18 kg not 59 kg; will still give above bolus but will  set rate to 18 units/kg/hr   8/14 0154 0.61 18 -- -- 18 0800 Will discuss w/ nurse   8/14 0750 0.67 18 -- -- 18 1400 D/W Yeny    8/14 1336 0.59 18 -- -- 18 8/15 am labs D/W Yeny    8/15 0615 0.45 18 -- -- 18 0600 D/W Yves                                                                                                                                                                Gwendolyn Camp, PharmD  Pharmacy Resident  8/15/2020  14:45

## 2020-08-15 NOTE — THERAPY TREATMENT NOTE
"Patient Name: Analilia De Leon  : 1935    MRN: [id number]                              Today's Date: 8/15/2020       Admit Date: 2020    Visit Dx:     ICD-10-CM ICD-9-CM   1. Hyperkalemia E87.5 276.7     Patient Active Problem List   Diagnosis   • Iron deficiency anemia   • Hypertension   • Atrial fibrillation (CMS/HCC)   • Hyperlipidemia   • CAD (coronary artery disease)   • Hip fracture (CMS/HCC)   • Acute UTI (urinary tract infection)   • Altered mental status   • Fall   • Status post hip hemiarthroplasty   • Acute respiratory failure with hypoxia (CMS/HCC)   • Acute pulmonary embolism (CMS/HCC)   • Pulmonary hypertension (CMS/HCC)   • Tricuspid regurgitation     Past Medical History:   Diagnosis Date   • Ataxia 2017   • Atrial fibrillation (CMS/HCC)    • BPPV (benign paroxysmal positional vertigo) 2017   • Chronic anticoagulation    • Facial numbness     takes Keppra for \"facial numbness\"    • History of blood transfusion    • Hypertension    • Microangiopathy (CMS/HCC) 2017   • T2DM (type 2 diabetes mellitus) (CMS/HCC)    • TIA (transient ischemic attack) 3/17/2017   • Weakness 2017     Past Surgical History:   Procedure Laterality Date   • CHOLECYSTECTOMY     • ENDOSCOPY  2015    showed 2 ulcerated lesions in the gastric antrum   • HIP HEMIARTHROPLASTY Right 2020    Procedure: HIP BI-POLAR RIGHT;  Surgeon: Ramirez Lopez MD;  Location: [facility];  Service: Orthopedics;  Laterality: Right;   • HYSTERECTOMY     • KNEE SURGERY Right    • OOPHORECTOMY       General Information     Row Name 08/15/20 1029          PT Evaluation Time/Intention    Document Type  therapy note (daily note)  -AS     Mode of Treatment  physical therapy  -AS     Row Name 08/15/20 1029          General Information    Patient Profile Reviewed?  yes  -AS     Existing Precautions/Restrictions  fall;right;hip, posterior  -AS     Barriers to Rehab  medically complex  -AS     Row Name 08/15/20 1029          " Cognitive Assessment/Intervention- PT/OT    Orientation Status (Cognition)  oriented to;person  -AS     Cognitive Assessment/Intervention Comment  lethargic but at times alert, following commands with frequent verbal cues  -AS     Row Name 08/15/20 1029          Safety Issues, Functional Mobility    Safety Issues Affecting Function (Mobility)  ability to follow commands;awareness of need for assistance;insight into deficits/self awareness;safety precaution awareness;judgment;safety precautions follow-through/compliance  -AS     Impairments Affecting Function (Mobility)  strength;endurance/activity tolerance;motor control;range of motion (ROM);cognition  -AS       User Key  (r) = Recorded By, (t) = Taken By, (c) = Cosigned By    Initials Name Provider Type    AS Celina Jimenez PTA Physical Therapy Assistant        Mobility     Row Name 08/15/20 1031          Bed Mobility Assessment/Treatment    Scooting/Bridging [place] (Bed Mobility)  dependent (less than 25% patient effort);2 person assist to reposition to HOB  -AS     Supine-Sit [place] (Bed Mobility)  not tested  -AS     Comment (Bed Mobility)  bed mobility deferrred due to weakness and increased assist needed, safety concerns. Patient needing blood transfusion.  -AS     Row Name 08/15/20 1031          Transfer Assessment/Treatment    Comment (Transfers)  no OOB per nursing due to patient needing blood transfusion  -AS     Row Name 08/15/20 1031          Bed-Chair Transfer    Bed-Chair [place] (Transfers)  unable to assess  -AS     Row Name 08/15/20 1031          Sit-Stand Transfer    Sit-Stand [place] (Transfers)  unable to assess  -AS     Row Name 08/15/20 1031          Gait/Stairs Assessment/Training    [place] Level (Gait)  unable to assess  -AS     Comment (Gait/Stairs)  No OOB  -AS     Row Name 08/15/20 1031          Mobility Assessment/Intervention    Extremity Weight-bearing Status  right lower extremity  -AS     Right Lower  Extremity (Weight-bearing Status)  weight-bearing as tolerated (WBAT)  -AS       User Key  (r) = Recorded By, (t) = Taken By, (c) = Cosigned By    Initials Name Provider Type    AS Celina Jimenez PTA Physical Therapy Assistant        Obj/Interventions     Row Name 08/15/20 1033          Therapeutic Exercise    Upper Extremity Range of Motion (Therapeutic Exercise)  shoulder flexion/extension, bilateral;shoulder abduction/adduction, bilateral;elbow flexion/extension, bilateral  -AS     Lower Extremity (Therapeutic Exercise)  heel slides, bilateral;SLR (straight leg raise), bilateral;gluteal sets;quad sets, right  -AS     Lower Extremity Range of Motion (Therapeutic Exercise)  hip abduction/adduction, bilateral  -AS     Exercise Type (Therapeutic Exercise)  AAROM (active assistive range of motion);PROM (passive range of motion)  -AS     Position (Therapeutic Exercise)  supine  -AS     Sets/Reps (Therapeutic Exercise)  1/10  -AS     Comment (Therapeutic Exercise)  patient with increased difficulty following directions for exercises despite max verbal cueing  -AS       User Key  (r) = Recorded By, (t) = Taken By, (c) = Cosigned By    Initials Name Provider Type    AS Celina Jimenez PTA Physical Therapy Assistant        Goals/Plan    No documentation.       Clinical Impression     Row Name 08/15/20 1034          Pain Assessment    Additional Documentation  Pain Scale: FACES Pre/Post-Treatment (Group)  -AS     Row Name 08/15/20 1034          Pain Scale: Numbers Pre/Post-Treatment    Pain Location - Side  Right  -AS     Pain Location  hip  -AS     Pain Intervention(s)  Ambulation/increased activity;Repositioned  -AS     Row Name 08/15/20 1034          Pain Scale: FACES Pre/Post-Treatment    Pain: FACES Scale, Pretreatment  2-->hurts little bit  -AS     Pain: FACES Scale, Post-Treatment  4-->hurts little more  -AS     Row Name 08/15/20 1034          Positioning and Restraints    Pre-Treatment Position  in bed   -AS     Post Treatment Position  bed  -AS     In Bed  supine;call light within reach;encouraged to call for assist;exit alarm on;ABD pillow;SCD pump applied  -AS       User Key  (r) = Recorded By, (t) = Taken By, (c) = Cosigned By    Initials Name Provider Type    AS Celina Jimenez PTA Physical Therapy Assistant        Outcome Measures     Row Name 08/15/20 1036          How much help from another person do you currently need...    Turning from your back to your side while in flat bed without using bedrails?  1  -AS     Moving from lying on back to sitting on the side of a flat bed without bedrails?  1  -AS     Moving to and from a bed to a chair (including a wheelchair)?  1  -AS     Standing up from a chair using your arms (e.g., wheelchair, bedside chair)?  1  -AS     Climbing 3-5 steps with a railing?  1  -AS     To walk in hospital room?  1  -AS     AM-PAC 6 Clicks Score (PT)  6  -AS     Row Name 08/15/20 1036          Functional Assessment    Outcome Measure Options  AM-PAC 6 Clicks Basic Mobility (PT)  -AS       User Key  (r) = Recorded By, (t) = Taken By, (c) = Cosigned By    Initials Name Provider Type    AS Celina Jimenez PTA Physical Therapy Assistant        Physical Therapy Education                 Title: PT OT SLP Therapies (In Progress)     Topic: Physical Therapy (In Progress)     Point: Mobility training (In Progress)     Description:   Instruct learner(s) on safety and technique for assisting patient out of bed, chair or wheelchair.  Instruct in the proper use of assistive devices, such as walker, crutches, cane or brace.              Patient Friendly Description:   It's important to get you on your feet again, but we need to do so in a way that is safe for you. Falling has serious consequences, and your personal safety is the most important thing of all.        When it's time to get out of bed, one of us or a family member will sit next to you on the bed to give you support.     If your  doctor or nurse tells you to use a walker, crutches, a cane, or a brace, be sure you use it every time you get out of bed, even if you think you don't need it.    Learning Progress Summary           Patient Acceptance, E, NR by AS at 8/15/2020 1036    Acceptance, E, NR by SC at 8/14/2020 1458    Comment:  reviewed HEP    Acceptance, E,D, VU,NR by LR at 8/13/2020 1528    Comment:  Educated on posterior hip precautions, weight bearing status, correct supine to sit t/f technique, correct sit<->stand t/f technique, safety with mobility, and progression of POC.   Family Acceptance, E, NR by SC at 8/14/2020 1458    Comment:  reviewed HEP    Acceptance, E,D, VU,NR by LR at 8/13/2020 1528    Comment:  Educated on posterior hip precautions, weight bearing status, correct supine to sit t/f technique, correct sit<->stand t/f technique, safety with mobility, and progression of POC.                   Point: Home exercise program (In Progress)     Description:   Instruct learner(s) on appropriate technique for monitoring, assisting and/or progressing patient with therapeutic exercises and activities.              Learning Progress Summary           Patient Acceptance, E, NR by AS at 8/15/2020 1036    Acceptance, E, NR by SC at 8/14/2020 1458    Comment:  reviewed HEP    Acceptance, E,D, VU,NR by LR at 8/13/2020 1528    Comment:  Educated on posterior hip precautions, weight bearing status, correct supine to sit t/f technique, correct sit<->stand t/f technique, safety with mobility, and progression of POC.   Family Acceptance, E, NR by SC at 8/14/2020 1458    Comment:  reviewed HEP    Acceptance, E,D, VU,NR by LR at 8/13/2020 1528    Comment:  Educated on posterior hip precautions, weight bearing status, correct supine to sit t/f technique, correct sit<->stand t/f technique, safety with mobility, and progression of POC.                   Point: Body mechanics (In Progress)     Description:   Instruct learner(s) on proper  positioning and spine alignment for patient and/or caregiver during mobility tasks and/or exercises.              Learning Progress Summary           Patient Acceptance, E, NR by AS at 8/15/2020 1036    Acceptance, E, NR by SC at 8/14/2020 1458    Comment:  reviewed HEP    Acceptance, E,D, VU,NR by LR at 8/13/2020 1528    Comment:  Educated on posterior hip precautions, weight bearing status, correct supine to sit t/f technique, correct sit<->stand t/f technique, safety with mobility, and progression of POC.   Family Acceptance, E, NR by SC at 8/14/2020 1458    Comment:  reviewed HEP    Acceptance, E,D, VU,NR by LR at 8/13/2020 1528    Comment:  Educated on posterior hip precautions, weight bearing status, correct supine to sit t/f technique, correct sit<->stand t/f technique, safety with mobility, and progression of POC.                   Point: Precautions (In Progress)     Description:   Instruct learner(s) on prescribed precautions during mobility and gait tasks              Learning Progress Summary           Patient Acceptance, E, NR by AS at 8/15/2020 1036    Acceptance, E, NR by SC at 8/14/2020 1458    Comment:  reviewed HEP    Acceptance, E,D, VU,NR by LR at 8/13/2020 1528    Comment:  Educated on posterior hip precautions, weight bearing status, correct supine to sit t/f technique, correct sit<->stand t/f technique, safety with mobility, and progression of POC.   Family Acceptance, E, NR by SC at 8/14/2020 1458    Comment:  reviewed HEP    Acceptance, E,D, VU,NR by  at 8/13/2020 1528    Comment:  Educated on posterior hip precautions, weight bearing status, correct supine to sit t/f technique, correct sit<->stand t/f technique, safety with mobility, and progression of POC.                               User Key     Initials Effective Dates Name Provider Type Discipline    SC 06/19/15 -  Maykel Matthew, PT Physical Therapist PT     06/19/15 -  Natasha Monroe, PT Physical Therapist PT    AS  06/22/15 -  Celina Jimenez PTA Physical Therapy Assistant PT              PT Recommendation and Plan     Plan of Care Reviewed With: patient  Progress: no change  Outcome Summary: patient oriented to self, she was able to participate in B LE/UE exercises with assist and max verbal cues. No OOB activity due to weakness, nursing to give blood post tx. Weakness and pain limting activity tolerance.     Time Calculation:   PT Charges     Row Name 08/15/20 1038             Time Calculation    Start Time  0955  -AS      PT Received On  08/15/20  -AS      PT Goal Re-Cert Due Date  08/23/20  -AS         Timed Charges    16544 - PT Therapeutic Exercise Minutes  23  -AS        User Key  (r) = Recorded By, (t) = Taken By, (c) = Cosigned By    Initials Name Provider Type    AS Celina Jimenez PTA Physical Therapy Assistant        Therapy Charges for Today     Code Description Service Date Service Provider Modifiers Qty    [id number] HC PT THER PROC EA 15 MIN 8/15/2020 Celina Jimenez PTA GP 2          PT G-Codes  Outcome Measure Options: AM-PAC 6 Clicks Basic Mobility (PT)  AM-PAC 6 Clicks Score (PT): 6    Celina Jimenez PTA  8/15/2020

## 2020-08-15 NOTE — PROGRESS NOTES
[facility] Medicine Services  PROGRESS NOTE    Patient Name: Analilia De Leon  : 1935  MRN: [id number]    Date of Admission: 2020  Primary Care Physician: Sravan Franco APRN    Subjective   Subjective     CC:  Right hip fracture    HPI:  Pt seen and examined. Nursing notes reviewed. Pt more alert today. H&H dropped over last 2 days and now getting two units of blood. Pt preoccupied with her breakfast tray.     Review of Systems  Gen- No fevers, chills  CV- No chest pain, palpitations  Resp- No cough, dyspnea  GI- No N/V/D, abd pain    Objective   Objective     Vital Signs:   Temp:  [97.8 °F (36.6 °C)-98.5 °F (36.9 °C)] 98.5 °F (36.9 °C)  Heart Rate:  [77-95] 81  Resp:  [16-18] 18  BP: (124-167)/(56-82) 157/82        Physical Exam:  Constitutional: No acute distress, awake more interactive this am but very preoccupied with reaching her breakfast tray  HENT: NCAT, mucous membranes moist  Respiratory: Clear to auscultation bilaterally, respiratory effort normal   Cardiovascular: irregularly irregular with rates in the mid 80s, appears afib on tele,  no murmurs, rubs, or gallops, palpable pedal pulses bilaterally  Gastrointestinal: Positive bowel sounds, soft, nontender, nondistended  Musculoskeletal: No bilateral ankle edema, RLE wound site c/d/i  Psychiatric: Appropriate affect, cooperative  Neurologic: alert, unable to answer questions appropriately this am  Skin: No rashes    Results Reviewed:  Results from last 7 days   Lab Units 08/15/20  0615 20  0154 20  1241  20   WBC 10*3/mm3 13.71* 16.29* 17.02*   < >  --    HEMOGLOBIN g/dL 6.8* 7.3* 8.1*   < >  --    HEMATOCRIT % 23.4* 25.4* 27.1*   < >  --    PLATELETS 10*3/mm3 180 163 159   < >  --    INR   --   --  1.48*  --  1.28*   PROCALCITONIN ng/mL  --   --   --   --  0.07    < > = values in this interval not displayed.     Results from last 7 days   Lab Units 08/15/20  0615 20  0550  08/12/20  0344 08/11/20 2028   SODIUM mmol/L 141 137 140 138   POTASSIUM mmol/L 4.2 5.5* 5.0 5.6*   CHLORIDE mmol/L 108* 109* 106 106   CO2 mmol/L 22.0 19.0* 24.0 21.0*   BUN mg/dL 36* 21 18 13   CREATININE mg/dL 1.03* 1.03* 0.90 0.99   GLUCOSE mg/dL 146* 197* 150* 178*   CALCIUM mg/dL 8.6 8.6 8.9 9.2   ALT (SGPT) U/L  --   --   --  15   AST (SGOT) U/L  --   --   --  23   TROPONIN T ng/mL  --  <0.010  --  <0.010   PROBNP pg/mL  --  9,136.0*  --   --      Estimated Creatinine Clearance: 33 mL/min (A) (by C-G formula based on SCr of 1.03 mg/dL (H)).    Microbiology Results Abnormal     Procedure Component Value - Date/Time    Blood Culture - Blood, Hand, Left [[id number]] Collected:  08/11/20 2054    Lab Status:  Preliminary result Specimen:  Blood from Hand, Left Updated:  08/15/20 0815     Blood Culture No growth at 3 days    Blood Culture - Blood, Hand, Left [[id number]] Collected:  08/12/20 0013    Lab Status:  Preliminary result Specimen:  Blood from Hand, Left Updated:  08/15/20 0015     Blood Culture No growth at 3 days    MRSA Screen, PCR (Inpatient) - Swab, Nares [[id number]]  (Normal) Collected:  08/14/20 1117    Lab Status:  Final result Specimen:  Swab from Nares Updated:  08/14/20 1252     MRSA PCR Negative    Narrative:       MRSA Negative    Urine Culture - Urine, Urine, Catheter In/Out [[id number]]  (Normal) Collected:  08/13/20 0627    Lab Status:  Final result Specimen:  Urine, Catheter In/Out Updated:  08/14/20 0725     Urine Culture No growth    Urine Culture - Urine, Urine, Catheter In/Out [[id number]]  (Normal) Collected:  08/12/20 0138    Lab Status:  Final result Specimen:  Urine, Catheter In/Out Updated:  08/13/20 1309     Urine Culture No growth    COVID PRE-OP / PRE-PROCEDURE SCREENING ORDER (NO ISOLATION) - Swab, Nasopharynx [[id number]] Collected:  08/12/20 0038    Lab Status:  Final result Specimen:  Swab from Nasopharynx Updated:  08/12/20 0142    Narrative:       The following orders were  created for panel order COVID PRE-OP / PRE-PROCEDURE SCREENING ORDER (NO ISOLATION) - Swab, Nasopharynx.  Procedure                               Abnormality         Status                     ---------                               -----------         ------                     COVID-19, ABBOTT IN-HOUS...[[id number]]  Normal              Final result                 Please view results for these tests on the individual orders.    COVID-19, ABBOTT IN-HOUSE,NP Swab (NO TRANSPORT MEDIA) 2 HR TAT - Swab, Nasopharynx [[id number]]  (Normal) Collected:  08/12/20 0038    Lab Status:  Final result Specimen:  Swab from Nasopharynx Updated:  08/12/20 0142     COVID19 Not Detected    Narrative:       Fact sheet for providers: https://www.fda.gov/media/909404/download     Fact sheet for patients: https://www.fda.gov/media/913459/download          Imaging Results (Last 24 Hours)     Procedure Component Value Units Date/Time    CT Head Without Contrast [[id number]] Collected:  08/13/20 0853     Updated:  08/14/20 1509    Narrative:       EXAMINATION: CT HEAD WO CONTRAST-08/13/2020:      INDICATION: Afib RVR, lethargy.      TECHNIQUE: CT head without intravenous contrast.     The radiation dose reduction device was turned on for each scan per the  ALARA (As Low as Reasonably Achievable) protocol.     COMPARISON: CT head dated 08/11/2020.     FINDINGS: The midline structures are symmetric without evidence of mass,  mass effect or midline shift. Ventricles and sulci within normal limits.  No intra-axial hemorrhage or extra-axial fluid collection. Globes and  orbits are unremarkable. The visualized paranasal sinuses and mastoid  cells are grossly clear and well pneumatized. Calvarium intact with  hyperostosis frontalis noted.       Impression:       No acute intracranial abnormality.     D:  08/13/2020  E:  08/13/2020           This report was finalized on 8/14/2020 3:06 PM by Dr. Randall Epps.       CT Angiogram Chest With & Without  Contrast [[id number]] Collected:  08/13/20 0854     Updated:  08/14/20 1509    Narrative:       EXAMINATION: CT ANGIOGRAM CHEST W WO CONTRAST-08/13/2020:      INDICATION: Leukocytosis, somnolence, requiring 6L NC.      TECHNIQUE: CT angiogram chest with and without intravenous contrast  following PE protocol. 2-D reconstructions performed.     The radiation dose reduction device was turned on for each scan per the  ALARA (As Low as Reasonably Achievable) protocol.     COMPARISON: NONE.     FINDINGS: The thyroid is homogeneous. No bulky mediastinal adenopathy.  Central airways are patent. Esophagus in normal course and caliber.  Atherosclerotic nonaneurysmal thoracic aorta with patent great vessel  origins. Overall satisfactory opacification of the pulmonary arterial  tree for evaluation with right upper lobar artery of concern for filling  defect centrally series 4/image 33 for example. No evidence of right  heart strain as RV/LV ratio is within normal limits at 0.6, however,  enlarged right heart and cardiomegaly noted without pericardial  effusion. Extended lung windows demonstrate intralobular septal  thickening and opacifications of a lower lobe predominance consistent  with interstitial pulmonary edema pattern along with decompensation and  evidence of small bilateral pleural effusions. Adjacent opacifications  fairly confluent in the left lower lung concerning for atelectasis  versus airspace disease involvement. Degenerative changes of the  thoracic spine without aggressive osseous lesion. No soft tissue body  wall findings of acuity. The visualized portions of the upper abdomen  are grossly unremarkable.       Impression:       1. Filling defect within the right upper lobar artery concerning for  pulmonary embolus without central filling defect otherwise noted and no  evidence for distinct right heart strain as RV/LV ratio within normal  limits at 0.6.     2. Cardiomegaly with right heart enlargement  including right atrial  enlargement, however, no pericardial effusion.     3. Smooth intralobular septal thickening of a lower lobe predominance  consistent with interstitial edema pattern and likely decompensation  evidence as there are small bilateral pleural effusions present with  adjacent opacifications at the left lung base of atelectasis versus  airspace disease.     D:  08/13/2020  E:  08/13/2020           This report was finalized on 8/14/2020 3:06 PM by Dr. Randall Epps.             Results for orders placed during the hospital encounter of 08/11/20   Adult Transthoracic Echo Limited W/ Cont if Necessary Per Protocol    Narrative · Left ventricular systolic function is normal. Estimated EF = 50%.  · Severe biatrial enlargement.  · Severe tricuspid valve regurgitation is present.  · Severe pulmonary hypertension is present. Estimated right ventricular   systolic pressure from tricuspid regurgitation is markedly elevated (83   mmHg).          I have reviewed the medications:  Scheduled Meds:    atenolol 50 mg Oral Q24H   cefTRIAXone 1 g Intravenous Q24H   furosemide 40 mg Intravenous Once   insulin lispro 0-7 Units Subcutaneous 4x Daily With Meals & Nightly   sodium chloride 10 mL Intravenous Q12H   sodium zirconium cyclosilicate 10 g Oral Once     Continuous Infusions:    dilTIAZem 5-15 mg/hr Last Rate: Stopped (08/13/20 1700)   heparin 18 Units/kg/hr Last Rate: 18 Units/kg/hr (08/14/20 1700)   lactated ringers 9 mL/hr Last Rate: 9 mL/hr (08/12/20 1510)   Pharmacy to Dose Heparin       PRN Meds:.•  acetaminophen  •  acetaminophen  •  bisacodyl  •  dextrose  •  dextrose  •  glucagon (human recombinant)  •  HYDROcodone-acetaminophen  •  lactated ringers  •  magnesium hydroxide  •  Morphine  •  naloxone  •  Pharmacy to Dose Heparin  •  promethazine  •  sodium chloride    Assessment/Plan   Assessment & Plan     Active Hospital Problems    Diagnosis  POA   • **Hip fracture (CMS/[facility]) [S72.009A]  Yes     Priority:  High   • Acute pulmonary embolism (CMS/HCC) [I26.99]  Unknown     Priority: High   • Pulmonary hypertension (CMS/HCC) [I27.20]  Unknown     Priority: High   • Tricuspid regurgitation [I07.1]  Unknown     Priority: High   • Acute respiratory failure with hypoxia (CMS/HCC) [J96.01]  Unknown     Priority: High   • Status post hip hemiarthroplasty [Z96.649]  Not Applicable     Priority: High   • Altered mental status [R41.82]  Yes     Priority: High   • Acute UTI (urinary tract infection) [N39.0]  Yes     Priority: Medium   • Fall [W19.XXXA]  Unknown     Priority: Medium   • Atrial fibrillation (CMS/HCC) [I48.91]  Yes     Priority: Medium   • Hyperlipidemia [E78.5]  Yes     Priority: Low   • Hypertension [I10]  Yes     Priority: Low   • CAD (coronary artery disease) [I25.10]  Yes     Priority: Low   • Iron deficiency anemia [D50.9]  Yes      Resolved Hospital Problems   No resolved problems to display.        Brief Hospital Course to date:  Analilia De Leon is a 85 y.o. female with h/o CAD, HTN, T2DM, HLD as well as pAF on xarelto who presented as a transfer from OSH due to acute right sided hip fracture. Post-op, pt had acute hypoxic respiratory failure initially requiring nonrebreather and pt had AMS that improved with IV narcan.     Plan:    Right hip fracture due to mechanical fall  --s/p OR with Dr. Lopez POD 3  -symptom mgt; PRN Morphine pain control  -case mgt consult   -CT head unremarkable  -COVID-19 pre-procedure swab NEGATIVE    Acute hypoxic respiratory failure  Acute PE  Flash pulmonary edema  Severe Pulmonary HTN  --CXR appears to show edema.  CTA chest with possible PE. Also,  appears to have edema and bilateral (L>R) effusions per my personal evaluation  --proBNP 9,000 suspect flash pulm edema due to Afib with RVR  --will give 40mg IV Lasix today due to need for blood transfusion. May ultimately need another dose  --TTE done to assess LVEF and to evaluate for right heart strain.  Normal EF with severe  "pulmonary HTN, tricuspid regurg with RVSP of 83.  Consult Cardiology for mgmt.   --holding off on patient's Xarelto for now due to above, but will do heparin gtt in the meantime given presence of PE but risk of bleeding in post-op site.     Sepsis (tachycardic, leukocytosis)  --likely due to aspiration event vs UTI which was POA  --blood cultures from admission NGTD  --UCx as below  --continue IV Rocephin for now,stopped Vanc as MRSA PCR was negative  --leukocytosis improving     Altered mental status  -per family pt became \"mildly\" confused after IV Narcotics given at OSH  - initial CT head unremarkable, repeat CT head PENDING read.   --ABG with only hypoxia  --improving, suspect this was all due to sedation during OR     Ecoli UTI  -per OSH UA (+nitrites, 2+ bacteria, WBC 30)  -started on IV Rocephin, Reviewed cultures from there, appears to be E coli which was pan-susceptible. D4 of ABX  -repeat blood and UA/urine cultures NGTD.  UCx here may be of little value due to antibiotic modification  -lactic and procal wnl     T2DM  -hem a1c 6.8%  -FSBG q ac/hs w/ LDSS  -hold routine metformin      PAF  -CHADSVASC 5  -on routine Xarelto; held for surgery (last dose Sunday, 8/9)   --continue rate controlling meds- on Atenolol at home per med rec  --started Dilt gtt, will leave order as is in case she were to go back into Afib with RVR      HTN/HLD  --resumed home meds    Acute Blood Loss Anemia  --HgB 6.8 this am, suspect this is lag in lab value and due to perioperative blood loss.  Transfuse two units today. Will hold off on transitioning her to Xarelto and maintain on heparin gtt until HgB is stable for 24 hours.         DVT prophylaxis:  Will do Heparin gtt given new PE, holding Xarelto due to recent surgery.  If any bleeding, etc from surgical site, will need to d/c Heparin gtt.          Disposition: I expect the patient to be discharged tbd    CODE STATUS:   Code Status and Medical Interventions:   Ordered at: " 08/12/20 2020     Code Status:    CPR     Medical Interventions (Level of Support Prior to Arrest):    Full         Electronically signed by Becka Peres MD, 08/15/20, 08:47.

## 2020-08-15 NOTE — PLAN OF CARE
Patient more alert this shift. Able to tell me her name and , but still confused to time, place and situation. Denies any pain. Pt sleeping between care rounds. VSS. Case management working on placement.

## 2020-08-15 NOTE — PROGRESS NOTES
"Analilia CARCAMO [facility]  [id number]  1935    /67   Pulse 80   Temp 98 °F (36.7 °C) (Oral)   Resp 16   Ht 154.9 cm (61\")   Wt 59 kg (130 lb)   SpO2 96%   BMI 24.56 kg/m²     Lab Results (last 24 hours)     Procedure Component Value Units Date/Time    POC Glucose Once [[id number]]  (Abnormal) Collected:  08/15/20 1047    Specimen:  Blood Updated:  08/15/20 1049     Glucose 209 mg/dL     Blood Culture - Blood, Hand, Left [[id number]] Collected:  08/11/20 2054    Specimen:  Blood from Hand, Left Updated:  08/15/20 0815     Blood Culture No growth at 3 days    Basic Metabolic Panel [[id number]]  (Abnormal) Collected:  08/15/20 0615    Specimen:  Blood Updated:  08/15/20 0801     Glucose 146 mg/dL      BUN 36 mg/dL      Creatinine 1.03 mg/dL      Sodium 141 mmol/L      Potassium 4.2 mmol/L      Chloride 108 mmol/L      CO2 22.0 mmol/L      Calcium 8.6 mg/dL      eGFR Non African Amer 51 mL/min/1.73      BUN/Creatinine Ratio 35.0     Anion Gap 11.0 mmol/L     Narrative:       GFR Normal >60  Chronic Kidney Disease <60  Kidney Failure <15      CBC (No Diff) [[id number]]  (Abnormal) Collected:  08/15/20 0615    Specimen:  Blood Updated:  08/15/20 0712     WBC 13.71 10*3/mm3      RBC 2.44 10*6/mm3      Hemoglobin 6.8 g/dL      Hematocrit 23.4 %      MCV 95.9 fL      MCH 27.9 pg      MCHC 29.1 g/dL      RDW 14.8 %      RDW-SD 51.5 fl      MPV 11.2 fL      Platelets 180 10*3/mm3     Heparin Anti-Xa [[id number]]  (Normal) Collected:  08/15/20 0615    Specimen:  Blood Updated:  08/15/20 0710     Heparin Anti-Xa (UFH) 0.45 IU/ml     POC Glucose Once [[id number]]  (Abnormal) Collected:  08/15/20 0658    Specimen:  Blood Updated:  08/15/20 0700     Glucose 171 mg/dL     Blood Culture - Blood, Hand, Left [[id number]] Collected:  08/12/20 0013    Specimen:  Blood from Hand, Left Updated:  08/15/20 0015     Blood Culture No growth at 3 days    POC Glucose Once [[id number]]  (Abnormal) Collected:  08/14/20 2008    Specimen:  Blood " Updated:  08/14/20 2009     Glucose 170 mg/dL     POC Glucose Once [[id number]]  (Abnormal) Collected:  08/14/20 1634    Specimen:  Blood Updated:  08/14/20 1636     Glucose 165 mg/dL     Heparin Anti-Xa [[id number]]  (Normal) Collected:  08/14/20 1336    Specimen:  Blood Updated:  08/14/20 1431     Heparin Anti-Xa (UFH) 0.59 IU/ml     Vancomycin, Trough [[id number]]  (Abnormal) Collected:  08/14/20 1336    Specimen:  Blood Updated:  08/14/20 1421     Vancomycin Trough 4.50 mcg/mL           Patient Care Team:  Sarvan Franco APRN as PCP - General (Family Medicine)    SUBJECTIVE  Pain appears well controlled.  Physical therapy limited today by transfusions.    PHYSICAL EXAM  More alert today.  Incision benign with no accumulated drainage.  Minimal thigh swelling  Neurovascularly intact to knees and toes       Hip fracture (CMS/HCC)    Iron deficiency anemia    Hypertension    Atrial fibrillation (CMS/HCC)    Hyperlipidemia    CAD (coronary artery disease)    Acute UTI (urinary tract infection)    Altered mental status    Fall    Status post hip hemiarthroplasty    Acute respiratory failure with hypoxia (CMS/HCC)    Acute pulmonary embolism (CMS/HCC)    Pulmonary hypertension (CMS/HCC)    Tricuspid regurgitation      PLAN / DISPOSITION:  Hemoglobin 6.8 today.  Transfusion in progress.  Discharge to SNF likely in 2 to 3 days (pending approval).    Ramirez Lopez MD  08/15/20  12:55

## 2020-08-16 LAB
ANION GAP SERPL CALCULATED.3IONS-SCNC: 13 MMOL/L (ref 5–15)
BASOPHILS # BLD AUTO: 0.05 10*3/MM3 (ref 0–0.2)
BASOPHILS NFR BLD AUTO: 0.4 % (ref 0–1.5)
BH BB BLOOD EXPIRATION DATE: NORMAL
BH BB BLOOD EXPIRATION DATE: NORMAL
BH BB BLOOD TYPE BARCODE: 5100
BH BB BLOOD TYPE BARCODE: 9500
BH BB DISPENSE STATUS: NORMAL
BH BB DISPENSE STATUS: NORMAL
BH BB PRODUCT CODE: NORMAL
BH BB PRODUCT CODE: NORMAL
BH BB UNIT NUMBER: NORMAL
BH BB UNIT NUMBER: NORMAL
BUN SERPL-MCNC: 33 MG/DL (ref 8–23)
BUN/CREAT SERPL: 37.5 (ref 7–25)
CALCIUM SPEC-SCNC: 8.6 MG/DL (ref 8.6–10.5)
CHLORIDE SERPL-SCNC: 100 MMOL/L (ref 98–107)
CO2 SERPL-SCNC: 23 MMOL/L (ref 22–29)
CREAT SERPL-MCNC: 0.88 MG/DL (ref 0.57–1)
CROSSMATCH INTERPRETATION: NORMAL
CROSSMATCH INTERPRETATION: NORMAL
DEPRECATED RDW RBC AUTO: 50.9 FL (ref 37–54)
EOSINOPHIL # BLD AUTO: 0.7 10*3/MM3 (ref 0–0.4)
EOSINOPHIL NFR BLD AUTO: 5.7 % (ref 0.3–6.2)
ERYTHROCYTE [DISTWIDTH] IN BLOOD BY AUTOMATED COUNT: 15.4 % (ref 12.3–15.4)
GFR SERPL CREATININE-BSD FRML MDRD: 61 ML/MIN/1.73
GLUCOSE BLDC GLUCOMTR-MCNC: 164 MG/DL (ref 70–130)
GLUCOSE BLDC GLUCOMTR-MCNC: 189 MG/DL (ref 70–130)
GLUCOSE BLDC GLUCOMTR-MCNC: 212 MG/DL (ref 70–130)
GLUCOSE BLDC GLUCOMTR-MCNC: 218 MG/DL (ref 70–130)
GLUCOSE SERPL-MCNC: 205 MG/DL (ref 65–99)
HCT VFR BLD AUTO: 31.5 % (ref 34–46.6)
HGB BLD-MCNC: 9.6 G/DL (ref 12–15.9)
IMM GRANULOCYTES # BLD AUTO: 0.16 10*3/MM3 (ref 0–0.05)
IMM GRANULOCYTES NFR BLD AUTO: 1.3 % (ref 0–0.5)
LYMPHOCYTES # BLD AUTO: 0.63 10*3/MM3 (ref 0.7–3.1)
LYMPHOCYTES NFR BLD AUTO: 5.1 % (ref 19.6–45.3)
MCH RBC QN AUTO: 27.9 PG (ref 26.6–33)
MCHC RBC AUTO-ENTMCNC: 30.5 G/DL (ref 31.5–35.7)
MCV RBC AUTO: 91.6 FL (ref 79–97)
MONOCYTES # BLD AUTO: 1.43 10*3/MM3 (ref 0.1–0.9)
MONOCYTES NFR BLD AUTO: 11.7 % (ref 5–12)
NEUTROPHILS NFR BLD AUTO: 75.8 % (ref 42.7–76)
NEUTROPHILS NFR BLD AUTO: 9.28 10*3/MM3 (ref 1.7–7)
NRBC BLD AUTO-RTO: 0.4 /100 WBC (ref 0–0.2)
PLATELET # BLD AUTO: 187 10*3/MM3 (ref 140–450)
PMV BLD AUTO: 11.7 FL (ref 6–12)
POTASSIUM SERPL-SCNC: 4.1 MMOL/L (ref 3.5–5.2)
RBC # BLD AUTO: 3.44 10*6/MM3 (ref 3.77–5.28)
SODIUM SERPL-SCNC: 136 MMOL/L (ref 136–145)
UFH PPP CHRO-ACNC: 0.31 IU/ML (ref 0.3–0.7)
UNIT  ABO: NORMAL
UNIT  ABO: NORMAL
UNIT  RH: NORMAL
UNIT  RH: NORMAL
WBC # BLD AUTO: 12.25 10*3/MM3 (ref 3.4–10.8)

## 2020-08-16 PROCEDURE — 97530 THERAPEUTIC ACTIVITIES: CPT

## 2020-08-16 PROCEDURE — 25010000002 CEFTRIAXONE PER 250 MG: Performed by: INTERNAL MEDICINE

## 2020-08-16 PROCEDURE — 82962 GLUCOSE BLOOD TEST: CPT

## 2020-08-16 PROCEDURE — 63710000001 INSULIN DETEMIR PER 5 UNITS: Performed by: INTERNAL MEDICINE

## 2020-08-16 PROCEDURE — 80048 BASIC METABOLIC PNL TOTAL CA: CPT | Performed by: INTERNAL MEDICINE

## 2020-08-16 PROCEDURE — 85520 HEPARIN ASSAY: CPT

## 2020-08-16 PROCEDURE — 99024 POSTOP FOLLOW-UP VISIT: CPT | Performed by: ORTHOPAEDIC SURGERY

## 2020-08-16 PROCEDURE — 99233 SBSQ HOSP IP/OBS HIGH 50: CPT | Performed by: INTERNAL MEDICINE

## 2020-08-16 PROCEDURE — 85025 COMPLETE CBC W/AUTO DIFF WBC: CPT

## 2020-08-16 PROCEDURE — 63710000001 INSULIN LISPRO (HUMAN) PER 5 UNITS: Performed by: ORTHOPAEDIC SURGERY

## 2020-08-16 PROCEDURE — P9612 CATHETERIZE FOR URINE SPEC: HCPCS

## 2020-08-16 PROCEDURE — 63710000001 INSULIN LISPRO (HUMAN) PER 5 UNITS: Performed by: INTERNAL MEDICINE

## 2020-08-16 RX ORDER — TAMSULOSIN HYDROCHLORIDE 0.4 MG/1
0.4 CAPSULE ORAL DAILY
Status: DISCONTINUED | OUTPATIENT
Start: 2020-08-16 | End: 2020-08-16

## 2020-08-16 RX ORDER — TAMSULOSIN HYDROCHLORIDE 0.4 MG/1
0.4 CAPSULE ORAL DAILY
Status: DISCONTINUED | OUTPATIENT
Start: 2020-08-16 | End: 2020-08-19 | Stop reason: HOSPADM

## 2020-08-16 RX ADMIN — RIVAROXABAN 15 MG: 15 TABLET, FILM COATED ORAL at 18:16

## 2020-08-16 RX ADMIN — INSULIN DETEMIR 10 UNITS: 100 INJECTION, SOLUTION SUBCUTANEOUS at 20:08

## 2020-08-16 RX ADMIN — INSULIN LISPRO 5 UNITS: 100 INJECTION, SOLUTION INTRAVENOUS; SUBCUTANEOUS at 18:17

## 2020-08-16 RX ADMIN — INSULIN LISPRO 3 UNITS: 100 INJECTION, SOLUTION INTRAVENOUS; SUBCUTANEOUS at 11:28

## 2020-08-16 RX ADMIN — HYDROCODONE BITARTRATE AND ACETAMINOPHEN 1 TABLET: 5; 325 TABLET ORAL at 04:07

## 2020-08-16 RX ADMIN — TAMSULOSIN HYDROCHLORIDE 0.4 MG: 0.4 CAPSULE ORAL at 11:28

## 2020-08-16 RX ADMIN — ATENOLOL 50 MG: 50 TABLET ORAL at 08:13

## 2020-08-16 RX ADMIN — INSULIN LISPRO 2 UNITS: 100 INJECTION, SOLUTION INTRAVENOUS; SUBCUTANEOUS at 20:09

## 2020-08-16 RX ADMIN — HYDROCODONE BITARTRATE AND ACETAMINOPHEN 1 TABLET: 5; 325 TABLET ORAL at 14:21

## 2020-08-16 RX ADMIN — SODIUM CHLORIDE, PRESERVATIVE FREE 10 ML: 5 INJECTION INTRAVENOUS at 20:09

## 2020-08-16 RX ADMIN — CEFTRIAXONE SODIUM 1 G: 1 INJECTION, POWDER, FOR SOLUTION INTRAMUSCULAR; INTRAVENOUS at 23:07

## 2020-08-16 RX ADMIN — SODIUM CHLORIDE, PRESERVATIVE FREE 10 ML: 5 INJECTION INTRAVENOUS at 08:13

## 2020-08-16 RX ADMIN — INSULIN LISPRO 3 UNITS: 100 INJECTION, SOLUTION INTRAVENOUS; SUBCUTANEOUS at 08:13

## 2020-08-16 RX ADMIN — INSULIN LISPRO 2 UNITS: 100 INJECTION, SOLUTION INTRAVENOUS; SUBCUTANEOUS at 18:17

## 2020-08-16 RX ADMIN — INSULIN LISPRO 5 UNITS: 100 INJECTION, SOLUTION INTRAVENOUS; SUBCUTANEOUS at 11:28

## 2020-08-16 NOTE — PLAN OF CARE
Problem: Patient Care Overview  Goal: Plan of Care Review  Outcome: Ongoing (interventions implemented as appropriate)  Flowsheets (Taken 8/16/2020 1841)  Progress: no change  Note:   Pt alert to self only this shift.  Pt calm/cooperative.  VSS, some HTN noted, on 2L O2. Afib on tele.  Right hip dylan drsng CDI. Abd. pillow in use. Only requested pain medicine once this shift. Pedal pulses weak. Pt voided once this shift but is retaining, flomax started. Multiple loose BMs this shift. Frequent turning, on waffle mattress. Heparin drip d/c- started on Xarelto.

## 2020-08-16 NOTE — PROGRESS NOTES
HEPARIN INFUSION  Analilia De Leon is a  85 y.o. female receiving heparin infusion.     Therapy for (VTE/Cardiac):   PE  Patient Weight: 59 kg  Initial Bolus (Y/N):   Y  Any Bolus (Y/N):   Y    Signs or Symptoms of Bleeding: Patien't H&H has dropped and she had to be tranfused with blood yesterday. Per  This is likely christine-operative blood loss and continue with heparin gtt until HgB stable for 4 hours before transitioning to Xarelto.      VTE (PE/DVT)   Initial Bolus: 80 units/kg (Max 10,000 units)  Initial rate: 18 units/kg/hr (Max 1,500 units/hr)      (Anti-Xa) (IU/mL) Bolus Dose Stop Infusion Rate Change Repeat (Anti-Xa)     ? 0.19 80 units/kg 0 hrs Increase rate by   4 units/kg/hr 6 hrs      0.2 - 0.29 40 units/kg 0 hrs Increase rate by 2 units/kg/hr 6 hrs    0.3 - 0.7  0 0 hrs No change 6 hrs      0.71 - 0.99   0  0 hrs Decrease rate by 2 units/kg/hr 6 hrs      ? 1 0 Hold 1 hr Decrease rate by 3 units/kg/hr 6 hrs        Results from last 7 days   Lab Units 08/15/20  0615 08/14/20  0154 08/13/20  1241  08/11/20 2028   INR   --   --  1.48*  --  1.28*   HEMOGLOBIN g/dL 6.8* 7.3* 8.1*   < >  --    HEMATOCRIT % 23.4* 25.4* 27.1*   < >  --    PLATELETS 10*3/mm3 180 163 159   < >  --     < > = values in this interval not displayed.          Date   Time   Anti-Xa Current Rate (Unit/kg/hr) Bolus   (Units) Rate Change   (Unit/kg/hr) New Rate (Unit/kg/hr) Next   Anti-Xa Comments  Pump Check Daily   8/13 1224 0.1 0 4500 18 18 1700 PATRICIA Villarreal    8/13 1818 0.1 18 3000 + 4 22 0200 Spoke with Agueda 4503   8/13 2000 -- -- -- -4 18 0200 RN called back weight on heparin drip entered into pump was 18 kg not 59 kg; will still give above bolus but will  set rate to 18 units/kg/hr   8/14 0154 0.61 18 -- -- 18 0800 Will discuss w/ nurse   8/14 0750 0.67 18 -- -- 18 1400 D/W Yeny    8/14 1336 0.59 18 -- -- 18 8/15 am labs D/W Yeny    8/15 0615 0.45 18 -- -- 18 0600 D/W Yves   8/16 0649 0.31 18 -- -- 18 0600 PATRICIA Eden                                                                                                                                                     Gwendolyn Camp, PharmD  Pharmacy Resident  8/15/2020  14:45

## 2020-08-16 NOTE — THERAPY TREATMENT NOTE
"Patient Name: Analilia De Leon  : 1935    MRN: [id number]                              Today's Date: 2020       Admit Date: 2020    Visit Dx:     ICD-10-CM ICD-9-CM   1. Hyperkalemia E87.5 276.7     Patient Active Problem List   Diagnosis   • Iron deficiency anemia   • Hypertension   • Atrial fibrillation (CMS/HCC)   • Hyperlipidemia   • CAD (coronary artery disease)   • Hip fracture (CMS/HCC)   • Acute UTI (urinary tract infection)   • Altered mental status   • Fall   • Status post hip hemiarthroplasty   • Acute respiratory failure with hypoxia (CMS/HCC)   • Acute pulmonary embolism (CMS/HCC)   • Pulmonary hypertension (CMS/HCC)   • Tricuspid regurgitation     Past Medical History:   Diagnosis Date   • Ataxia 2017   • Atrial fibrillation (CMS/HCC)    • BPPV (benign paroxysmal positional vertigo) 2017   • Chronic anticoagulation    • Facial numbness     takes Keppra for \"facial numbness\"    • History of blood transfusion    • Hypertension    • Microangiopathy (CMS/HCC) 2017   • T2DM (type 2 diabetes mellitus) (CMS/HCC)    • TIA (transient ischemic attack) 3/17/2017   • Weakness 2017     Past Surgical History:   Procedure Laterality Date   • CHOLECYSTECTOMY     • ENDOSCOPY  2015    showed 2 ulcerated lesions in the gastric antrum   • HIP HEMIARTHROPLASTY Right 2020    Procedure: HIP BI-POLAR RIGHT;  Surgeon: Ramirez Lopez MD;  Location: [facility];  Service: Orthopedics;  Laterality: Right;   • HYSTERECTOMY     • KNEE SURGERY Right    • OOPHORECTOMY       General Information     Row Name 20 1308          PT Evaluation Time/Intention    Document Type  therapy note (daily note)  -LR     Mode of Treatment  physical therapy;individual therapy  -LR     Row Name 20 1308          General Information    Patient Profile Reviewed?  yes  -LR     Existing Precautions/Restrictions  fall;right;hip, posterior;other (see comments) confusion  -LR     Barriers to Rehab  medically " complex;cognitive status  -LR     Row Name 08/16/20 1308          Cognitive Assessment/Intervention- PT/OT    Orientation Status (Cognition)  oriented to;person;disoriented to;place;situation;time;verbal cues/prompts needed for orientation  -LR     Cognitive Assessment/Intervention Comment  could state name and day/month of birthday but not year; stated she was in [place] [id number]  -LR     Row Name 08/16/20 1308          Safety Issues, Functional Mobility    Safety Issues Affecting Function (Mobility)  ability to follow commands;awareness of need for assistance;insight into deficits/self awareness;judgment;positioning of assistive device;sequencing abilities;safety precautions follow-through/compliance;problem solving;safety precaution awareness  -LR     Impairments Affecting Function (Mobility)  balance;strength;cognition;pain;coordination;endurance/activity tolerance;postural/trunk control;range of motion (ROM)  -LR       User Key  (r) = Recorded By, (t) = Taken By, (c) = Cosigned By    Initials Name Provider Type    LR Natasha Monroe, PT Physical Therapist        Mobility     Row Name 08/16/20 1308          Bed Mobility Assessment/Treatment    Supine-Sit [place] (Bed Mobility)  not tested  -LR     Comment (Bed Mobility)  [facility] on arrival and at end of treatment.   -LR     Row Name 08/16/20 1308          Transfer Assessment/Treatment    Comment (Transfers)  Verbal and manual cues for correct hand placement on armrests of chair for t/f. Repeated cues and assist to pull L foot under her prior to t/f, had to be blocked to keep it there. Stood x2 from chair with RW in front but patient stood with forward flexed posture, hips shifted posterior, shoulders forward and would not correct despite repeated cues for correction and would not reach up and grab RW. Discontinued use of RW and performed third stand with support at gait belt and B UE support. Achieved higher stand on third stand but still unable to obtain  adequate static standing balance to attempt forward ambulation.   -LR     Row Name 08/16/20 1308          Bed-Chair Transfer    Bed-Chair [place] (Transfers)  not tested [facility] on arrival  -LR     Row Name 08/16/20 1308          Sit-Stand Transfer    Sit-Stand [place] (Transfers)  verbal cues;maximum assist (25% patient effort);2 person assist  -LR     Assistive Device (Sit-Stand Transfers)  other (see comments) B UE support, support at gait belt  -LR     Row Name 08/16/20 1308          Gait/Stairs Assessment/Training    [place] Level (Gait)  unable to assess;not tested  -LR     Comment (Gait/Stairs)  Patient is non-ambulatory at this time.   -LR     Row Name 08/16/20 1308          Mobility Assessment/Intervention    Extremity Weight-bearing Status  right lower extremity  -LR     Right Lower Extremity (Weight-bearing Status)  weight-bearing as tolerated (WBAT)  -LR       User Key  (r) = Recorded By, (t) = Taken By, (c) = Cosigned By    Initials Name Provider Type    LR Natasha Monroe, PT Physical Therapist        Obj/Interventions     Row Name 08/16/20 1308          Therapeutic Exercise    Lower Extremity (Therapeutic Exercise)  heel slides, right;LAQ (long arc quad), right;quad sets, right;SAQ (short arc quad), right;SLR (straight leg raise), right  -LR     Lower Extremity Range of Motion (Therapeutic Exercise)  hip abduction/adduction, right;ankle dorsiflexion/plantar flexion, bilateral  -LR     Exercise Type (Therapeutic Exercise)  AAROM (active assistive range of motion);isometric contraction, static;isotonic contraction, concentric;PROM (passive range of motion)  -LR     Position (Therapeutic Exercise)  seated  -LR     Sets/Reps (Therapeutic Exercise)  x10 reps each  -LR     Comment (Therapeutic Exercise)  cues for technique; patient had significant difficulty following commands for isometrics, attempted glute sets but patient could not comprehend, required repeated cues to assist with ther  ex.   -LR     Row Name 08/16/20 1308          Static Sitting Balance    Level of [place] (Unsupported Sitting, Static Balance)  contact guard assist  -LR     Sitting Position (Unsupported Sitting, Static Balance)  sitting in chair  -LR     Time Able to Maintain Position (Unsupported Sitting, Static Balance)  more than 5 minutes  -LR     Row Name 08/16/20 1308          Static Standing Balance    Level of [place] (Supported Standing, Static Balance)  maximal assist, 25 to 49% patient effort;2 person assist  -LR     Time Able to Maintain Position (Supported Standing, Static Balance)  15 to 30 seconds  -LR     Assistive Device Utilized (Supported Standing, Static Balance)  walker, rolling;other (see comments) B UE support, support at gait belt  -LR     Comment (Supported Standing, Static Balance)  performed x3  -LR       User Key  (r) = Recorded By, (t) = Taken By, (c) = Cosigned By    Initials Name Provider Type    LR Natasha Monroe, PT Physical Therapist        Goals/Plan     Row Name 08/16/20 1304          Bed Mobility Goal 1 (PT)    Activity/Assistive Device (Bed Mobility Goal 1, PT)  sit to supine/supine to sit  -LR     [place] Level/Cues Needed (Bed Mobility Goal 1, PT)  moderate assist (50-74% patient effort);2 person assist  -LR     Time Frame (Bed Mobility Goal 1, PT)  long term goal (LTG);5 days  -LR     Progress/Outcomes (Bed Mobility Goal 1, PT)  continuing progress toward goal;goal ongoing  -LR     Row Name 08/16/20 1309          Transfer Goal 1 (PT)    Activity/Assistive Device (Transfer Goal 1, PT)  sit-to-stand/stand-to-sit;walker, rolling;bed-to-chair/chair-to-bed  -LR     [place] Level/Cues Needed (Transfer Goal 1, PT)  moderate assist (50-74% patient effort);2 person assist  -LR     Time Frame (Transfer Goal 1, PT)  long term goal (LTG);5 days  -LR     Progress/Outcome (Transfer Goal 1, PT)  continuing progress toward goal;goal ongoing  -LR     Row Name 08/16/20 1306           Gait Training Goal 1 (PT)    Activity/Assistive Device (Gait Training Goal 1, PT)  gait (walking locomotion);walker, rolling  -LR     [place] Level (Gait Training Goal 1, PT)  moderate assist (50-74% patient effort);2 person assist  -LR     Distance (Gait Goal 1, PT)  10 feet  -LR     Time Frame (Gait Training Goal 1, PT)  long term goal (LTG);5 days  -LR     Progress/Outcome (Gait Training Goal 1, PT)  progress slower than expected;goal ongoing  -LR       User Key  (r) = Recorded By, (t) = Taken By, (c) = Cosigned By    Initials Name Provider Type    LR Natasha Monroe, PT Physical Therapist        Clinical Impression     Row Name 08/16/20 1308          Pain Assessment    Additional Documentation  Pain Scale: Numbers Pre/Post-Treatment (Group)  -LR     Row Name 08/16/20 1308          Pain Scale: Numbers Pre/Post-Treatment    Pain Scale: Numbers, Pretreatment  0/10 - no pain  -LR     Pain Scale: Numbers, Post-Treatment  0/10 - no pain  -LR     Pain Location - Side  Right  -LR     Pain Location  hip  -LR     Pain Intervention(s)  Ambulation/increased activity;Repositioned;Cold applied  -LR     Row Name 08/16/20 1308          Plan of Care Review    Plan of Care Reviewed With  patient;son  -LR     Progress  improving  -LR     Row Name 08/16/20 1308          Physical Therapy Clinical Impression    Criteria for Skilled Interventions Met (PT Clinical Impression)  yes;treatment indicated  -LR     Rehab Potential (PT Clinical Summary)  fair, will monitor progress closely  -LR     Row Name 08/16/20 1308          Positioning and Restraints    Pre-Treatment Position  sitting in chair/recliner  -LR     Post Treatment Position  chair  -LR     In Chair  notified nsg;reclined;sitting;call light within reach;encouraged to call for assist;exit alarm on;with family/caregiver;legs elevated;on mechanical lift sling;compression device  -LR       User Key  (r) = Recorded By, (t) = Taken By, (c) = Cosigned By    Initials  Name Provider Type    Natasha Whitfield, PT Physical Therapist        Outcome Measures     Row Name 08/16/20 1308          How much help from another person do you currently need...    Turning from your back to your side while in flat bed without using bedrails?  1  -LR     Moving from lying on back to sitting on the side of a flat bed without bedrails?  1  -LR     Moving to and from a bed to a chair (including a wheelchair)?  2  -LR     Standing up from a chair using your arms (e.g., wheelchair, bedside chair)?  2  -LR     Climbing 3-5 steps with a railing?  1  -LR     To walk in hospital room?  1  -LR     AM-PAC 6 Clicks Score (PT)  8  -LR     Row Name 08/16/20 1308          Functional Assessment    Outcome Measure Options  AM-PAC 6 Clicks Basic Mobility (PT)  -LR       User Key  (r) = Recorded By, (t) = Taken By, (c) = Cosigned By    Initials Name Provider Type    Natasha Whitfield, PT Physical Therapist        Physical Therapy Education                 Title: PT OT SLP Therapies (In Progress)     Topic: Physical Therapy (In Progress)     Point: Mobility training (In Progress)     Description:   Instruct learner(s) on safety and technique for assisting patient out of bed, chair or wheelchair.  Instruct in the proper use of assistive devices, such as walker, crutches, cane or brace.              Patient Friendly Description:   It's important to get you on your feet again, but we need to do so in a way that is safe for you. Falling has serious consequences, and your personal safety is the most important thing of all.        When it's time to get out of bed, one of us or a family member will sit next to you on the bed to give you support.     If your doctor or nurse tells you to use a walker, crutches, a cane, or a brace, be sure you use it every time you get out of bed, even if you think you don't need it.    Learning Progress Summary           Patient Acceptance, E,D, NR by LR at 8/16/2020 1302     Comment:  Educated on posterior hip precautions, weight bearing status, correct sit<->stand t/f technique, correct posture, HEP, and progression of POC.    Acceptance, E, NR by AS at 8/15/2020 1036    Acceptance, E, NR by SC at 8/14/2020 1458    Comment:  reviewed HEP    Acceptance, E,D, VU,NR by LR at 8/13/2020 1528    Comment:  Educated on posterior hip precautions, weight bearing status, correct supine to sit t/f technique, correct sit<->stand t/f technique, safety with mobility, and progression of POC.   Family Acceptance, E,D, NR by LR at 8/16/2020 1308    Comment:  Educated on posterior hip precautions, weight bearing status, correct sit<->stand t/f technique, correct posture, HEP, and progression of POC.    Acceptance, E, NR by SC at 8/14/2020 1458    Comment:  reviewed HEP    Acceptance, E,D, VU,NR by LR at 8/13/2020 1528    Comment:  Educated on posterior hip precautions, weight bearing status, correct supine to sit t/f technique, correct sit<->stand t/f technique, safety with mobility, and progression of POC.                   Point: Home exercise program (In Progress)     Description:   Instruct learner(s) on appropriate technique for monitoring, assisting and/or progressing patient with therapeutic exercises and activities.              Learning Progress Summary           Patient Acceptance, E,D, NR by LR at 8/16/2020 1308    Comment:  Educated on posterior hip precautions, weight bearing status, correct sit<->stand t/f technique, correct posture, HEP, and progression of POC.    Acceptance, E, NR by AS at 8/15/2020 1036    Acceptance, E, NR by SC at 8/14/2020 1458    Comment:  reviewed HEP    Acceptance, E,D, VU,NR by LR at 8/13/2020 1528    Comment:  Educated on posterior hip precautions, weight bearing status, correct supine to sit t/f technique, correct sit<->stand t/f technique, safety with mobility, and progression of POC.   Family Acceptance, E,D, NR by LR at 8/16/2020 1308    Comment:  Educated on  posterior hip precautions, weight bearing status, correct sit<->stand t/f technique, correct posture, HEP, and progression of POC.    Acceptance, E, NR by SC at 8/14/2020 1458    Comment:  reviewed HEP    Acceptance, E,D, VU,NR by LR at 8/13/2020 1528    Comment:  Educated on posterior hip precautions, weight bearing status, correct supine to sit t/f technique, correct sit<->stand t/f technique, safety with mobility, and progression of POC.                   Point: Body mechanics (In Progress)     Description:   Instruct learner(s) on proper positioning and spine alignment for patient and/or caregiver during mobility tasks and/or exercises.              Learning Progress Summary           Patient Acceptance, E,D, NR by LR at 8/16/2020 1308    Comment:  Educated on posterior hip precautions, weight bearing status, correct sit<->stand t/f technique, correct posture, HEP, and progression of POC.    Acceptance, E, NR by AS at 8/15/2020 1036    Acceptance, E, NR by SC at 8/14/2020 1458    Comment:  reviewed HEP    Acceptance, E,D, VU,NR by LR at 8/13/2020 1528    Comment:  Educated on posterior hip precautions, weight bearing status, correct supine to sit t/f technique, correct sit<->stand t/f technique, safety with mobility, and progression of POC.   Family Acceptance, E,D, NR by LR at 8/16/2020 1308    Comment:  Educated on posterior hip precautions, weight bearing status, correct sit<->stand t/f technique, correct posture, HEP, and progression of POC.    Acceptance, E, NR by SC at 8/14/2020 1458    Comment:  reviewed HEP    Acceptance, E,D, VU,NR by LR at 8/13/2020 1528    Comment:  Educated on posterior hip precautions, weight bearing status, correct supine to sit t/f technique, correct sit<->stand t/f technique, safety with mobility, and progression of POC.                   Point: Precautions (In Progress)     Description:   Instruct learner(s) on prescribed precautions during mobility and gait tasks               Learning Progress Summary           Patient Acceptance, E,D, NR by LR at 8/16/2020 1308    Comment:  Educated on posterior hip precautions, weight bearing status, correct sit<->stand t/f technique, correct posture, HEP, and progression of POC.    Acceptance, E, NR by AS at 8/15/2020 1036    Acceptance, E, NR by SC at 8/14/2020 1458    Comment:  reviewed HEP    Acceptance, E,D, VU,NR by LR at 8/13/2020 1528    Comment:  Educated on posterior hip precautions, weight bearing status, correct supine to sit t/f technique, correct sit<->stand t/f technique, safety with mobility, and progression of POC.   Family Acceptance, E,D, NR by LR at 8/16/2020 1308    Comment:  Educated on posterior hip precautions, weight bearing status, correct sit<->stand t/f technique, correct posture, HEP, and progression of POC.    Acceptance, E, NR by SC at 8/14/2020 1458    Comment:  reviewed HEP    Acceptance, E,D, VU,NR by LR at 8/13/2020 1528    Comment:  Educated on posterior hip precautions, weight bearing status, correct supine to sit t/f technique, correct sit<->stand t/f technique, safety with mobility, and progression of POC.                               User Key     Initials Effective Dates Name Provider Type Discipline    SC 06/19/15 -  Maykel Matthew, PT Physical Therapist PT    LR 06/19/15 -  Natasha Monroe, PT Physical Therapist PT    AS 06/22/15 -  Celina Jimenez, PTA Physical Therapy Assistant PT              PT Recommendation and Plan  Planned Therapy Interventions (PT Eval): balance training, bed mobility training, gait training, home exercise program, ROM (range of motion), patient/family education, strengthening, transfer training  Outcome Summary/Treatment Plan (PT)  Anticipated Equipment Needs at Discharge (PT): other (see comments)(TBD)  Anticipated Discharge Disposition (PT): skilled nursing facility  Plan of Care Reviewed With: patient, son  Progress: improving  Outcome Summary: Patient stood from chair  x3 with max assist x2. Patient unable to achieve full upright standing d/t forward flexed posture with hips shifted posteriorly and shoulders forward. Patient could not correct despite repeated verbal and tactile cues for correction. Unable to progress to forward ambulation d/t patient's inability to obtain adequate static standing balance. Patient more alert today, still with some confusion and impaired ability to follow commands/cues. Will continue to progress mobility training and strengthening as able. Recommend OT consult for ADL and adaptive equipment training.     Time Calculation:   PT Charges     Row Name 08/16/20 1308             Time Calculation    Start Time  1308  -LR      PT Received On  08/16/20  -LR      PT Goal Re-Cert Due Date  08/23/20  -LR         Time Calculation- PT    Total Timed Code Minutes- PT  17 minute(s)  -LR         Timed Charges    95881 - PT Therapeutic Exercise Minutes  6  -LR      22779 - PT Therapeutic Activity Minutes  11  -LR        User Key  (r) = Recorded By, (t) = Taken By, (c) = Cosigned By    Initials Name Provider Type    LR Natasha Monroe, PT Physical Therapist        Therapy Charges for Today     Code Description Service Date Service Provider Modifiers Qty    98857595480 HC PT THERAPEUTIC ACT EA 15 MIN 8/16/2020 Natasha Monroe, PT GP 1    33345919040 HC PT THER SUPP EA 15 MIN 8/16/2020 Natasha Monroe, PT GP 2          PT G-Codes  Outcome Measure Options: AM-PAC 6 Clicks Basic Mobility (PT)  AM-PAC 6 Clicks Score (PT): 8    Natasha Monroe PT  8/16/2020

## 2020-08-16 NOTE — PLAN OF CARE
Problem: Patient Care Overview  Goal: Plan of Care Review  Outcome: Ongoing (interventions implemented as appropriate)  Flowsheets (Taken 8/16/2020 7857)  Outcome Summary: Patient stood from chair x3 with max assist x2. Patient unable to achieve full upright standing d/t forward flexed posture with hips shifted posteriorly and shoulders forward. Patient could not correct despite repeated verbal and tactile cues for correction. Unable to progress to forward ambulation d/t patient's inability to obtain adequate static standing balance. Patient more alert today, still with some confusion and impaired ability to follow commands/cues. Will continue to progress mobility training and strengthening as able. Recommend OT consult for ADL and adaptive equipment training.

## 2020-08-16 NOTE — PLAN OF CARE
Problem: Patient Care Overview  Goal: Plan of Care Review  Outcome: Ongoing (interventions implemented as appropriate)  Flowsheets (Taken 8/16/2020 0402)  Progress: no change  Plan of Care Reviewed With: patient   Pt alert to self only this shift.  Pt cooperative and pleasant.  Vss.  Right hip dressing intact. Abd pillow in use.  Denies numbness or tingling.  Pedal pulses dopplered. Pt unable to void.  In and out cathed with 550cc's noted.  Tolerated well.  Cont to turn every 2 hours.

## 2020-08-16 NOTE — PROGRESS NOTES
[facility]  PROGRESS NOTE    Patient Name: Analilia De Leon  : 1935  MRN: [id number]    Date of Admission: 2020  Primary Care Physician: Sravan Franco APRN    Subjective   Subjective     CC:  Right hip fracture    HPI:  Pt seen and examined. Nursing notes reviewed. Pt more alert today but still very confused.     Review of Systems  Gen- No fevers, chills  CV- No chest pain, palpitations  Resp- No cough, dyspnea  GI- No N/V/D, abd pain    Objective   Objective     Vital Signs:   Temp:  [97.5 °F (36.4 °C)-98.8 °F (37.1 °C)] 97.7 °F (36.5 °C)  Heart Rate:  [78-98] 89  Resp:  [14-18] 16  BP: (130-167)/(60-88) 149/73        Physical Exam:  Constitutional: No acute distress, awake, interactive  HENT: NCAT, mucous membranes moist  Respiratory: Clear to auscultation bilaterally, respiratory effort normal   Cardiovascular: irregularly irregular with rates in the mid 80s, appears afib on tele,  no murmurs, rubs, or gallops, palpable pedal pulses bilaterally  Gastrointestinal: Positive bowel sounds, soft, nontender, nondistended  Musculoskeletal: No bilateral ankle edema, RLE wound site c/d/i  Psychiatric: Appropriate affect, cooperative  Neurologic: alert, unable to answer questions appropriately this am still confused  Skin: No rashes    Results Reviewed:  Results from last 7 days   Lab Units 08/15/20  0615 20  0154 20  1241  20   WBC 10*3/mm3 13.71* 16.29* 17.02*   < >  --    HEMOGLOBIN g/dL 6.8* 7.3* 8.1*   < >  --    HEMATOCRIT % 23.4* 25.4* 27.1*   < >  --    PLATELETS 10*3/mm3 180 163 159   < >  --    INR   --   --  1.48*  --  1.28*   PROCALCITONIN ng/mL  --   --   --   --  0.07    < > = values in this interval not displayed.     Results from last 7 days   Lab Units 20  0649 08/15/20  0615 20  0507  20   SODIUM mmol/L 136 141 137   < > 138   POTASSIUM mmol/L 4.1 4.2 5.5*   < > 5.6*   CHLORIDE mmol/L 100 108* 109*    < > 106   CO2 mmol/L 23.0 22.0 19.0*   < > 21.0*   BUN mg/dL 33* 36* 21   < > 13   CREATININE mg/dL 0.88 1.03* 1.03*   < > 0.99   GLUCOSE mg/dL 205* 146* 197*   < > 178*   CALCIUM mg/dL 8.6 8.6 8.6   < > 9.2   ALT (SGPT) U/L  --   --   --   --  15   AST (SGOT) U/L  --   --   --   --  23   TROPONIN T ng/mL  --   --  <0.010  --  <0.010   PROBNP pg/mL  --   --  9,136.0*  --   --     < > = values in this interval not displayed.     Estimated Creatinine Clearance: 38.6 mL/min (by C-G formula based on SCr of 0.88 mg/dL).    Microbiology Results Abnormal     Procedure Component Value - Date/Time    Blood Culture - Blood, Hand, Left [[id number]] Collected:  08/11/20 2054    Lab Status:  Preliminary result Specimen:  Blood from Hand, Left Updated:  08/16/20 0815     Blood Culture No growth at 4 days    Blood Culture - Blood, Hand, Left [[id number]] Collected:  08/12/20 0013    Lab Status:  Preliminary result Specimen:  Blood from Hand, Left Updated:  08/16/20 0015     Blood Culture No growth at 4 days    MRSA Screen, PCR (Inpatient) - Swab, Nares [[id number]]  (Normal) Collected:  08/14/20 1117    Lab Status:  Final result Specimen:  Swab from Nares Updated:  08/14/20 1252     MRSA PCR Negative    Narrative:       MRSA Negative    Urine Culture - Urine, Urine, Catheter In/Out [[id number]]  (Normal) Collected:  08/13/20 0627    Lab Status:  Final result Specimen:  Urine, Catheter In/Out Updated:  08/14/20 0725     Urine Culture No growth    Urine Culture - Urine, Urine, Catheter In/Out [[id number]]  (Normal) Collected:  08/12/20 0138    Lab Status:  Final result Specimen:  Urine, Catheter In/Out Updated:  08/13/20 1309     Urine Culture No growth    COVID PRE-OP / PRE-PROCEDURE SCREENING ORDER (NO ISOLATION) - Swab, Nasopharynx [[id number]] Collected:  08/12/20 0038    Lab Status:  Final result Specimen:  Swab from Nasopharynx Updated:  08/12/20 0142    Narrative:       The following orders were created for panel order COVID  PRE-OP / PRE-PROCEDURE SCREENING ORDER (NO ISOLATION) - Swab, Nasopharynx.  Procedure                               Abnormality         Status                     ---------                               -----------         ------                     COVID-19, [facility]OTT IN-HOUS...[[id number]]  Normal              Final result                 Please view results for these tests on the individual orders.    COVID-19, [facility]BBOTT IN-HOUSE,NP Swab (NO TRANSPORT MEDIA) 2 HR TAT - Swab, Nasopharynx [[id number]]  (Normal) Collected:  08/12/20 0038    Lab Status:  Final result Specimen:  Swab from Nasopharynx Updated:  08/12/20 0142     COVID19 Not Detected    Narrative:       Fact sheet for providers: https://www.fda.gov/media/048210/download     Fact sheet for patients: https://www.fda.gov/media/767693/download          Imaging Results (Last 24 Hours)     ** No results found for the last 24 hours. **          Results for orders placed during the hospital encounter of 08/11/20   Adult Transthoracic Echo Limited W/ Cont if Necessary Per Protocol    Narrative · Left ventricular systolic function is normal. Estimated EF = 50%.  · Severe biatrial enlargement.  · Severe tricuspid valve regurgitation is present.  · Severe pulmonary hypertension is present. Estimated right ventricular   systolic pressure from tricuspid regurgitation is markedly elevated (83   mmHg).          I have reviewed the medications:  Scheduled Meds:    atenolol 50 mg Oral Q24H   cefTRIAXone 1 g Intravenous Q24H   insulin lispro 0-7 Units Subcutaneous 4x Daily With Meals & Nightly   sodium chloride 10 mL Intravenous Q12H   sodium zirconium cyclosilicate 10 g Oral Once     Continuous Infusions:    dilTIAZem 5-15 mg/hr Last Rate: Stopped (08/13/20 1700)   heparin 18 Units/kg/hr Last Rate: 18 Units/kg/hr (08/15/20 1531)   lactated ringers 9 mL/hr Last Rate: 9 mL/hr (08/12/20 1510)   Pharmacy to Dose Heparin       PRN Meds:.•  acetaminophen  •  acetaminophen  •   bisacodyl  •  dextrose  •  dextrose  •  glucagon (human recombinant)  •  HYDROcodone-acetaminophen  •  lactated ringers  •  magnesium hydroxide  •  Morphine  •  naloxone  •  Pharmacy to Dose Heparin  •  promethazine  •  sodium chloride    Assessment/Plan   Assessment & Plan     Active Hospital Problems    Diagnosis  POA   • **Hip fracture (CMS/HCC) [S72.009A]  Yes     Priority: High   • Acute pulmonary embolism (CMS/HCC) [I26.99]  Unknown     Priority: High   • Pulmonary hypertension (CMS/HCC) [I27.20]  Unknown     Priority: High   • Tricuspid regurgitation [I07.1]  Unknown     Priority: High   • Acute respiratory failure with hypoxia (CMS/HCC) [J96.01]  Unknown     Priority: High   • Status post hip hemiarthroplasty [Z96.649]  Not Applicable     Priority: High   • Altered mental status [R41.82]  Yes     Priority: High   • Acute UTI (urinary tract infection) [N39.0]  Yes     Priority: Medium   • Fall [W19.XXXA]  Unknown     Priority: Medium   • Atrial fibrillation (CMS/HCC) [I48.91]  Yes     Priority: Medium   • Hyperlipidemia [E78.5]  Yes     Priority: Low   • Hypertension [I10]  Yes     Priority: Low   • CAD (coronary artery disease) [I25.10]  Yes     Priority: Low   • Iron deficiency anemia [D50.9]  Yes      Resolved Hospital Problems   No resolved problems to display.        Brief Hospital Course to date:  Analilia De Leon is a 85 y.o. female with h/o CAD, HTN, T2DM, HLD as well as pAF on xarelto who presented as a transfer from OSH due to acute right sided hip fracture. Post-op, pt had acute hypoxic respiratory failure initially requiring nonrebreather and pt had AMS that improved with IV narcan.     Plan:    Right hip fracture due to mechanical fall  --s/p OR with Dr. Lopez POD 4  -symptom mgt; PRN Morphine pain control  -case mgt consult   -CT head unremarkable  -COVID-19 pre-procedure swab NEGATIVE    Acute hypoxic respiratory failure  Acute PE  Flash pulmonary edema  Severe Pulmonary HTN  --proBNP 9,000 suspect  "flash pulm edema due to Afib with RVR  --monitor volume status. Defer Lasix for today as she appears euvolemic on my exam  --TTE done to assess LVEF and to evaluate for right heart strain.  Normal EF with severe pulmonary HTN, tricuspid regurg with RVSP of 83.  Consulted Cardiology for mgmt.   --will stop heparin gtt as CBC is stable/improved now. Transition back to Xarelto but will do 20mg instead of her prior dose of 10mg given active thrombosis/PE.     Sepsis (tachycardic, leukocytosis)- resolved  --likely due to aspiration event vs UTI which was POA  --blood cultures from admission NGTD  --UCx as below  --continue IV Rocephin for now,stopped Vanc as MRSA PCR was negative  --leukocytosis improving     Altered mental status  -per family pt became \"mildly\" confused after IV Narcotics given at OSH  - initial CT head unremarkable, repeat CT head unremarkable  --ABG with only hypoxia  --improving, suspect this was all due to sedation during OR. Remains confused but is improving daily     Ecoli UTI  -per OSH UA (+nitrites, 2+ bacteria, WBC 30)  -started on IV Rocephin, Reviewed cultures from there, appears to be E coli which was pan-susceptible. D5 of ABX  -repeat blood and UA/urine cultures NGTD.  UCx here may be of little value due to antibiotic modification  -lactic and procal wnl     T2DM  -hem a1c 6.8%  -FSBG q ac/hs w/ LDSS  --will add basal/bolus schedule today as pt is eating now and FSBS have been high  -hold routine metformin      PAF  -CHADSVASC 5  -on routine Xarelto; held for surgery (last dose Sunday, 8/9)   --continue rate controlling meds- on Atenolol at home per med rec  --started Dilt gtt, will leave order as is in case she were to go back into Afib with RVR      HTN/HLD  --resumed home meds    Acute Blood Loss Anemia  --s/p 2 units PRBCs on 8/15 for HgB of 6.8. Transition back to Xarelto as mentioned above        DVT prophylaxis:  anticoagulated         Disposition: I expect the patient to be " discharged tbd, will have PT/OT reassess now that her mental status is improving    CODE STATUS:   Code Status and Medical Interventions:   Ordered at: 08/12/20 2020     Code Status:    CPR     Medical Interventions (Level of Support Prior to Arrest):    Full         Electronically signed by Becka Peres MD, 08/16/20, 08:47.

## 2020-08-16 NOTE — PROGRESS NOTES
"Analilia CARCAMO Moises  [id number]  1935    /73   Pulse 96   Temp 97.7 °F (36.5 °C) (Oral)   Resp 16   Ht 154.9 cm (61\")   Wt 59 kg (130 lb)   SpO2 97%   BMI 24.56 kg/m²     Lab Results (last 24 hours)     Procedure Component Value Units Date/Time    CBC & Differential [[id number]] Collected:  08/16/20 0649    Specimen:  Blood Updated:  08/16/20 0859    Narrative:       The following orders were created for panel order CBC & Differential.  Procedure                               Abnormality         Status                     ---------                               -----------         ------                     CBC Auto Differential[[id number]]        Abnormal            Final result                 Please view results for these tests on the individual orders.    CBC Auto Differential [[id number]]  (Abnormal) Collected:  08/16/20 0649    Specimen:  Blood Updated:  08/16/20 0859     WBC 12.25 10*3/mm3      RBC 3.44 10*6/mm3      Hemoglobin 9.6 g/dL      Hematocrit 31.5 %      MCV 91.6 fL      MCH 27.9 pg      MCHC 30.5 g/dL      RDW 15.4 %      RDW-SD 50.9 fl      MPV 11.7 fL      Platelets 187 10*3/mm3      Neutrophil % 75.8 %      Lymphocyte % 5.1 %      Monocyte % 11.7 %      Eosinophil % 5.7 %      Basophil % 0.4 %      Immature Grans % 1.3 %      Neutrophils, Absolute 9.28 10*3/mm3      Lymphocytes, Absolute 0.63 10*3/mm3      Monocytes, Absolute 1.43 10*3/mm3      Eosinophils, Absolute 0.70 10*3/mm3      Basophils, Absolute 0.05 10*3/mm3      Immature Grans, Absolute 0.16 10*3/mm3      nRBC 0.4 /100 WBC     Basic Metabolic Panel [[id number]]  (Abnormal) Collected:  08/16/20 0649    Specimen:  Blood Updated:  08/16/20 0818     Glucose 205 mg/dL      BUN 33 mg/dL      Creatinine 0.88 mg/dL      Sodium 136 mmol/L      Potassium 4.1 mmol/L      Chloride 100 mmol/L      CO2 23.0 mmol/L      Calcium 8.6 mg/dL      eGFR Non African Amer 61 mL/min/1.73      BUN/Creatinine Ratio 37.5     Anion Gap 13.0 mmol/L     " Narrative:       GFR Normal >60  Chronic Kidney Disease <60  Kidney Failure <15      Blood Culture - Blood, Hand, Left [[id number]] Collected:  08/11/20 2054    Specimen:  Blood from Hand, Left Updated:  08/16/20 0815     Blood Culture No growth at 4 days    Heparin Anti-Xa [[id number]]  (Normal) Collected:  08/16/20 0649    Specimen:  Blood Updated:  08/16/20 0757     Heparin Anti-Xa (UFH) 0.31 IU/ml     POC Glucose Once [[id number]]  (Abnormal) Collected:  08/16/20 0724    Specimen:  Blood Updated:  08/16/20 0726     Glucose 218 mg/dL     Blood Culture - Blood, Hand, Left [[id number]] Collected:  08/12/20 0013    Specimen:  Blood from Hand, Left Updated:  08/16/20 0015     Blood Culture No growth at 4 days    POC Glucose Once [[id number]]  (Abnormal) Collected:  08/15/20 2013    Specimen:  Blood Updated:  08/15/20 2016     Glucose 202 mg/dL     POC Glucose Once [[id number]]  (Abnormal) Collected:  08/15/20 1628    Specimen:  Blood Updated:  08/15/20 1640     Glucose 177 mg/dL     POC Glucose Once [[id number]]  (Abnormal) Collected:  08/15/20 1554    Specimen:  Blood Updated:  08/15/20 1555     Glucose 188 mg/dL     POC Glucose Once [[id number]]  (Abnormal) Collected:  08/15/20 1047    Specimen:  Blood Updated:  08/15/20 1049     Glucose 209 mg/dL           Patient Care Team:  Sravan Franco APRN as PCP - General (Family Medicine)    SUBJECTIVE  Pain appears well controlled.  Physical therapy limited to in bed activity only.    PHYSICAL EXAM  Alert no acute distress.  Incision benign with no accumulated drainage.  Minimal thigh swelling  Neurovascularly intact to knees and toes       Hip fracture (CMS/HCC)    Iron deficiency anemia    Hypertension    Atrial fibrillation (CMS/HCC)    Hyperlipidemia    CAD (coronary artery disease)    Acute UTI (urinary tract infection)    Altered mental status    Fall    Status post hip hemiarthroplasty    Acute respiratory failure with hypoxia (CMS/HCC)    Acute pulmonary embolism  (CMS/HCC)    Pulmonary hypertension (CMS/HCC)    Tricuspid regurgitation      PLAN / DISPOSITION:  Hemoglobin 9.6 today.    Discharge to SNF likely in 2 to 3 days (pending approval).    Dr. Hercules covering for Dr. John Hercules MD  08/16/20  09:50

## 2020-08-17 LAB
ANION GAP SERPL CALCULATED.3IONS-SCNC: 8 MMOL/L (ref 5–15)
BACTERIA SPEC AEROBE CULT: NORMAL
BACTERIA SPEC AEROBE CULT: NORMAL
BASOPHILS # BLD AUTO: 0.04 10*3/MM3 (ref 0–0.2)
BASOPHILS NFR BLD AUTO: 0.4 % (ref 0–1.5)
BUN SERPL-MCNC: 24 MG/DL (ref 8–23)
BUN/CREAT SERPL: 30.8 (ref 7–25)
CALCIUM SPEC-SCNC: 8.5 MG/DL (ref 8.6–10.5)
CHLORIDE SERPL-SCNC: 104 MMOL/L (ref 98–107)
CO2 SERPL-SCNC: 25 MMOL/L (ref 22–29)
CREAT SERPL-MCNC: 0.78 MG/DL (ref 0.57–1)
DEPRECATED RDW RBC AUTO: 50.1 FL (ref 37–54)
EOSINOPHIL # BLD AUTO: 0.65 10*3/MM3 (ref 0–0.4)
EOSINOPHIL NFR BLD AUTO: 6 % (ref 0.3–6.2)
ERYTHROCYTE [DISTWIDTH] IN BLOOD BY AUTOMATED COUNT: 14.9 % (ref 12.3–15.4)
GFR SERPL CREATININE-BSD FRML MDRD: 70 ML/MIN/1.73
GLUCOSE BLDC GLUCOMTR-MCNC: 125 MG/DL (ref 70–130)
GLUCOSE BLDC GLUCOMTR-MCNC: 174 MG/DL (ref 70–130)
GLUCOSE BLDC GLUCOMTR-MCNC: 215 MG/DL (ref 70–130)
GLUCOSE BLDC GLUCOMTR-MCNC: 252 MG/DL (ref 70–130)
GLUCOSE SERPL-MCNC: 124 MG/DL (ref 65–99)
HCT VFR BLD AUTO: 31.4 % (ref 34–46.6)
HGB BLD-MCNC: 9.4 G/DL (ref 12–15.9)
IMM GRANULOCYTES # BLD AUTO: 0.15 10*3/MM3 (ref 0–0.05)
IMM GRANULOCYTES NFR BLD AUTO: 1.4 % (ref 0–0.5)
LYMPHOCYTES # BLD AUTO: 0.74 10*3/MM3 (ref 0.7–3.1)
LYMPHOCYTES NFR BLD AUTO: 6.8 % (ref 19.6–45.3)
MCH RBC QN AUTO: 27.5 PG (ref 26.6–33)
MCHC RBC AUTO-ENTMCNC: 29.9 G/DL (ref 31.5–35.7)
MCV RBC AUTO: 91.8 FL (ref 79–97)
MONOCYTES # BLD AUTO: 1.67 10*3/MM3 (ref 0.1–0.9)
MONOCYTES NFR BLD AUTO: 15.3 % (ref 5–12)
NEUTROPHILS NFR BLD AUTO: 7.65 10*3/MM3 (ref 1.7–7)
NEUTROPHILS NFR BLD AUTO: 70.1 % (ref 42.7–76)
NRBC BLD AUTO-RTO: 0.2 /100 WBC (ref 0–0.2)
PLATELET # BLD AUTO: 198 10*3/MM3 (ref 140–450)
PMV BLD AUTO: 10.9 FL (ref 6–12)
POTASSIUM SERPL-SCNC: 3.9 MMOL/L (ref 3.5–5.2)
RBC # BLD AUTO: 3.42 10*6/MM3 (ref 3.77–5.28)
SARS-COV-2 RDRP RESP QL NAA+PROBE: NOT DETECTED
SODIUM SERPL-SCNC: 137 MMOL/L (ref 136–145)
WBC # BLD AUTO: 10.9 10*3/MM3 (ref 3.4–10.8)

## 2020-08-17 PROCEDURE — 97530 THERAPEUTIC ACTIVITIES: CPT

## 2020-08-17 PROCEDURE — 82962 GLUCOSE BLOOD TEST: CPT

## 2020-08-17 PROCEDURE — 97166 OT EVAL MOD COMPLEX 45 MIN: CPT | Performed by: OCCUPATIONAL THERAPIST

## 2020-08-17 PROCEDURE — 85025 COMPLETE CBC W/AUTO DIFF WBC: CPT | Performed by: INTERNAL MEDICINE

## 2020-08-17 PROCEDURE — 63710000001 INSULIN LISPRO (HUMAN) PER 5 UNITS: Performed by: ORTHOPAEDIC SURGERY

## 2020-08-17 PROCEDURE — 87635 SARS-COV-2 COVID-19 AMP PRB: CPT | Performed by: INTERNAL MEDICINE

## 2020-08-17 PROCEDURE — 63710000001 INSULIN DETEMIR PER 5 UNITS: Performed by: INTERNAL MEDICINE

## 2020-08-17 PROCEDURE — 80048 BASIC METABOLIC PNL TOTAL CA: CPT | Performed by: INTERNAL MEDICINE

## 2020-08-17 PROCEDURE — 25010000002 CEFTRIAXONE PER 250 MG: Performed by: INTERNAL MEDICINE

## 2020-08-17 PROCEDURE — 63710000001 INSULIN LISPRO (HUMAN) PER 5 UNITS: Performed by: INTERNAL MEDICINE

## 2020-08-17 PROCEDURE — 99233 SBSQ HOSP IP/OBS HIGH 50: CPT | Performed by: INTERNAL MEDICINE

## 2020-08-17 RX ADMIN — INSULIN LISPRO 2 UNITS: 100 INJECTION, SOLUTION INTRAVENOUS; SUBCUTANEOUS at 17:58

## 2020-08-17 RX ADMIN — ACETAMINOPHEN 650 MG: 325 TABLET, FILM COATED ORAL at 19:44

## 2020-08-17 RX ADMIN — HYDROCODONE BITARTRATE AND ACETAMINOPHEN 1 TABLET: 5; 325 TABLET ORAL at 13:03

## 2020-08-17 RX ADMIN — HYDROCODONE BITARTRATE AND ACETAMINOPHEN 1 TABLET: 5; 325 TABLET ORAL at 02:50

## 2020-08-17 RX ADMIN — INSULIN LISPRO 3 UNITS: 100 INJECTION, SOLUTION INTRAVENOUS; SUBCUTANEOUS at 22:14

## 2020-08-17 RX ADMIN — RIVAROXABAN 15 MG: 15 TABLET, FILM COATED ORAL at 17:57

## 2020-08-17 RX ADMIN — INSULIN LISPRO 5 UNITS: 100 INJECTION, SOLUTION INTRAVENOUS; SUBCUTANEOUS at 17:57

## 2020-08-17 RX ADMIN — CEFTRIAXONE SODIUM 1 G: 1 INJECTION, POWDER, FOR SOLUTION INTRAMUSCULAR; INTRAVENOUS at 22:14

## 2020-08-17 RX ADMIN — INSULIN LISPRO 5 UNITS: 100 INJECTION, SOLUTION INTRAVENOUS; SUBCUTANEOUS at 08:23

## 2020-08-17 RX ADMIN — TAMSULOSIN HYDROCHLORIDE 0.4 MG: 0.4 CAPSULE ORAL at 08:23

## 2020-08-17 RX ADMIN — INSULIN DETEMIR 10 UNITS: 100 INJECTION, SOLUTION SUBCUTANEOUS at 22:15

## 2020-08-17 RX ADMIN — SODIUM CHLORIDE, PRESERVATIVE FREE 10 ML: 5 INJECTION INTRAVENOUS at 22:16

## 2020-08-17 RX ADMIN — RIVAROXABAN 15 MG: 15 TABLET, FILM COATED ORAL at 08:23

## 2020-08-17 RX ADMIN — INSULIN LISPRO 4 UNITS: 100 INJECTION, SOLUTION INTRAVENOUS; SUBCUTANEOUS at 12:46

## 2020-08-17 RX ADMIN — ATENOLOL 50 MG: 50 TABLET ORAL at 08:23

## 2020-08-17 RX ADMIN — INSULIN LISPRO 5 UNITS: 100 INJECTION, SOLUTION INTRAVENOUS; SUBCUTANEOUS at 12:46

## 2020-08-17 NOTE — PLAN OF CARE
Patient oriented to self only. VSS. PRN norco administered for pain. Q2 turn. Pt voiding with purwick in place. Pt sleeping between care rounds.

## 2020-08-17 NOTE — PROGRESS NOTES
[facility] Medicine Services  PROGRESS NOTE    Patient Name: Analilia De Leon  : 1935  MRN: [id number]    Date of Admission: 2020  Primary Care Physician: Sravan Franco APRN    Subjective   Subjective     CC:  Right hip fracture    HPI:  Pt seen and examined. Nursing notes reviewed. Pt sitting up in chair at bedside this am.  She was able to tell me the city we are in but is otherwise still confused.     Review of Systems  Gen- No fevers, chills  CV- No chest pain, palpitations  Resp- No cough, dyspnea  GI- No N/V/D, abd pain    Objective   Objective     Vital Signs:   Temp:  [97.6 °F (36.4 °C)-98.1 °F (36.7 °C)] 98.1 °F (36.7 °C)  Heart Rate:  [71-96] 71  Resp:  [16-18] 16  BP: (134-170)/(65-92) 159/74        Physical Exam:  Constitutional: No acute distress, awake, interactive, sitting up in chair at bedside  HENT: NCAT, mucous membranes moist  Respiratory: Clear to auscultation bilaterally, respiratory effort normal   Cardiovascular:RRR,  no murmurs, rubs, or gallops, palpable pedal pulses bilaterally  Gastrointestinal: Positive bowel sounds, soft, nontender, nondistended  Musculoskeletal: No bilateral ankle edema, RLE wound site c/d/i  Psychiatric: Appropriate affect, cooperative  Neurologic: alert, states that we are in [place], unable to answer other orientation questions  Skin: No rashes    Results Reviewed:  Results from last 7 days   Lab Units 20  0649 08/15/20  0615 20  0154 20  1241  20   WBC 10*3/mm3 12.25* 13.71* 16.29* 17.02*   < >  --    HEMOGLOBIN g/dL 9.6* 6.8* 7.3* 8.1*   < >  --    HEMATOCRIT % 31.5* 23.4* 25.4* 27.1*   < >  --    PLATELETS 10*3/mm3 187 180 163 159   < >  --    INR   --   --   --  1.48*  --  1.28*   PROCALCITONIN ng/mL  --   --   --   --   --  0.07    < > = values in this interval not displayed.     Results from last 7 days   Lab Units 20  0649 08/15/20  0615 20  0507  20   SODIUM  mmol/L 136 141 137   < > 138   POTASSIUM mmol/L 4.1 4.2 5.5*   < > 5.6*   CHLORIDE mmol/L 100 108* 109*   < > 106   CO2 mmol/L 23.0 22.0 19.0*   < > 21.0*   BUN mg/dL 33* 36* 21   < > 13   CREATININE mg/dL 0.88 1.03* 1.03*   < > 0.99   GLUCOSE mg/dL 205* 146* 197*   < > 178*   CALCIUM mg/dL 8.6 8.6 8.6   < > 9.2   ALT (SGPT) U/L  --   --   --   --  15   AST (SGOT) U/L  --   --   --   --  23   TROPONIN T ng/mL  --   --  <0.010  --  <0.010   PROBNP pg/mL  --   --  9,136.0*  --   --     < > = values in this interval not displayed.     Estimated Creatinine Clearance: 38.6 mL/min (by C-G formula based on SCr of 0.88 mg/dL).    Microbiology Results Abnormal     Procedure Component Value - Date/Time    Blood Culture - Blood, Hand, Left [[id number]] Collected:  08/12/20 0013    Lab Status:  Final result Specimen:  Blood from Hand, Left Updated:  08/17/20 0015     Blood Culture No growth at 5 days    Blood Culture - Blood, Hand, Left [[id number]] Collected:  08/11/20 2054    Lab Status:  Preliminary result Specimen:  Blood from Hand, Left Updated:  08/16/20 0815     Blood Culture No growth at 4 days    MRSA Screen, PCR (Inpatient) - Swab, Nares [[id number]]  (Normal) Collected:  08/14/20 1117    Lab Status:  Final result Specimen:  Swab from Nares Updated:  08/14/20 1252     MRSA PCR Negative    Narrative:       MRSA Negative    Urine Culture - Urine, Urine, Catheter In/Out [[id number]]  (Normal) Collected:  08/13/20 0627    Lab Status:  Final result Specimen:  Urine, Catheter In/Out Updated:  08/14/20 0725     Urine Culture No growth    Urine Culture - Urine, Urine, Catheter In/Out [[id number]]  (Normal) Collected:  08/12/20 0138    Lab Status:  Final result Specimen:  Urine, Catheter In/Out Updated:  08/13/20 1309     Urine Culture No growth    COVID PRE-OP / PRE-PROCEDURE SCREENING ORDER (NO ISOLATION) - Swab, Nasopharynx [[id number]] Collected:  08/12/20 0038    Lab Status:  Final result Specimen:  Swab from Nasopharynx  Updated:  08/12/20 0142    Narrative:       The following orders were created for panel order COVID PRE-OP / PRE-PROCEDURE SCREENING ORDER (NO ISOLATION) - Swab, Nasopharynx.  Procedure                               Abnormality         Status                     ---------                               -----------         ------                     COVID-19, ABBOTT IN-HOUS...[[id number]]  Normal              Final result                 Please view results for these tests on the individual orders.    COVID-19, ABBOTT IN-HOUSE,NP Swab (NO TRANSPORT MEDIA) 2 HR TAT - Swab, Nasopharynx [[id number]]  (Normal) Collected:  08/12/20 0038    Lab Status:  Final result Specimen:  Swab from Nasopharynx Updated:  08/12/20 0142     COVID19 Not Detected    Narrative:       Fact sheet for providers: https://www.fda.gov/media/240115/download     Fact sheet for patients: https://www.fda.gov/media/880679/download          Imaging Results (Last 24 Hours)     ** No results found for the last 24 hours. **          Results for orders placed during the hospital encounter of 08/11/20   Adult Transthoracic Echo Limited W/ Cont if Necessary Per Protocol    Narrative · Left ventricular systolic function is normal. Estimated EF = 50%.  · Severe biatrial enlargement.  · Severe tricuspid valve regurgitation is present.  · Severe pulmonary hypertension is present. Estimated right ventricular   systolic pressure from tricuspid regurgitation is markedly elevated (83   mmHg).          I have reviewed the medications:  Scheduled Meds:    atenolol 50 mg Oral Q24H   cefTRIAXone 1 g Intravenous Q24H   insulin detemir 10 Units Subcutaneous Nightly   insulin lispro 0-7 Units Subcutaneous 4x Daily With Meals & Nightly   insulin lispro 5 Units Subcutaneous TID With Meals   rivaroxaban 15 mg Oral BID With Meals   Followed by      [START ON 9/6/2020] rivaroxaban 20 mg Oral Daily With Dinner   sodium chloride 10 mL Intravenous Q12H   sodium zirconium  cyclosilicate 10 g Oral Once   tamsulosin 0.4 mg Oral Daily     Continuous Infusions:    dilTIAZem 5-15 mg/hr Last Rate: Stopped (08/13/20 1700)   lactated ringers 9 mL/hr Last Rate: 9 mL/hr (08/12/20 1510)     PRN Meds:.•  acetaminophen  •  acetaminophen  •  bisacodyl  •  dextrose  •  dextrose  •  glucagon (human recombinant)  •  HYDROcodone-acetaminophen  •  lactated ringers  •  magnesium hydroxide  •  Morphine  •  naloxone  •  promethazine  •  sodium chloride    Assessment/Plan   Assessment & Plan     Active Hospital Problems    Diagnosis  POA   • **Hip fracture (CMS/HCC) [S72.009A]  Yes     Priority: High   • Acute pulmonary embolism (CMS/HCC) [I26.99]  Unknown     Priority: High   • Pulmonary hypertension (CMS/HCC) [I27.20]  Unknown     Priority: High   • Tricuspid regurgitation [I07.1]  Unknown     Priority: High   • Acute respiratory failure with hypoxia (CMS/HCC) [J96.01]  Unknown     Priority: High   • Status post hip hemiarthroplasty [Z96.649]  Not Applicable     Priority: High   • Altered mental status [R41.82]  Yes     Priority: High   • Acute UTI (urinary tract infection) [N39.0]  Yes     Priority: Medium   • Fall [W19.XXXA]  Unknown     Priority: Medium   • Atrial fibrillation (CMS/HCC) [I48.91]  Yes     Priority: Medium   • Hyperlipidemia [E78.5]  Yes     Priority: Low   • Hypertension [I10]  Yes     Priority: Low   • CAD (coronary artery disease) [I25.10]  Yes     Priority: Low   • Iron deficiency anemia [D50.9]  Yes      Resolved Hospital Problems   No resolved problems to display.        Brief Hospital Course to date:  Analilia De Leon is a 85 y.o. female with h/o CAD, HTN, T2DM, HLD as well as pAF on xarelto who presented as a transfer from OSH due to acute right sided hip fracture. Post-op, pt had acute hypoxic respiratory failure initially requiring nonrebreather and pt had AMS that improved with IV narcan.     Plan:    Right hip fracture due to mechanical fall  --s/p OR with Dr. John HARTMAN  "5  -symptom mgt; PRN Morphine pain control  -case mgt consult   -CT head unremarkable  -COVID-19 pre-procedure swab NEGATIVE    Acute hypoxic respiratory failure  Acute PE  Flash pulmonary edema  Severe Pulmonary HTN  --proBNP 9,000 suspect flash pulm edema due to Afib with RVR  --monitor volume status. Defer Lasix for today as she appears euvolemic on my exam  --TTE done to assess LVEF and to evaluate for right heart strain.  Normal EF with severe pulmonary HTN, tricuspid regurg with RVSP of 83.  Consulted Cardiology for mgmt.   --stopped heparin gtt since H&H stable. Transitioned back to Xarelto but will do 15 mg BID for 21 days then 20 mg daily  instead of her prior dose of 10mg given active thrombosis/PE.     Sepsis (tachycardic, leukocytosis)- resolved  --likely due to aspiration event vs UTI which was POA  --blood cultures from admission NGTD  --UCx as below  --continue IV Rocephin for now,stopped Vanc as MRSA PCR was negative  --leukocytosis improving     Altered mental status  -per family pt became \"mildly\" confused after IV Narcotics given at OSH  - initial CT head unremarkable, repeat CT head unremarkable  --ABG with only hypoxia  --improving, suspect this was all due to sedation during OR. Remains confused but is improving daily (very slowly)     Ecoli UTI  -per OSH UA (+nitrites, 2+ bacteria, WBC 30)  -started on IV Rocephin, Reviewed cultures from there, appears to be E coli which was pan-susceptible. D6/7 of ABX  -repeat blood and UA/urine cultures NGTD.  UCx here may be of little value due to antibiotic modification  -lactic and procal wnl     T2DM  -hem a1c 6.8%  -FSBG q ac/hs w/ LDSS  --added basal/bolus schedule as pt is eating now and FSBS have been high. Will likely need to adjust further based on today's requirements.   -hold routine metformin      PAF  -CHADSVASC 5  -on routine Xarelto; adjustments as mentioned above  --continue rate controlling meds- on Atenolol at home per med rec  --d/c dilt " gtt, can adjust beta blocker dose if she goes back into Afib with RVR     HTN/HLD  --resumed home meds    Acute Blood Loss Anemia  --s/p 2 units PRBCs on 8/15 for HgB of 6.8. Transitioned back to Xarelto as mentioned above        DVT prophylaxis:  anticoagulated         Disposition: I expect the patient to be discharged tbd, PT/OT following. Will order COVID screen for placement (last COVID screen was >72 hours ago).  Likely will be ready for d/c/transfer 1-2 days.     CODE STATUS:   Code Status and Medical Interventions:   Ordered at: 08/12/20 2020     Code Status:    CPR     Medical Interventions (Level of Support Prior to Arrest):    Full         Electronically signed by Becka Peres MD, 08/17/20, 08:04.

## 2020-08-17 NOTE — PROGRESS NOTES
Continued Stay Note  [facility]     Patient Name: Analilia De Leon  MRN: [id number]  Today's Date: 8/17/2020    Admit Date: 8/11/2020    Discharge Plan     Row Name 08/17/20 1122       Plan    Plan  [facility]    Provided Post Acute Provider List?  Yes    Post Acute Provider List  Nursing Home    Patient/Family in Agreement with Plan  yes    Plan Comments  Per Terence w/ [facility], they do not have any available beds at this time. Referral made to Caridad urbina/ the [facility] for [place] location. Per Caridad they are able to accept patient. Per MD trevino to start precert tomorrow. CM will arrange [facility] transport tentatively for Wednesday. Current Covid test pending. Updated patient's daughter, Ms. Duenas, via phone, and she is agreeable w/ plan. CM following.     Final Discharge Disposition Code  03 - skilled nursing facility (SNF)        Discharge Codes    No documentation.       Expected Discharge Date and Time     Expected Discharge Date Expected Discharge Time    Aug 17, 2020             Jennifer Shah RN

## 2020-08-17 NOTE — THERAPY TREATMENT NOTE
"Patient Name: Analilia De Leon  : 1935    MRN: [id number]                              Today's Date: 2020       Admit Date: 2020    Visit Dx:     ICD-10-CM ICD-9-CM   1. Hyperkalemia E87.5 276.7     Patient Active Problem List   Diagnosis   • Iron deficiency anemia   • Hypertension   • Atrial fibrillation (CMS/HCC)   • Hyperlipidemia   • CAD (coronary artery disease)   • Hip fracture (CMS/HCC)   • Acute UTI (urinary tract infection)   • Altered mental status   • Fall   • Status post hip hemiarthroplasty   • Acute respiratory failure with hypoxia (CMS/HCC)   • Acute pulmonary embolism (CMS/HCC)   • Pulmonary hypertension (CMS/HCC)   • Tricuspid regurgitation     Past Medical History:   Diagnosis Date   • Ataxia 2017   • Atrial fibrillation (CMS/HCC)    • BPPV (benign paroxysmal positional vertigo) 2017   • Chronic anticoagulation    • Facial numbness     takes Keppra for \"facial numbness\"    • History of blood transfusion    • Hypertension    • Microangiopathy (CMS/HCC) 2017   • T2DM (type 2 diabetes mellitus) (CMS/HCC)    • TIA (transient ischemic attack) 3/17/2017   • Weakness 2017     Past Surgical History:   Procedure Laterality Date   • CHOLECYSTECTOMY     • ENDOSCOPY  2015    showed 2 ulcerated lesions in the gastric antrum   • HIP HEMIARTHROPLASTY Right 2020    Procedure: HIP BI-POLAR RIGHT;  Surgeon: Ramirez Lopez MD;  Location: [facility];  Service: Orthopedics;  Laterality: Right;   • HYSTERECTOMY     • KNEE SURGERY Right    • OOPHORECTOMY       General Information     Row Name 20 0842          PT Evaluation Time/Intention    Document Type  therapy note (daily note)  -LR     Mode of Treatment  physical therapy  -LR     Row Name 20 0842          General Information    Patient Profile Reviewed?  yes  -LR     Existing Precautions/Restrictions  fall;right;hip, posterior;other (see comments) confusion  -LR     Barriers to Rehab  medically complex;cognitive " status  -LR     Row Name 2042          Cognitive Assessment/Intervention- PT/OT    Orientation Status (Cognition)  oriented to;person;place;disoriented to;situation;time;verbal cues/prompts needed for orientation  -LR     Cognitive Assessment/Intervention Comment  could not state  or place, chose hospital with choices of hospital vs home,   -LR     Row Name 2042          Safety Issues, Functional Mobility    Safety Issues Affecting Function (Mobility)  ability to follow commands;awareness of need for assistance;insight into deficits/self awareness;judgment;safety precautions follow-through/compliance;sequencing abilities;problem solving;safety precaution awareness  -LR     Impairments Affecting Function (Mobility)  balance;strength;cognition;coordination;pain;endurance/activity tolerance;postural/trunk control;range of motion (ROM);motor control  -LR       User Key  (r) = Recorded By, (t) = Taken By, (c) = Cosigned By    Initials Name Provider Type    LR Natasha Monroe, PT Physical Therapist        Mobility     Row Name 20          Bed Mobility Assessment/Treatment    Bed Mobility Assessment/Treatment  supine-sit  -LR     Supine-Sit [place] (Bed Mobility)  verbal cues;maximum assist (25% patient effort);2 person assist  -LR     Assistive Device (Bed Mobility)  head of bed elevated;bed rails;draw sheet  -LR     Comment (Bed Mobility)  Verbal cues to move LEs towards and to scoot hips towards EOB. Patient assisted with movement of L LE only and provided some assist with sitting trunk forward. Used draw sheet to scoot hips towards EOB. Verbal cues to push up from bed to raise trunk into sitting and to scoot hips out to get feet on floor. Patient initially with posterior lean sitting EOB but improved with cues for correction. Denied dizziness upon sitting up.   -LR     Row Name 20          Transfer Assessment/Treatment    Comment (Transfers)  Verbal cues to push up  from bed to stand and to reach back for chair to lower into sitting. Verbal cues to step R LE out before t/f for comfort. Verbal cues for steps from bed to chair . Cues for upright posture with t/f to chair.   -LR     Row Name 08/17/20 0842          Bed-Chair Transfer    Bed-Chair [place] (Transfers)  verbal cues;maximum assist (25% patient effort);2 person assist  -LR     Assistive Device (Bed-Chair Transfers)  other (see comments) B UE support, support at gait belt  -     Row Name 08/17/20 0842          Sit-Stand Transfer    Sit-Stand [place] (Transfers)  verbal cues;maximum assist (25% patient effort);2 person assist  -LR     Assistive Device (Sit-Stand Transfers)  other (see comments) B UE support, support at gait belt  -     Row Name 08/17/20 0842          Gait/Stairs Assessment/Training    [place] Level (Gait)  unable to assess;not tested  -LR     Comment (Gait/Stairs)  Unable to perform forward ambulation at this time but was able to take shuffling steps from bed to chair during t/f.   -     Row Name 08/17/20 0842          Mobility Assessment/Intervention    Extremity Weight-bearing Status  right lower extremity  -LR     Right Lower Extremity (Weight-bearing Status)  weight-bearing as tolerated (WBAT)  -LR       User Key  (r) = Recorded By, (t) = Taken By, (c) = Cosigned By    Initials Name Provider Type    LR Natasha Monroe, PT Physical Therapist        Obj/Interventions     Row Name 08/17/20 0842          Therapeutic Exercise    Lower Extremity (Therapeutic Exercise)  heel slides, right;LAQ (long arc quad), right;quad sets, right;SAQ (short arc quad), right;SLR (straight leg raise), right  -LR     Lower Extremity Range of Motion (Therapeutic Exercise)  hip abduction/adduction, right;ankle dorsiflexion/plantar flexion, bilateral  -LR     Exercise Type (Therapeutic Exercise)  isotonic contraction, concentric;isometric contraction, static;AAROM (active assistive range of motion)   -LR     Position (Therapeutic Exercise)  seated  -LR     Sets/Reps (Therapeutic Exercise)  x15 reps each  -LR     Comment (Therapeutic Exercise)  cues for technique; required repeated cues to participate, little participation noted from patient.   -LR     Row Name 08/17/20 0842          Static Sitting Balance    Level of [place] (Unsupported Sitting, Static Balance)  moderate assist, 50 to 74% patient effort;contact guard assist  -LR     Sitting Position (Unsupported Sitting, Static Balance)  sitting on edge of bed  -LR     Time Able to Maintain Position (Unsupported Sitting, Static Balance)  3 to 4 minutes  -LR     Comment (Unsupported Sitting, Static Balance)  initially with posterior lean requiring mod assist, progressed to CGA; CGA sitting in chair  -LR     Row Name 08/17/20 0842          Static Standing Balance    Level of [place] (Supported Standing, Static Balance)  maximal assist, 25 to 49% patient effort;2 person assist  -LR     Time Able to Maintain Position (Supported Standing, Static Balance)  less than 15 seconds  -LR     Assistive Device Utilized (Supported Standing, Static Balance)  other (see comments) B UE support, support at gait belt  -LR     Row Name 08/17/20 0842          Dynamic Standing Balance    Level of [place], Reaches Outside Midline (Standing, Dynamic Balance)  maximal assist, 25 to 49% patient effort;2 person assist  -LR     Time Able to Maintain Position, Reaches Outside Midline (Standing, Dynamic Balance)  1 to 2 minutes  -LR     Assistive Device Utilized (Supported Standing, Dynamic Balance)  other (see comments) B UE support, support at gait belt  -LR       User Key  (r) = Recorded By, (t) = Taken By, (c) = Cosigned By    Initials Name Provider Type    LR Natasha Monroe, PT Physical Therapist        Goals/Plan     Row Name 08/17/20 0842          Bed Mobility Goal 1 (PT)    Activity/Assistive Device (Bed Mobility Goal 1, PT)  sit to supine/supine to sit  -LR      [place] Level/Cues Needed (Bed Mobility Goal 1, PT)  moderate assist (50-74% patient effort);2 person assist  -LR     Time Frame (Bed Mobility Goal 1, PT)  long term goal (LTG);5 days  -LR     Progress/Outcomes (Bed Mobility Goal 1, PT)  progress slower than expected;goal ongoing  -LR     Row Name 08/17/20 0842          Transfer Goal 1 (PT)    Activity/Assistive Device (Transfer Goal 1, PT)  sit-to-stand/stand-to-sit;walker, rolling;bed-to-chair/chair-to-bed  -LR     [place] Level/Cues Needed (Transfer Goal 1, PT)  moderate assist (50-74% patient effort);2 person assist  -LR     Time Frame (Transfer Goal 1, PT)  long term goal (LTG);5 days  -LR     Progress/Outcome (Transfer Goal 1, PT)  progress slower than expected;goal ongoing  -LR     Row Name 08/17/20 0842          Gait Training Goal 1 (PT)    Activity/Assistive Device (Gait Training Goal 1, PT)  gait (walking locomotion);walker, rolling  -LR     [place] Level (Gait Training Goal 1, PT)  moderate assist (50-74% patient effort);2 person assist  -LR     Distance (Gait Goal 1, PT)  10 feet  -LR     Time Frame (Gait Training Goal 1, PT)  long term goal (LTG);5 days  -LR     Progress/Outcome (Gait Training Goal 1, PT)  progress slower than expected;goal ongoing  -LR       User Key  (r) = Recorded By, (t) = Taken By, (c) = Cosigned By    Initials Name Provider Type    LR Natasha Monroe, PT Physical Therapist        Clinical Impression     Row Name 08/17/20 0842          Pain Assessment    Additional Documentation  Pain Scale: Numbers Pre/Post-Treatment (Group)  -LR     Row Name 08/17/20 0842          Pain Scale: Numbers Pre/Post-Treatment    Pain Scale: Numbers, Pretreatment  0/10 - no pain  -LR     Pain Scale: Numbers, Post-Treatment  0/10 - no pain  -LR     Pain Location - Side  Right  -LR     Pain Location  hip  -LR     Pain Intervention(s)  Ambulation/increased activity;Repositioned  -LR     Row Name 08/17/20 0842          Plan of Care  Review    Plan of Care Reviewed With  patient  -LR     Progress  no change  -LR     Row Name 08/17/20 0842          Physical Therapy Clinical Impression    Criteria for Skilled Interventions Met (PT Clinical Impression)  yes;treatment indicated  -LR     Rehab Potential (PT Clinical Summary)  fair, will monitor progress closely  -LR     Row Name 08/17/20 0842          Positioning and Restraints    Pre-Treatment Position  in bed  -LR     Post Treatment Position  chair  -LR     In Chair  notified nsg;reclined;sitting;call light within reach;encouraged to call for assist;exit alarm on;legs elevated;waffle cushion;on mechanical lift sling;ABD pillow;compression device  -LR       User Key  (r) = Recorded By, (t) = Taken By, (c) = Cosigned By    Initials Name Provider Type    Natasha Whitfield, ALYSSA Physical Therapist        Outcome Measures     Row Name 08/17/20 0842          How much help from another person do you currently need...    Turning from your back to your side while in flat bed without using bedrails?  2  -LR     Moving from lying on back to sitting on the side of a flat bed without bedrails?  2  -LR     Moving to and from a bed to a chair (including a wheelchair)?  2  -LR     Standing up from a chair using your arms (e.g., wheelchair, bedside chair)?  2  -LR     Climbing 3-5 steps with a railing?  1  -LR     To walk in hospital room?  1  -LR     AM-PAC 6 Clicks Score (PT)  10  -LR     Row Name 08/17/20 0842          Functional Assessment    Outcome Measure Options  AM-PAC 6 Clicks Basic Mobility (PT)  -LR       User Key  (r) = Recorded By, (t) = Taken By, (c) = Cosigned By    Initials Name Provider Type    Natasha Whitfield, PT Physical Therapist        Physical Therapy Education                 Title: PT OT SLP Therapies (In Progress)     Topic: Physical Therapy (In Progress)     Point: Mobility training (In Progress)     Description:   Instruct learner(s) on safety and technique for assisting  patient out of bed, chair or wheelchair.  Instruct in the proper use of assistive devices, such as walker, crutches, cane or brace.              Patient Friendly Description:   It's important to get you on your feet again, but we need to do so in a way that is safe for you. Falling has serious consequences, and your personal safety is the most important thing of all.        When it's time to get out of bed, one of us or a family member will sit next to you on the bed to give you support.     If your doctor or nurse tells you to use a walker, crutches, a cane, or a brace, be sure you use it every time you get out of bed, even if you think you don't need it.    Learning Progress Summary           Patient Acceptance, E,D, NR by LR at 8/17/2020 0842    Comment:  Educated on weight bearing status, posterior hip precautions, correct supine to sit t/f technique, correct sit<->stand t/f technique, correct bed to chair t/f technique, and progression of POC.    Acceptance, E,D, NR by LR at 8/16/2020 1308    Comment:  Educated on posterior hip precautions, weight bearing status, correct sit<->stand t/f technique, correct posture, HEP, and progression of POC.    Acceptance, E, NR by AS at 8/15/2020 1036    Acceptance, E, NR by SC at 8/14/2020 1458    Comment:  reviewed HEP    Acceptance, E,D, VU,NR by LR at 8/13/2020 1528    Comment:  Educated on posterior hip precautions, weight bearing status, correct supine to sit t/f technique, correct sit<->stand t/f technique, safety with mobility, and progression of POC.   Family Acceptance, E,D, NR by LR at 8/16/2020 1308    Comment:  Educated on posterior hip precautions, weight bearing status, correct sit<->stand t/f technique, correct posture, HEP, and progression of POC.    Acceptance, E, NR by SC at 8/14/2020 1458    Comment:  reviewed HEP    Acceptance, E,D, VU,NR by LR at 8/13/2020 1528    Comment:  Educated on posterior hip precautions, weight bearing status, correct supine to  sit t/f technique, correct sit<->stand t/f technique, safety with mobility, and progression of POC.                   Point: Home exercise program (In Progress)     Description:   Instruct learner(s) on appropriate technique for monitoring, assisting and/or progressing patient with therapeutic exercises and activities.              Learning Progress Summary           Patient Acceptance, E,D, NR by LR at 8/17/2020 0842    Comment:  Educated on weight bearing status, posterior hip precautions, correct supine to sit t/f technique, correct sit<->stand t/f technique, correct bed to chair t/f technique, and progression of POC.    Acceptance, E,D, NR by LR at 8/16/2020 1308    Comment:  Educated on posterior hip precautions, weight bearing status, correct sit<->stand t/f technique, correct posture, HEP, and progression of POC.    Acceptance, E, NR by AS at 8/15/2020 1036    Acceptance, E, NR by SC at 8/14/2020 1458    Comment:  reviewed HEP    Acceptance, E,D, VU,NR by LR at 8/13/2020 1528    Comment:  Educated on posterior hip precautions, weight bearing status, correct supine to sit t/f technique, correct sit<->stand t/f technique, safety with mobility, and progression of POC.   Family Acceptance, E,D, NR by LR at 8/16/2020 1308    Comment:  Educated on posterior hip precautions, weight bearing status, correct sit<->stand t/f technique, correct posture, HEP, and progression of POC.    Acceptance, E, NR by SC at 8/14/2020 1458    Comment:  reviewed HEP    Acceptance, E,D, VU,NR by LR at 8/13/2020 1528    Comment:  Educated on posterior hip precautions, weight bearing status, correct supine to sit t/f technique, correct sit<->stand t/f technique, safety with mobility, and progression of POC.                   Point: Body mechanics (In Progress)     Description:   Instruct learner(s) on proper positioning and spine alignment for patient and/or caregiver during mobility tasks and/or exercises.              Learning Progress  Summary           Patient Acceptance, E,D, NR by LR at 8/17/2020 0842    Comment:  Educated on weight bearing status, posterior hip precautions, correct supine to sit t/f technique, correct sit<->stand t/f technique, correct bed to chair t/f technique, and progression of POC.    Acceptance, E,D, NR by LR at 8/16/2020 1308    Comment:  Educated on posterior hip precautions, weight bearing status, correct sit<->stand t/f technique, correct posture, HEP, and progression of POC.    Acceptance, E, NR by AS at 8/15/2020 1036    Acceptance, E, NR by SC at 8/14/2020 1458    Comment:  reviewed HEP    Acceptance, E,D, VU,NR by LR at 8/13/2020 1528    Comment:  Educated on posterior hip precautions, weight bearing status, correct supine to sit t/f technique, correct sit<->stand t/f technique, safety with mobility, and progression of POC.   Family Acceptance, E,D, NR by LR at 8/16/2020 1308    Comment:  Educated on posterior hip precautions, weight bearing status, correct sit<->stand t/f technique, correct posture, HEP, and progression of POC.    Acceptance, E, NR by SC at 8/14/2020 1458    Comment:  reviewed HEP    Acceptance, E,D, VU,NR by LR at 8/13/2020 1528    Comment:  Educated on posterior hip precautions, weight bearing status, correct supine to sit t/f technique, correct sit<->stand t/f technique, safety with mobility, and progression of POC.                   Point: Precautions (In Progress)     Description:   Instruct learner(s) on prescribed precautions during mobility and gait tasks              Learning Progress Summary           Patient Acceptance, E,D, NR by LR at 8/17/2020 0842    Comment:  Educated on weight bearing status, posterior hip precautions, correct supine to sit t/f technique, correct sit<->stand t/f technique, correct bed to chair t/f technique, and progression of POC.    Acceptance, E,D, NR by LR at 8/16/2020 1308    Comment:  Educated on posterior hip precautions, weight bearing status, correct  sit<->stand t/f technique, correct posture, HEP, and progression of POC.    Acceptance, E, NR by AS at 8/15/2020 1036    Acceptance, E, NR by SC at 8/14/2020 1458    Comment:  reviewed HEP    Acceptance, E,D, VU,NR by LR at 8/13/2020 1528    Comment:  Educated on posterior hip precautions, weight bearing status, correct supine to sit t/f technique, correct sit<->stand t/f technique, safety with mobility, and progression of POC.   Family Acceptance, E,D, NR by LR at 8/16/2020 1308    Comment:  Educated on posterior hip precautions, weight bearing status, correct sit<->stand t/f technique, correct posture, HEP, and progression of POC.    Acceptance, E, NR by SC at 8/14/2020 1458    Comment:  reviewed HEP    Acceptance, E,D, VU,NR by LR at 8/13/2020 1528    Comment:  Educated on posterior hip precautions, weight bearing status, correct supine to sit t/f technique, correct sit<->stand t/f technique, safety with mobility, and progression of POC.                               User Key     Initials Effective Dates Name Provider Type Discipline    SC 06/19/15 -  Maykel Matthew, PT Physical Therapist PT    LR 06/19/15 -  Natasha Monroe, PT Physical Therapist PT    AS 06/22/15 -  Celina Jimenez, PTA Physical Therapy Assistant PT              PT Recommendation and Plan  Planned Therapy Interventions (PT Eval): balance training, bed mobility training, gait training, home exercise program, ROM (range of motion), patient/family education, strengthening, transfer training  Outcome Summary/Treatment Plan (PT)  Anticipated Equipment Needs at Discharge (PT): other (see comments)(TBD)  Anticipated Discharge Disposition (PT): skilled nursing facility  Plan of Care Reviewed With: patient  Progress: no change  Outcome Summary: Patient t/f from bed to chair with max assist x2, limited by weakness and impaired ability to follow commands. Improved upright posture with OOB mobility today, able to take shuffling steps from bed  to chair. Still limited by confusion. Will continue to progress mobility training and strengthening as able.     Time Calculation:   PT Charges     Row Name 08/17/20 0842             Time Calculation    Start Time  0842  -LR      PT Received On  08/17/20  -LR      PT Goal Re-Cert Due Date  08/23/20  -LR         Time Calculation- PT    Total Timed Code Minutes- PT  17 minute(s)  -LR         Timed Charges    20876 - PT Therapeutic Exercise Minutes  6  -LR      65792 - PT Therapeutic Activity Minutes  11  -LR        User Key  (r) = Recorded By, (t) = Taken By, (c) = Cosigned By    Initials Name Provider Type    LR Natasha Monroe, PT Physical Therapist        Therapy Charges for Today     Code Description Service Date Service Provider Modifiers Qty    30851889004 HC PT THERAPEUTIC ACT EA 15 MIN 8/16/2020 Natasha Monroe, PT GP 1    [id number]  PT THER SUPP EA 15 MIN 8/16/2020 Natasha Monroe, PT GP 2    37954125684 HC PT THERAPEUTIC ACT EA 15 MIN 8/17/2020 Natasha Monroe, PT GP 1          PT G-Codes  Outcome Measure Options: AM-PAC 6 Clicks Basic Mobility (PT)  AM-PAC 6 Clicks Score (PT): 10    Natasha Monroe, PT  8/17/2020

## 2020-08-17 NOTE — PLAN OF CARE
Problem: Patient Care Overview  Goal: Plan of Care Review  Outcome: Ongoing (interventions implemented as appropriate)  Flowsheets (Taken 8/17/2020 0055)  Outcome Summary: Patient t/f from bed to chair with max assist x2, limited by weakness and impaired ability to follow commands. Improved upright posture with OOB mobility today, able to take shuffling steps from bed to chair. Still limited by confusion. Will continue to progress mobility training and strengthening as able.

## 2020-08-17 NOTE — PLAN OF CARE
Problem: Patient Care Overview  Goal: Plan of Care Review  Flowsheets  Taken 8/17/2020 7452  Plan of Care Reviewed With: patient  Taken 8/17/2020 1035  Outcome Summary: Pt POD#5 right hip hemiarthroplasty. Pt alert, following approx 50% 1-step commands with significant delay. She required max assist x2 bed mobility and transfer to chair with max assist x2. She required max assist UB dressing and dependence self-feeding. Recommend SNF-level rehab.

## 2020-08-17 NOTE — PROGRESS NOTES
"Analilia CARCAMO [facility]  [id number]  1935    /74 (BP Location: Right arm, Patient Position: Lying)   Pulse 75   Temp 98.1 °F (36.7 °C) (Oral)   Resp 16   Ht 154.9 cm (61\")   Wt 59 kg (130 lb)   SpO2 96%   BMI 24.56 kg/m²     Lab Results (last 24 hours)     Procedure Component Value Units Date/Time    POC Glucose Once [[id number]]  (Abnormal) Collected:  08/17/20 1232    Specimen:  Blood Updated:  08/17/20 1233     Glucose 252 mg/dL     Basic Metabolic Panel [[id number]]  (Abnormal) Collected:  08/17/20 0819    Specimen:  Blood Updated:  08/17/20 0918     Glucose 124 mg/dL      BUN 24 mg/dL      Creatinine 0.78 mg/dL      Sodium 137 mmol/L      Potassium 3.9 mmol/L      Chloride 104 mmol/L      CO2 25.0 mmol/L      Calcium 8.5 mg/dL      eGFR Non African Amer 70 mL/min/1.73      BUN/Creatinine Ratio 30.8     Anion Gap 8.0 mmol/L     Narrative:       GFR Normal >60  Chronic Kidney Disease <60  Kidney Failure <15      CBC & Differential [[id number]] Collected:  08/17/20 0819    Specimen:  Blood Updated:  08/17/20 0833    Narrative:       The following orders were created for panel order CBC & Differential.  Procedure                               Abnormality         Status                     ---------                               -----------         ------                     CBC Auto Differential[[id number]]        Abnormal            Final result                 Please view results for these tests on the individual orders.    CBC Auto Differential [[id number]]  (Abnormal) Collected:  08/17/20 0819    Specimen:  Blood Updated:  08/17/20 0833     WBC 10.90 10*3/mm3      RBC 3.42 10*6/mm3      Hemoglobin 9.4 g/dL      Hematocrit 31.4 %      MCV 91.8 fL      MCH 27.5 pg      MCHC 29.9 g/dL      RDW 14.9 %      RDW-SD 50.1 fl      MPV 10.9 fL      Platelets 198 10*3/mm3      Neutrophil % 70.1 %      Lymphocyte % 6.8 %      Monocyte % 15.3 %      Eosinophil % 6.0 %      Basophil % 0.4 %      Immature Grans % 1.4 %      " Neutrophils, Absolute 7.65 10*3/mm3      Lymphocytes, Absolute 0.74 10*3/mm3      Monocytes, Absolute 1.67 10*3/mm3      Eosinophils, Absolute 0.65 10*3/mm3      Basophils, Absolute 0.04 10*3/mm3      Immature Grans, Absolute 0.15 10*3/mm3      nRBC 0.2 /100 WBC     Blood Culture - Blood, Hand, Left [[id number]] Collected:  08/11/20 2054    Specimen:  Blood from Hand, Left Updated:  08/17/20 0815     Blood Culture No growth at 5 days    POC Glucose Once [[id number]]  (Normal) Collected:  08/17/20 0719    Specimen:  Blood Updated:  08/17/20 0723     Glucose 125 mg/dL     Blood Culture - Blood, Hand, Left [[id number]] Collected:  08/12/20 0013    Specimen:  Blood from Hand, Left Updated:  08/17/20 0015     Blood Culture No growth at 5 days    POC Glucose Once [[id number]]  (Abnormal) Collected:  08/16/20 1959    Specimen:  Blood Updated:  08/16/20 2001     Glucose 189 mg/dL     POC Glucose Once [[id number]]  (Abnormal) Collected:  08/16/20 1601    Specimen:  Blood Updated:  08/16/20 1602     Glucose 164 mg/dL           Patient Care Team:  Sravan Franco APRN as PCP - General (Family Medicine)    SUBJECTIVE  Pain well controlled.  Physical therapy limited by mental status.    PHYSICAL EXAM  More alert again today  Incision benign  Minimal thigh swelling  Neurovascularly intact to knees and toes       Hip fracture (CMS/HCC)    Iron deficiency anemia    Hypertension    Atrial fibrillation (CMS/HCC)    Hyperlipidemia    CAD (coronary artery disease)    Acute UTI (urinary tract infection)    Altered mental status    Fall    Status post hip hemiarthroplasty    Acute respiratory failure with hypoxia (CMS/HCC)    Acute pulmonary embolism (CMS/HCC)    Pulmonary hypertension (CMS/HCC)    Tricuspid regurgitation      PLAN / DISPOSITION:  Hemoglobin 9.4 today.  No further transfusion anticipated.  Discharge plans in progress for possible transfer to the [facility] in 1 to 2 days.    Ramirez Lopez MD  08/17/20  13:30

## 2020-08-17 NOTE — THERAPY EVALUATION
"Acute Care - Occupational Therapy Initial Evaluation  [facility]     Patient Name: Analilia De Leon  : 1935  MRN: [id number]  Today's Date: 2020             Admit Date: 2020       ICD-10-CM ICD-9-CM   1. Hyperkalemia E87.5 276.7   2. Impaired mobility and ADLs Z74.09 V49.89    Z78.9      Patient Active Problem List   Diagnosis   • Iron deficiency anemia   • Hypertension   • Atrial fibrillation (CMS/HCC)   • Hyperlipidemia   • CAD (coronary artery disease)   • Hip fracture (CMS/HCC)   • Acute UTI (urinary tract infection)   • Altered mental status   • Fall   • Status post hip hemiarthroplasty   • Acute respiratory failure with hypoxia (CMS/HCC)   • Acute pulmonary embolism (CMS/HCC)   • Pulmonary hypertension (CMS/HCC)   • Tricuspid regurgitation     Past Medical History:   Diagnosis Date   • Ataxia 2017   • Atrial fibrillation (CMS/HCC)    • BPPV (benign paroxysmal positional vertigo) 2017   • Chronic anticoagulation    • Facial numbness     takes Keppra for \"facial numbness\"    • History of blood transfusion    • Hypertension    • Microangiopathy (CMS/HCC) 2017   • T2DM (type 2 diabetes mellitus) (CMS/[facility])    • TIA (transient ischemic attack) 3/17/2017   • Weakness 2017     Past Surgical History:   Procedure Laterality Date   • CHOLECYSTECTOMY     • ENDOSCOPY  2015    showed 2 ulcerated lesions in the gastric antrum   • HIP HEMIARTHROPLASTY Right 2020    Procedure: HIP BI-POLAR RIGHT;  Surgeon: Ramirez Lopez MD;  Location: [facility];  Service: Orthopedics;  Laterality: Right;   • HYSTERECTOMY     • KNEE SURGERY Right    • OOPHORECTOMY            OT ASSESSMENT FLOWSHEET (last 12 hours)      Occupational Therapy Evaluation     Row Name 20 0847                   OT Evaluation Time/Intention    Subjective Information  no complaints  -AR        Document Type  evaluation POD#5 right hip hemiarthroplasty  -AR        Mode of Treatment  occupational therapy  -AR           " General Information    Patient Profile Reviewed?  yes  -AR        Prior Level of Function  independent:;all household mobility;gait;community mobility;ADL's  -AR        Existing Precautions/Restrictions  fall;hip, posterior;right confusion  -AR        Barriers to Rehab  cognitive status  -AR           Relationship/Environment    Primary Source of Support/Comfort  child(michael)  -AR        Lives With  child(michael), adult  -AR        Concerns About Impact on Relationships  Pt lives with son who works during the day  -AR           Resource/Environmental Concerns    Current Living Arrangements  home/apartment/condo  -AR        Resource/Environmental Concerns  home accessibility  -AR        Home Accessibility Concerns  stairs to enter home  -AR        Transportation Concerns  car, none  -AR           Home Main Entrance    Number of Stairs, Main Entrance  six  -AR        Stair Railings, Main Entrance  railings on both sides of stairs  -AR           Cognitive Assessment/Interventions    Additional Documentation  Cognitive Assessment/Intervention (Group)  -AR           Cognitive Assessment/Intervention- PT/OT    Affect/Mental Status (Cognitive)  flat/blunted affect  -AR        Orientation Status (Cognition)  oriented to;person;place  -AR        Follows Commands (Cognition)  follows one step commands;25-49% accuracy  -AR        Cognitive Function (Cognitive)  memory deficit;safety deficit  -AR        Memory Deficit (Cognitive)  severe deficit  -AR        Safety Deficit (Cognitive)  moderate deficit  -AR           Safety Issues, Functional Mobility    Safety Issues Affecting Function (Mobility)  judgment;problem solving;safety precaution awareness;safety precautions follow-through/compliance;ability to follow commands  -AR        Impairments Affecting Function (Mobility)  balance;cognition;endurance/activity tolerance;range of motion (ROM);pain;strength  -AR           Mobility Assessment/Treatment    Extremity Weight-bearing  Status  right lower extremity  -AR        Right Lower Extremity (Weight-bearing Status)  weight-bearing as tolerated (WBAT)  -AR           Bed Mobility Assessment/Treatment    Bed Mobility Assessment/Treatment  scooting/bridging;supine-sit  -AR        Supine-Sit [place] (Bed Mobility)  verbal cues;maximum assist (25% patient effort);2 person assist  -AR        Assistive Device (Bed Mobility)  bed rails;draw sheet;head of bed elevated;leg   -AR        Comment (Bed Mobility)  Issued ledg   and educated pt on use, however pt unable to process correct use of device.   -AR           Transfer Assessment/Treatment    Transfer Assessment/Treatment  sit-stand transfer;stand-sit transfer  -AR        Comment (Transfers)  Cues for hand placement and sequencing. Pt with posterior lean, pt had increased difficulty advancing LLE than RLE.   -AR           Bed-Chair Transfer    Bed-Chair [place] (Transfers)  verbal cues;maximum assist (25% patient effort);2 person assist  -AR        Assistive Device (Bed-Chair Transfers)  other (see comments) BUE HHA  -AR           Sit-Stand Transfer    Sit-Stand [place] (Transfers)  verbal cues;maximum assist (25% patient effort);2 person assist  -AR        Assistive Device (Sit-Stand Transfers)  other (see comments) BUE HHA  -AR           Stand-Sit Transfer    Stand-Sit [place] (Transfers)  maximum assist (25% patient effort);2 person assist;verbal cues  -AR        Assistive Device (Stand-Sit Transfers)  other (see comments) BUE HHA  -AR           ADL Assessment/Intervention    BADL Assessment/Intervention  upper body dressing;feeding;lower body dressing  -AR           Upper Body Dressing Assessment/Training    Upper Body Dressing [place] Level  don;panohemi/robe;maximum assist (25% patient effort)  -AR        Upper Body Dressing Position  edge of bed sitting  -AR           Lower Body Dressing Assessment/Training    Comment (Lower Body Dressing)  Pt not  appropriate for AE teaching d/t cognitive status.   -AR           Self-Feeding Assessment/Training    [place] Level (Feeding)  feeding skills;dependent (less than 25% patient effort)  -AR        Position (Self-Feeding)  edge of bed sitting  -AR           BADL Safety/Performance    Impairments, BADL Safety/Performance  balance;cognition;endurance/activity tolerance;pain;range of motion;strength  -AR        Cognitive Impairments, BADL Safety/Performance  judgment;problem solving/reasoning;safety precaution awareness;safety precaution follow-through;sequencing abilities  -AR           General ROM    GENERAL ROM COMMENTS  WFL BUE  -AR           MMT (Manual Muscle Testing)    General MMT Comments  Grossly WFL BUE  -AR           Motor Assessment/Interventions    Additional Documentation  Balance (Group);Balance Interventions (Group)  -AR           Balance    Balance  static sitting balance;static standing balance  -AR           Static Sitting Balance    Level of [place] (Unsupported Sitting, Static Balance)  moderate assist, 50 to 74% patient effort  -AR        Sitting Position (Unsupported Sitting, Static Balance)  sitting on edge of bed  -AR        Time Able to Maintain Position (Unsupported Sitting, Static Balance)  3 to 4 minutes  -AR           Static Standing Balance    Level of [place] (Supported Standing, Static Balance)  maximal assist, 25 to 49% patient effort;2 person assist  -AR        Time Able to Maintain Position (Supported Standing, Static Balance)  less than 15 seconds  -AR        Assistive Device Utilized (Supported Standing, Static Balance)  other (see comments) BUE support  -AR           Positioning and Restraints    Pre-Treatment Position  in bed  -AR        Post Treatment Position  chair  -AR        In Chair  reclined;call light within reach;encouraged to call for assist;exit alarm on;compression device;waffle cushion;on mechanical lift sling;legs elevated;pillow between legs  -AR            Pain Scale: Numbers Pre/Post-Treatment    Pain Scale: Numbers, Pretreatment  0/10 - no pain  -AR        Pain Scale: Numbers, Post-Treatment  0/10 - no pain  -AR           Wound 08/12/20 Right lateral hip Incision    Wound - Properties Group Date first assessed: 08/12/20  -NH Present on Hospital Admission: N  -NH Side: Right  -NH Orientation: lateral  -NH Location: hip  -NH Primary Wound Type: Incision  -NH       NPWT (Negative Pressure Wound Therapy) 08/12/20 1736 Right lateral hip    NPWT (Negative Pressure Wound Therapy) - Properties Group Placement Date: 08/12/20  -NH Placement Time: 1736 -NH Location: Right lateral hip  -NH Additional Comments: KYLEE 7 dressing  -NH       Plan of Care Review    Plan of Care Reviewed With  patient  -AR           Clinical Impression (OT)    Therapy Frequency (OT Eval)  daily  -AR        Anticipated Discharge Disposition (OT)  skilled nursing facility  -AR           Vital Signs    Pretreatment Heart Rate (beats/min)  74  -AR        Posttreatment Heart Rate (beats/min)  77  -AR        Pre SpO2 (%)  97  -AR        O2 Delivery Pre Treatment  supplemental O2  -AR        Post SpO2 (%)  97  -AR        O2 Delivery Post Treatment  supplemental O2  -AR           OT Goals    Transfer Goal Selection (OT)  transfer, OT goal 1  -AR        Dressing Goal Selection (OT)  dressing, OT goal 1  -AR        Self-Feeding Goal Selection (OT)  self feeding, OT goal 1  -AR        Balance Goal Selection (OT)  balance, OT goal 1  -AR        Additional Documentation  Self-Feeding Goal Selection (OT) (Row);Balance Goal Selection (OT) (Row)  -AR           Transfer Goal 1 (OT)    Activity/Assistive Device (Transfer Goal 1, OT)  sit-to-stand/stand-to-sit;commode, bedside without drop arms;walker, rolling  -AR        [place] Level/Cues Needed (Transfer Goal 1, OT)  verbal cues required;moderate assist (50-74% patient effort);2 person assist  -AR        Time Frame (Transfer Goal 1, OT)  short term goal  (STG);10 days  -AR        Progress/Outcome (Transfer Goal 1, OT)  goal ongoing  -AR           Dressing Goal 1 (OT)    Activity/Assistive Device (Dressing Goal 1, OT)  upper body dressing  -AR        [place]/Cues Needed (Dressing Goal 1, OT)  moderate assist (50-74% patient effort);verbal cues required  -AR        Time Frame (Dressing Goal 1, OT)  short term goal (STG);1 week  -AR        Progress/Outcome (Dressing Goal 1, OT)  goal ongoing  -AR           Self-Feeding Goal 1 (OT)    Activity/Assistive Device (Self-Feeding Goal 1, OT)  self-feeding skills, all AU PRN  -AR        [place] Level/Cues Needed (Self-Feeding Goal 1, OT)  verbal cues required;moderate assist (50-74% patient effort)  -AR        Time Frame (Self-Feeding Goal 1, OT)  short term goal (STG);10 days  -AR        Progress/Outcomes (Self-Feeding Goal 1, OT)  goal ongoing  -AR           Balance Goal 1 (OT)    Activity/Assistive Device (Balance Goal 1, OT)  sitting, static  -AR        [place] Level/Cues Needed (Balance Goal 1, OT)  verbal cues required;minimum assist (75% or more patient effort)  -AR        Time Frame (Balance Goal 1, OT)  short term goal (STG);1 week  -AR        Progress/Outcomes (Balance Goal 1, OT)  goal ongoing  -AR           Living Environment    Home Accessibility  stairs to enter home  -AR          User Key  (r) = Recorded By, (t) = Taken By, (c) = Cosigned By    Initials Name Effective Dates    Cristel Jarrell OT 06/22/15 -     Vee Cavazos RN 10/11/19 -          Occupational Therapy Education                 Title: PT OT SLP Therapies (In Progress)     Topic: Occupational Therapy (In Progress)     Point: ADL training (In Progress)     Description:   Instruct learner(s) on proper safety adaptation and remediation techniques during self care or transfers.   Instruct in proper use of assistive devices.              Learning Progress Summary           Patient Acceptance, E,D, NR by AR at 8/17/2020 0850                    Point: Precautions (In Progress)     Description:   Instruct learner(s) on prescribed precautions during self-care and functional transfers.              Learning Progress Summary           Patient Acceptance, E,D, NR by AR at 8/17/2020 0847                   Point: Body mechanics (In Progress)     Description:   Instruct learner(s) on proper positioning and spine alignment during self-care, functional mobility activities and/or exercises.              Learning Progress Summary           Patient Acceptance, E,D, NR by AR at 8/17/2020 0847                               User Key     Initials Effective Dates Name Provider Type Discipline    AR 06/22/15 -  Cristel Traore, OT Occupational Therapist OT                  OT Recommendation and Plan  Outcome Summary/Treatment Plan (OT)  Anticipated Discharge Disposition (OT): skilled nursing facility  Therapy Frequency (OT Eval): daily  Plan of Care Review  Plan of Care Reviewed With: patient  Plan of Care Reviewed With: patient  Outcome Summary: Pt POD#5 right hip hemiarthroplasty. Pt alert, following approx 50% 1-step commands with significant delay. She required max assist x2 bed mobility and transfer to chair with max assist x2. She required max assist UB dressing and dependence self-feeding. Recommend SNF-level rehab.    Outcome Measures     Row Name 08/17/20 0847             How much help from another is currently needed...    Putting on and taking off regular lower body clothing?  1  -AR      Bathing (including washing, rinsing, and drying)  1  -AR      Toileting (which includes using toilet bed pan or urinal)  1  -AR      Putting on and taking off regular upper body clothing  3  -AR      Taking care of personal grooming (such as brushing teeth)  3  -AR      Eating meals  1  -AR      AM-PAC 6 Clicks Score (OT)  10  -AR         Functional Assessment    Outcome Measure Options  AM-PAC 6 Clicks Daily Activity (OT)  -AR        User Key  (r) =  Recorded By, (t) = Taken By, (c) = Cosigned By    Initials Name Provider Type    Cristel Jarrell OT Occupational Therapist          Time Calculation:   Time Calculation- OT     Row Name 08/17/20 0847             Time Calculation- OT    OT Start Time  0847  -AR      OT Received On  08/17/20  -AR      OT Goal Re-Cert Due Date  08/27/20  -AR        User Key  (r) = Recorded By, (t) = Taken By, (c) = Cosigned By    Initials Name Provider Type    Cristel Jarrell OT Occupational Therapist        Therapy Charges for Today     Code Description Service Date Service Provider Modifiers Qty    [id number] HC OT EVAL MOD COMPLEXITY 4 8/17/2020 Cristel Traore OT GO 1               Cristel Traore OT  8/17/2020

## 2020-08-17 NOTE — PLAN OF CARE
Problem: Patient Care Overview  Goal: Plan of Care Review  Outcome: Ongoing (interventions implemented as appropriate)  Flowsheets (Taken 8/17/2020 1706)  Progress: no change  Plan of Care Reviewed With: patient  Note:   Pt remains alert to self only. Pt calm/cooperative. VSS, some HTN noted, on 2L O2. Afib on tele.  Right hip dyaln drsng CDI. Abd. pillow in use. Only requested pain medicine once this shift. Pedal pulses weak. Pt voiding via primafit external catheter. Multiple loose BMs this shift. Frequent turning, on waffle mattress. Plan for [place] Wednesday via w/c van.

## 2020-08-18 LAB
ANION GAP SERPL CALCULATED.3IONS-SCNC: 9 MMOL/L (ref 5–15)
BASOPHILS # BLD AUTO: 0.1 10*3/MM3 (ref 0–0.2)
BASOPHILS NFR BLD AUTO: 0.9 % (ref 0–1.5)
BUN SERPL-MCNC: 21 MG/DL (ref 8–23)
BUN/CREAT SERPL: 24.7 (ref 7–25)
CALCIUM SPEC-SCNC: 8.3 MG/DL (ref 8.6–10.5)
CHLORIDE SERPL-SCNC: 100 MMOL/L (ref 98–107)
CO2 SERPL-SCNC: 28 MMOL/L (ref 22–29)
CREAT SERPL-MCNC: 0.85 MG/DL (ref 0.57–1)
DEPRECATED RDW RBC AUTO: 51.4 FL (ref 37–54)
EOSINOPHIL # BLD AUTO: 0.65 10*3/MM3 (ref 0–0.4)
EOSINOPHIL NFR BLD AUTO: 5.7 % (ref 0.3–6.2)
ERYTHROCYTE [DISTWIDTH] IN BLOOD BY AUTOMATED COUNT: 14.9 % (ref 12.3–15.4)
GFR SERPL CREATININE-BSD FRML MDRD: 64 ML/MIN/1.73
GLUCOSE BLDC GLUCOMTR-MCNC: 172 MG/DL (ref 70–130)
GLUCOSE BLDC GLUCOMTR-MCNC: 174 MG/DL (ref 70–130)
GLUCOSE BLDC GLUCOMTR-MCNC: 208 MG/DL (ref 70–130)
GLUCOSE BLDC GLUCOMTR-MCNC: 217 MG/DL (ref 70–130)
GLUCOSE SERPL-MCNC: 150 MG/DL (ref 65–99)
HCT VFR BLD AUTO: 32.5 % (ref 34–46.6)
HGB BLD-MCNC: 9.6 G/DL (ref 12–15.9)
IMM GRANULOCYTES # BLD AUTO: 0.26 10*3/MM3 (ref 0–0.05)
IMM GRANULOCYTES NFR BLD AUTO: 2.3 % (ref 0–0.5)
LYMPHOCYTES # BLD AUTO: 0.75 10*3/MM3 (ref 0.7–3.1)
LYMPHOCYTES NFR BLD AUTO: 6.6 % (ref 19.6–45.3)
MCH RBC QN AUTO: 28 PG (ref 26.6–33)
MCHC RBC AUTO-ENTMCNC: 29.5 G/DL (ref 31.5–35.7)
MCV RBC AUTO: 94.8 FL (ref 79–97)
MONOCYTES # BLD AUTO: 1.6 10*3/MM3 (ref 0.1–0.9)
MONOCYTES NFR BLD AUTO: 14.1 % (ref 5–12)
NEUTROPHILS NFR BLD AUTO: 7.95 10*3/MM3 (ref 1.7–7)
NEUTROPHILS NFR BLD AUTO: 70.4 % (ref 42.7–76)
NRBC BLD AUTO-RTO: 0 /100 WBC (ref 0–0.2)
PLATELET # BLD AUTO: 184 10*3/MM3 (ref 140–450)
PMV BLD AUTO: 11.3 FL (ref 6–12)
POTASSIUM SERPL-SCNC: 4.5 MMOL/L (ref 3.5–5.2)
RBC # BLD AUTO: 3.43 10*6/MM3 (ref 3.77–5.28)
SODIUM SERPL-SCNC: 137 MMOL/L (ref 136–145)
WBC # BLD AUTO: 11.31 10*3/MM3 (ref 3.4–10.8)

## 2020-08-18 PROCEDURE — 97530 THERAPEUTIC ACTIVITIES: CPT

## 2020-08-18 PROCEDURE — 97535 SELF CARE MNGMENT TRAINING: CPT | Performed by: OCCUPATIONAL THERAPIST

## 2020-08-18 PROCEDURE — 97110 THERAPEUTIC EXERCISES: CPT

## 2020-08-18 PROCEDURE — 99232 SBSQ HOSP IP/OBS MODERATE 35: CPT | Performed by: HOSPITALIST

## 2020-08-18 PROCEDURE — 80048 BASIC METABOLIC PNL TOTAL CA: CPT | Performed by: INTERNAL MEDICINE

## 2020-08-18 PROCEDURE — 63710000001 INSULIN LISPRO (HUMAN) PER 5 UNITS: Performed by: INTERNAL MEDICINE

## 2020-08-18 PROCEDURE — 63710000001 INSULIN LISPRO (HUMAN) PER 5 UNITS: Performed by: ORTHOPAEDIC SURGERY

## 2020-08-18 PROCEDURE — 85025 COMPLETE CBC W/AUTO DIFF WBC: CPT | Performed by: INTERNAL MEDICINE

## 2020-08-18 PROCEDURE — 63710000001 INSULIN DETEMIR PER 5 UNITS: Performed by: INTERNAL MEDICINE

## 2020-08-18 PROCEDURE — 82962 GLUCOSE BLOOD TEST: CPT

## 2020-08-18 RX ADMIN — INSULIN DETEMIR 10 UNITS: 100 INJECTION, SOLUTION SUBCUTANEOUS at 20:36

## 2020-08-18 RX ADMIN — INSULIN LISPRO 2 UNITS: 100 INJECTION, SOLUTION INTRAVENOUS; SUBCUTANEOUS at 08:50

## 2020-08-18 RX ADMIN — INSULIN LISPRO 5 UNITS: 100 INJECTION, SOLUTION INTRAVENOUS; SUBCUTANEOUS at 17:50

## 2020-08-18 RX ADMIN — HYDROCODONE BITARTRATE AND ACETAMINOPHEN 1 TABLET: 5; 325 TABLET ORAL at 00:43

## 2020-08-18 RX ADMIN — INSULIN LISPRO 2 UNITS: 100 INJECTION, SOLUTION INTRAVENOUS; SUBCUTANEOUS at 20:36

## 2020-08-18 RX ADMIN — HYDROCODONE BITARTRATE AND ACETAMINOPHEN 1 TABLET: 5; 325 TABLET ORAL at 17:51

## 2020-08-18 RX ADMIN — INSULIN LISPRO 5 UNITS: 100 INJECTION, SOLUTION INTRAVENOUS; SUBCUTANEOUS at 08:49

## 2020-08-18 RX ADMIN — RIVAROXABAN 15 MG: 15 TABLET, FILM COATED ORAL at 17:50

## 2020-08-18 RX ADMIN — ACETAMINOPHEN 650 MG: 325 TABLET, FILM COATED ORAL at 08:49

## 2020-08-18 RX ADMIN — TAMSULOSIN HYDROCHLORIDE 0.4 MG: 0.4 CAPSULE ORAL at 08:49

## 2020-08-18 RX ADMIN — INSULIN LISPRO 3 UNITS: 100 INJECTION, SOLUTION INTRAVENOUS; SUBCUTANEOUS at 17:51

## 2020-08-18 RX ADMIN — INSULIN LISPRO 3 UNITS: 100 INJECTION, SOLUTION INTRAVENOUS; SUBCUTANEOUS at 12:05

## 2020-08-18 RX ADMIN — INSULIN LISPRO 5 UNITS: 100 INJECTION, SOLUTION INTRAVENOUS; SUBCUTANEOUS at 12:05

## 2020-08-18 RX ADMIN — HYDROCODONE BITARTRATE AND ACETAMINOPHEN 1 TABLET: 5; 325 TABLET ORAL at 10:44

## 2020-08-18 RX ADMIN — RIVAROXABAN 15 MG: 15 TABLET, FILM COATED ORAL at 08:49

## 2020-08-18 RX ADMIN — ATENOLOL 50 MG: 50 TABLET ORAL at 08:50

## 2020-08-18 NOTE — PLAN OF CARE
Problem: Patient Care Overview  Goal: Plan of Care Review  8/18/2020 1556 by Maykel Matthew, PT  Flowsheets  Taken 8/18/2020 1556  Progress: improving  Plan of Care Reviewed With: patient  Taken 8/18/2020 1550  Outcome Summary: Patient willing to participate in therapy. She was able to stand and take a few steps with max assist.

## 2020-08-18 NOTE — THERAPY TREATMENT NOTE
"Acute Care - Occupational Therapy Treatment Note  [facility]     Patient Name: Analilia De Leon  : 1935  MRN: [id number]  Today's Date: 2020             Admit Date: 2020       ICD-10-CM ICD-9-CM   1. Hyperkalemia E87.5 276.7   2. Impaired mobility and ADLs Z74.09 V49.89    Z78.9      Patient Active Problem List   Diagnosis   • Iron deficiency anemia   • Hypertension   • Atrial fibrillation (CMS/HCC)   • Hyperlipidemia   • CAD (coronary artery disease)   • Hip fracture (CMS/HCC)   • Acute UTI (urinary tract infection)   • Altered mental status   • Fall   • Status post hip hemiarthroplasty   • Acute respiratory failure with hypoxia (CMS/HCC)   • Acute pulmonary embolism (CMS/HCC)   • Pulmonary hypertension (CMS/HCC)   • Tricuspid regurgitation     Past Medical History:   Diagnosis Date   • Ataxia 2017   • Atrial fibrillation (CMS/HCC)    • BPPV (benign paroxysmal positional vertigo) 2017   • Chronic anticoagulation    • Facial numbness     takes Keppra for \"facial numbness\"    • History of blood transfusion    • Hypertension    • Microangiopathy (CMS/HCC) 2017   • T2DM (type 2 diabetes mellitus) (CMS/[facility])    • TIA (transient ischemic attack) 3/17/2017   • Weakness 2017     Past Surgical History:   Procedure Laterality Date   • CHOLECYSTECTOMY     • ENDOSCOPY  2015    showed 2 ulcerated lesions in the gastric antrum   • HIP HEMIARTHROPLASTY Right 2020    Procedure: HIP BI-POLAR RIGHT;  Surgeon: Ramirez Lopez MD;  Location: [facility];  Service: Orthopedics;  Laterality: Right;   • HYSTERECTOMY     • KNEE SURGERY Right    • OOPHORECTOMY         Therapy Treatment    Rehabilitation Treatment Summary     Row Name 20 1700             Treatment Time/Intention    Discipline  occupational therapist  -AR      Document Type  therapy note (daily note) POD#6   -AR      Existing Precautions/Restrictions  fall;right;hip, posterior;other (see comments) dementia  -AR      Recorded by " [AR] Cristel Traore, OT 08/18/20 1801      Row Name 08/18/20 1700             Cognitive Assessment/Intervention- PT/OT    Affect/Mental Status (Cognitive)  WNL  -AR      Orientation Status (Cognition)  oriented to;person;place  -AR      Follows Commands (Cognition)  follows one step commands;over 90% accuracy;verbal cues/prompting required  -AR      Memory Deficit (Cognitive)  moderate deficit  -AR      Safety Deficit (Cognitive)  moderate deficit  -AR      Recorded by [AR] Cristel Traore, OT 08/18/20 1801      Row Name 08/18/20 1700             Safety Issues, Functional Mobility    Safety Issues Affecting Function (Mobility)  judgment;problem solving;safety precaution awareness;safety precautions follow-through/compliance;sequencing abilities  -AR      Impairments Affecting Function (Mobility)  balance;cognition;endurance/activity tolerance;pain;strength  -AR      Recorded by [AR] Cristel Traore, OT 08/18/20 1801      Row Name 08/18/20 1700             Mobility Assessment/Intervention    Extremity Weight-bearing Status  right lower extremity  -AR      Right Lower Extremity (Weight-bearing Status)  weight-bearing as tolerated (WBAT)  -AR      Recorded by [AR] Cristel Traore, OT 08/18/20 1801      Row Name 08/18/20 1700             Bed Mobility Assessment/Treatment    Bed Mobility Assessment/Treatment  scooting/bridging;sit-supine;rolling left;rolling right  -AR      Rolling Left [place] (Bed Mobility)  maximum assist (25% patient effort);2 person assist;verbal cues  -AR      Rolling Right [place] (Bed Mobility)  maximum assist (25% patient effort);2 person assist;verbal cues  -AR      Scooting/Bridging [place] (Bed Mobility)  dependent (less than 25% patient effort);2 person assist;verbal cues  -AR      Sit-Supine [place] (Bed Mobility)  dependent (less than 25% patient effort);2 person assist;verbal cues  -AR      Bed Mobility, Safety Issues  decreased use of legs for  bridging/pushing;impaired trunk control for bed mobility  -AR      Assistive Device (Bed Mobility)  bed rails;draw sheet  -AR2      Recorded by [AR] Cristel Traore OT 08/18/20 1801  [AR2] Cristel Traore OT 08/18/20 1805      Row Name 08/18/20 1700             Functional Mobility    Functional Mobility- Ind. Level  unable to perform  -AR      Recorded by [AR] Cristel Tarore OT 08/18/20 1801      Row Name 08/18/20 1700             Transfer Assessment/Treatment    Transfer Assessment/Treatment  sit-stand transfer;stand-sit transfer;chair-bed transfer  -AR      Comment (Transfers)  Pt transitioned sit-to-stand and was unable to advance RLE. Pt returned to sitting and following rest break, pt able to perform pivot transfer with max assist x2 and BUE.   -AR      Recorded by [AR] Cristel Traore OT 08/18/20 1801      Row Name 08/18/20 1700             Chair-Bed Transfer    Chair-Bed Yell (Transfers)  maximum assist (25% patient effort);2 person assist  -AR      Assistive Device (Chair-Bed Transfers)  other (see comments) BUE support  -AR      Recorded by [AR] Cristel Traore OT 08/18/20 1801      Row Name 08/18/20 1700             Sit-Stand Transfer    Sit-Stand Yell (Transfers)  maximum assist (25% patient effort);2 person assist;verbal cues  -AR      Assistive Device (Sit-Stand Transfers)  other (see comments) BUE support  -AR      Recorded by [AR] Cristel Traore OT 08/18/20 1801      Row Name 08/18/20 1700             Stand-Sit Transfer    Stand-Sit Yell (Transfers)  maximum assist (25% patient effort);2 person assist;verbal cues  -AR      Assistive Device (Stand-Sit Transfers)  other (see comments) BUE support  -AR      Recorded by [AR] Cristel Traore OT 08/18/20 1801      Row Name 08/18/20 1700             ADL Assessment/Intervention    38368 - OT Self Care/Mgmt Minutes  41  -AR      BADL Assessment/Intervention  bathing;upper body dressing;lower body  dressing  -AR      Recorded by [AR] Cristel Traore, OT 08/18/20 1801      Row Name 08/18/20 1700             Bathing Assessment/Intervention    Bathing [place] Level  bathing skills;lower body  -AR      Comment (Bathing)  Educated pt on RLE PHP and precautions and incorporation into LB ADL routine. Reviewed use of LH sponge to assist.   -AR      Recorded by [AR] Cristel Traore, OT 08/18/20 1801      Row Name 08/18/20 1700             Upper Body Dressing Assessment/Training    Upper Body Dressing [place] Level  doff;pajama/robe;moderate assist (50% patient effort)  -AR      Upper Body Dressing Position  edge of bed sitting  -AR      Recorded by [AR] Cristel Traore, OT 08/18/20 1801      Row Name 08/18/20 1700             Lower Body Dressing Assessment/Training    Lower Body Dressing [place] Level  don;doff;socks;moderate assist (50% patient effort)  -AR      Assistive Devices (Lower Body Dressing)  reacher;sock-aid  -AR      Lower Body Dressing Position  supported sitting  -AR      Comment (Lower Body Dressing)  Educated pt on RLE PHP and incorporation into ADL routine, reviewed use of AE. Limited carry-over.   -AR      Recorded by [AR] Cristel Traore, OT 08/18/20 1805      Row Name 08/18/20 1700             BADL Safety/Performance    Impairments, BADL Safety/Performance  balance;cognition;endurance/activity tolerance;pain;strength  -AR      Cognitive Impairments, BADL Safety/Performance  judgment;problem solving/reasoning;safety precaution awareness;safety precaution follow-through;sequencing abilities  -AR      Skilled BADL Treatment/Intervention  adaptive equipment training;BADL process/adaptation training;compensatory training;jonathan/one-hand technique  -AR      Progress in BADL Status  improvement noted  -AR      Recorded by [AR] Cristel Traore, OT 08/18/20 1805      Row Name 08/18/20 1700             Static Sitting Balance    Level of [place] (Unsupported Sitting,  Static Balance)  contact guard assist  -AR      Sitting Position (Unsupported Sitting, Static Balance)  sitting in chair  -AR      Recorded by [AR] Cristel Traore OT 08/18/20 1805      Row Name 08/18/20 1700             Static Standing Balance    Level of [place] (Supported Standing, Static Balance)  maximal assist, 25 to 49% patient effort;2 person assist  -AR      Time Able to Maintain Position (Supported Standing, Static Balance)  other (see comments) BUE support  -AR      Recorded by [AR] Cristel Traore OT 08/18/20 1805      Row Name 08/18/20 1700             Positioning and Restraints    Pre-Treatment Position  sitting in chair/recliner  -AR      Post Treatment Position  bed  -AR      In Bed  notified nsg;supine;call light within reach;encouraged to call for assist;exit alarm on;SCD pump applied;pillow between legs  -AR      Recorded by [AR] Cristel Traore OT 08/18/20 1805      Row Name 08/18/20 1700             Pain Scale: Numbers Pre/Post-Treatment    Pain Location - Side  Right  -AR      Pain Location - Orientation  generalized  -AR      Pain Location  hip  -AR      Pain Intervention(s)  Repositioned;Ambulation/increased activity nitified nurse of pt request for pain medication  -AR      Recorded by [AR] Cristel Traore OT 08/18/20 1805      Row Name 08/18/20 1700             Pain Scale: FACES Pre/Post-Treatment    Pain: FACES Scale, Pretreatment  2-->hurts little bit  -AR      Pain: FACES Scale, Post-Treatment  4-->hurts little more  -AR      Recorded by [AR] Cristel Traore OT 08/18/20 1805      Row Name                Wound 08/12/20 Right lateral hip Incision    Wound - Properties Group Date first assessed: 08/12/20 [NH] Present on Hospital Admission: N [NH] Side: Right [NH] Orientation: lateral [NH] Location: hip [NH] Primary Wound Type: Incision [NH] Recorded by:  [NH] Vee Shah RN 08/12/20 3081    Row Name                NPWT (Negative Pressure Wound Therapy)  08/12/20 1736 Right lateral hip    NPWT (Negative Pressure Wound Therapy) - Properties Group Placement Date: 08/12/20 [NH] Placement Time: 1736 [NH] Location: Right lateral hip [NH] Additional Comments: KYLEE 7 dressing [NH] Recorded by:  [NH] Vee Shah RN 08/12/20 1737    Row Name 08/18/20 1700             Plan of Care Review    Plan of Care Reviewed With  patient  -AR      Progress  improving  -AR      Recorded by [AR] Cristel Traore, OT 08/18/20 1805      Row Name 08/18/20 1700             Outcome Summary/Treatment Plan (OT)    Daily Summary of Progress (OT)  progress toward functional goals as expected  -AR      Anticipated Discharge Disposition (OT)  skilled nursing facility  -AR      Recorded by [AR] Cristel Traore, OT 08/18/20 1805        User Key  (r) = Recorded By, (t) = Taken By, (c) = Cosigned By    Initials Name Effective Dates Discipline    AR Cristel Traore, OT 06/22/15 -  OT    NH Vee Shah RN 10/11/19 -  Nurse        Wound 08/12/20 Right lateral hip Incision (Active)   Dressing Appearance dry;intact;no drainage 8/18/2020  6:00 PM   Closure FAHAD 8/18/2020 12:00 PM   Base dressing in place, unable to visualize 8/18/2020 12:00 PM   Drainage Amount none 8/18/2020  6:00 PM   Dressing Care, Wound border dressing 8/17/2020  7:35 PM       NPWT (Negative Pressure Wound Therapy) 08/12/20 1736 Right lateral hip (Active)   Therapy Setting continuous therapy 8/18/2020  4:00 PM   Dressing foam, white 8/18/2020  4:00 PM     Rehab Goal Summary     Row Name 08/18/20 1700             Transfer Goal 1 (OT)    Progress/Outcome (Transfer Goal 1, OT)  goal ongoing  -AR         Dressing Goal 1 (OT)    Progress/Outcome (Dressing Goal 1, OT)  goal met  -AR         Self-Feeding Goal 1 (OT)    Progress/Outcomes (Self-Feeding Goal 1, OT)  goal ongoing  -AR         Balance Goal 1 (OT)    Progress/Outcomes (Balance Goal 1, OT)  goal met  -AR        User Key  (r) = Recorded By, (t) = Taken By, (c) =  Cosigned By    Initials Name Provider Type Discipline    Cristel Jarrell OT Occupational Therapist OT        Occupational Therapy Education                 Title: PT OT SLP Therapies (In Progress)     Topic: Occupational Therapy (Done)     Point: ADL training (Done)     Description:   Instruct learner(s) on proper safety adaptation and remediation techniques during self care or transfers.   Instruct in proper use of assistive devices.              Learning Progress Summary           Patient Acceptance, E,TB,D, VU,NR by AR at 8/18/2020 1700    Acceptance, E,D, NR by AR at 8/17/2020 0847                   Point: Precautions (Done)     Description:   Instruct learner(s) on prescribed precautions during self-care and functional transfers.              Learning Progress Summary           Patient Acceptance, E,TB,D, VU,NR by AR at 8/18/2020 1700    Acceptance, E,D, NR by AR at 8/17/2020 0847                   Point: Body mechanics (Done)     Description:   Instruct learner(s) on proper positioning and spine alignment during self-care, functional mobility activities and/or exercises.              Learning Progress Summary           Patient Acceptance, E,TB,D, VU,NR by AR at 8/18/2020 1700    Acceptance, E,D, NR by AR at 8/17/2020 0847                               User Key     Initials Effective Dates Name Provider Type Discipline    AR 06/22/15 -  Cristel Traore OT Occupational Therapist OT                OT Recommendation and Plan  Outcome Summary/Treatment Plan (OT)  Daily Summary of Progress (OT): progress toward functional goals as expected  Anticipated Discharge Disposition (OT): skilled nursing facility  Therapy Frequency (OT Eval): daily  Daily Summary of Progress (OT): progress toward functional goals as expected  Plan of Care Review  Plan of Care Reviewed With: patient  Plan of Care Reviewed With: patient  Outcome Summary: OT reviewed RLE PHP and incorporation into ADL routine as well as transfer  training. She completed LB dressing with AE with mod assist, transfer training with max assist x2 and bed mobility with max assist x2. Recommend SNF-level rehab.  Outcome Measures     Row Name 08/18/20 1700 08/17/20 0847          How much help from another is currently needed...    Putting on and taking off regular lower body clothing?  2  -AR  1  -AR     Bathing (including washing, rinsing, and drying)  2  -AR  1  -AR     Toileting (which includes using toilet bed pan or urinal)  2  -AR  1  -AR     Putting on and taking off regular upper body clothing  3  -AR  3  -AR     Taking care of personal grooming (such as brushing teeth)  3  -AR  3  -AR     Eating meals  3  -AR  1  -AR     AM-PAC 6 Clicks Score (OT)  15  -AR  10  -AR        Functional Assessment    Outcome Measure Options  AM-PAC 6 Clicks Daily Activity (OT)  -AR  AM-PAC 6 Clicks Daily Activity (OT)  -AR       User Key  (r) = Recorded By, (t) = Taken By, (c) = Cosigned By    Initials Name Provider Type    Cristel Jarrell OT Occupational Therapist           Time Calculation:   Time Calculation- OT     Row Name 08/18/20 1700 08/18/20 1506          Time Calculation- OT    OT Start Time  1700  -AR  --     Total Timed Code Minutes- OT  41 minute(s)  -AR  --     OT Received On  08/18/20  -AR  --     OT Goal Re-Cert Due Date  08/27/20  -AR  --        Timed Charges    66088 - Gait Training Minutes   --  5  -SC     05147 - OT Self Care/Mgmt Minutes  41  -AR  --       User Key  (r) = Recorded By, (t) = Taken By, (c) = Cosigned By    Initials Name Provider Type    SC Maykel Matthew, PT Physical Therapist    Cristel Jarrell OT Occupational Therapist        Therapy Charges for Today     Code Description Service Date Service Provider Modifiers Qty    [id number]  OT EVAL MOD COMPLEXITY 4 8/17/2020 Cristel Traore OT GO 1    [id number]  OT SELF CARE/MGMT/TRAIN EA 15 MIN 8/18/2020 Cristel Traore OT GO 3    [id number]  OT THER SUPP EA 15  MIN 8/18/2020 Cristel Traore, OT GO 2               Cristel Traore, OT  8/18/2020

## 2020-08-18 NOTE — PROGRESS NOTES
"Analilia CARCAMO [facility]  [id number]  1935    /98 (BP Location: Right arm, Patient Position: Lying)   Pulse 102   Temp 97.9 °F (36.6 °C) (Oral)   Resp 16   Ht 154.9 cm (61\")   Wt 59 kg (130 lb)   SpO2 98%   BMI 24.56 kg/m²     Lab Results (last 24 hours)     Procedure Component Value Units Date/Time    POC Glucose Once [[id number]]  (Abnormal) Collected:  08/18/20 1136    Specimen:  Blood Updated:  08/18/20 1157     Glucose 217 mg/dL     Basic Metabolic Panel [[id number]]  (Abnormal) Collected:  08/18/20 0654    Specimen:  Blood Updated:  08/18/20 0921     Glucose 150 mg/dL      BUN 21 mg/dL      Creatinine 0.85 mg/dL      Sodium 137 mmol/L      Potassium 4.5 mmol/L      Comment: Specimen hemolyzed.  Results may be affected.        Chloride 100 mmol/L      CO2 28.0 mmol/L      Calcium 8.3 mg/dL      eGFR Non African Amer 64 mL/min/1.73      BUN/Creatinine Ratio 24.7     Anion Gap 9.0 mmol/L     Narrative:       GFR Normal >60  Chronic Kidney Disease <60  Kidney Failure <15      CBC & Differential [[id number]] Collected:  08/18/20 0654    Specimen:  Blood Updated:  08/18/20 0823    Narrative:       The following orders were created for panel order CBC & Differential.  Procedure                               Abnormality         Status                     ---------                               -----------         ------                     CBC Auto Differential[[id number]]        Abnormal            Final result                 Please view results for these tests on the individual orders.    CBC Auto Differential [[id number]]  (Abnormal) Collected:  08/18/20 0654    Specimen:  Blood Updated:  08/18/20 0823     WBC 11.31 10*3/mm3      RBC 3.43 10*6/mm3      Hemoglobin 9.6 g/dL      Hematocrit 32.5 %      MCV 94.8 fL      MCH 28.0 pg      MCHC 29.5 g/dL      RDW 14.9 %      RDW-SD 51.4 fl      MPV 11.3 fL      Platelets 184 10*3/mm3      Neutrophil % 70.4 %      Lymphocyte % 6.6 %      Monocyte % 14.1 %      Eosinophil " % 5.7 %      Basophil % 0.9 %      Immature Grans % 2.3 %      Neutrophils, Absolute 7.95 10*3/mm3      Lymphocytes, Absolute 0.75 10*3/mm3      Monocytes, Absolute 1.60 10*3/mm3      Eosinophils, Absolute 0.65 10*3/mm3      Basophils, Absolute 0.10 10*3/mm3      Immature Grans, Absolute 0.26 10*3/mm3      nRBC 0.0 /100 WBC     POC Glucose Once [[id number]]  (Abnormal) Collected:  08/18/20 0755    Specimen:  Blood Updated:  08/18/20 0806     Glucose 174 mg/dL     POC Glucose Once [[id number]]  (Abnormal) Collected:  08/17/20 2048    Specimen:  Blood Updated:  08/17/20 2050     Glucose 215 mg/dL     POC Glucose Once [[id number]]  (Abnormal) Collected:  08/17/20 1621    Specimen:  Blood Updated:  08/17/20 1632     Glucose 174 mg/dL     COVID PRE-OP / PRE-PROCEDURE SCREENING ORDER (NO ISOLATION) - Swab, Nasopharynx [[id number]] Collected:  08/17/20 1445    Specimen:  Swab from Nasopharynx Updated:  08/17/20 1518    Narrative:       The following orders were created for panel order COVID PRE-OP / PRE-PROCEDURE SCREENING ORDER (NO ISOLATION) - Swab, Nasopharynx.  Procedure                               Abnormality         Status                     ---------                               -----------         ------                     COVID-19, ABBOTT IN-HOUS...[[id number]]  Normal              Final result                 Please view results for these tests on the individual orders.    COVID-19, ABBOTT IN-HOUSE,NP Swab (NO TRANSPORT MEDIA) 2 HR TAT - Swab, Nasopharynx [[id number]]  (Normal) Collected:  08/17/20 1445    Specimen:  Swab from Nasopharynx Updated:  08/17/20 1518     COVID19 Not Detected    Narrative:       Fact sheet for providers: https://www.fda.gov/media/904563/download     Fact sheet for patients: https://www.fda.gov/media/977499/download          Patient Care Team:  Sravan Franco APRN as PCP - General (Family Medicine)    SUBJECTIVE  Pain well controlled    PHYSICAL EXAM  No major changes to  neurovascular status      Hip fracture (CMS/HCC)    Iron deficiency anemia    Hypertension    Atrial fibrillation (CMS/HCC)    Hyperlipidemia    CAD (coronary artery disease)    Acute UTI (urinary tract infection)    Altered mental status    Fall    Status post hip hemiarthroplasty    Acute respiratory failure with hypoxia (CMS/HCC)    Acute pulmonary embolism (CMS/HCC)    Pulmonary hypertension (CMS/HCC)    Tricuspid regurgitation      PLAN / DISPOSITION:  May transfer to [facility] when arrangements complete    Ramirez Lopez MD  08/18/20  12:56

## 2020-08-18 NOTE — PLAN OF CARE
Problem: Patient Care Overview  Goal: Plan of Care Review  Outcome: Ongoing (interventions implemented as appropriate)  Flowsheets (Taken 8/18/2020 4443)  Progress: improving  Plan of Care Reviewed With: patient  Outcome Summary: Pt. alert to self, but more clear today. Dressing to hip c/d/i. VSS, RA. C/o pain with po pain meds given. Ax2 walker/GB. Voiding well this shift, low bladder scan volumes. Will d/c to rehab tomorrow afternoon, CM following.  Goal: Individualization and Mutuality  Outcome: Ongoing (interventions implemented as appropriate)  Goal: Discharge Needs Assessment  Outcome: Ongoing (interventions implemented as appropriate)  Goal: Interprofessional Rounds/Family Conf  Outcome: Ongoing (interventions implemented as appropriate)     Problem: Fracture Orthopaedic (Adult)  Goal: Signs and Symptoms of Listed Potential Problems Will be Absent, Minimized or Managed (Fracture Orthopaedic)  Outcome: Ongoing (interventions implemented as appropriate)     Problem: Skin Injury Risk (Adult)  Goal: Identify Related Risk Factors and Signs and Symptoms  Outcome: Ongoing (interventions implemented as appropriate)  Goal: Skin Health and Integrity  Outcome: Ongoing (interventions implemented as appropriate)     Problem: Fall Risk (Adult)  Goal: Identify Related Risk Factors and Signs and Symptoms  Outcome: Ongoing (interventions implemented as appropriate)  Goal: Absence of Fall  Outcome: Ongoing (interventions implemented as appropriate)     Problem: Pain, Acute (Adult)  Goal: Identify Related Risk Factors and Signs and Symptoms  Outcome: Ongoing (interventions implemented as appropriate)  Goal: Acceptable Pain Control/Comfort Level  Outcome: Ongoing (interventions implemented as appropriate)

## 2020-08-18 NOTE — PROGRESS NOTES
"                  Clinical Nutrition     Nutrition Assessment  Reason for Visit:   LOS      Patient Name: Analilia De Leon  YOB: 1935  MRN: [id number]  Date of Encounter: 08/18/20 09:23  Admission date: 8/11/2020    Comments:  Recommend adding daily MVI     Nutrition Assessment   Assessment     Admission diagnosis  Hip fracture (CMS/HCC)    Additional applicable diagnosis/conditions/procedures this adm:  Acute respiratory failure with hypoxia (CMS/HCC)  Acute pulmonary embolism (CMS/HCC)  Flash pulmonary edema  Severe Pulmonary HTN  Sepsis (tachycardic, leukocytosis)- resolved  Altered mental status  Ecoli UTI  Iron deficiency anemia  Acute UTI (urinary tract infection)  Altered mental statusFall  (8/12) Status post hip hemiarthroplasty  NPWT (Negative Pressure Wound Therapy) Rt lateral hip       Applicable PMH/PSxH:   Hypertension  Atrial fibrillation (CMS/HCC)  Hyperlipidemia  CAD (coronary artery disease)      PMH: She  has a past medical history of 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   PSxH: She  has a past surgical history that includes Esophagogastroduodenoscopy (04/06/2015); Oophorectomy; Knee surgery (Right); Hysterectomy; Cholecystectomy; and Hip hemiArthroplasty (Right, 8/12/2020).       Reported/Observed/Food/Nutrition Related History:     Confusion still noted. Appears plans to d/c to [facility] in [place] tomorrow. RD attempted to call pts family however received no answer. RD notes ONS order in place.     Anthropometrics     Height: 154.9 cm (61\")  Last filed wt: Weight: 59 kg (130 lb) (08/13/20 1532)  Weight Method: Stated    BMI: BMI (Calculated): 24.6  Normal: 18.5-24.9kg/m2    Ideal Body " Weight (IBW) (kg): 48.15    Last 15 Recorded Weights   Weight Weight (kg) Weight (lbs) Weight Method   8/13/2020 58.968 kg 130 lb -   8/12/2020 58.968 kg 130 lb Stated   6/6/2017 59.875 kg 132 lb -   6/2/2017 60.782 kg 134 lb -   12/2/2016 60.328 kg 133 lb -       Labs reviewed     Results from last 7 days   Lab Units 08/18/20  0654 08/17/20  0819 08/16/20  0649  08/11/20  2028   GLUCOSE mg/dL 150* 124* 205*   < > 178*   BUN mg/dL 21 24* 33*   < > 13   CREATININE mg/dL 0.85 0.78 0.88   < > 0.99   SODIUM mmol/L 137 137 136   < > 138   CHLORIDE mmol/L 100 104 100   < > 106   POTASSIUM mmol/L 4.5 3.9 4.1   < > 5.6*   MAGNESIUM mg/dL  --   --   --   --  1.6   ALT (SGPT) U/L  --   --   --   --  15    < > = values in this interval not displayed.     Results from last 7 days   Lab Units 08/11/20  2028   ALBUMIN g/dL 4.10         Results from last 7 days   Lab Units 08/18/20  0755 08/17/20  2048 08/17/20  1621 08/17/20  1232 08/17/20  0719 08/16/20  1959   GLUCOSE mg/dL 174* 215* 174* 252* 125 189*     Lab Results   Lab Value Date/Time    HGBA1C 6.80 (H) 08/12/2020 0344    HGBA1C 6.2 (H) 04/05/2016 1050         Medications reviewed   Pertinent:  PIYUSH Farfan      Intake/Ouptut 24 hrs (7:00AM - 6:59 AM)     Intake & Output (last day)       08/17 0701 - 08/18 0700 08/18 0701 - 08/19 0700    P.O. 500     Total Intake(mL/kg) 500 (8.5)     Urine (mL/kg/hr) 1050 (0.7)     Stool 0     Total Output 1050     Net -550           Stool Unmeasured Occurrence 3 x           Current Nutrition Prescription     PO: Diet Regular    Nutrition Supplements:       Dietary Nutrition Supplements Boost Glucose Control (TID)      Intake: 31% x 8 meals            Nutrition Diagnosis     8/18  Problem Inadequate oral intake   Etiology ?AMS   Signs/Symptoms 31% x 8 meals        Nutrition Intervention     1.  Follow treatment progress, Care plan reviewed  2. Will continue to send ONS as ordered   3. Recommend adding daily MVI     Goal:   General:  Nutrition support treatment  PO: Increase intake  Additional goals:  Assist in wound healing promotion  Meet 100% RDI    Monitoring/Evaluation:   Per protocol, PO intake, Supplement intake, Skin status      Will Continue to follow per protocol      Ivett Orta RDN, LD, CNSC  Time Spent: 25min

## 2020-08-18 NOTE — PLAN OF CARE
Patient oriented to self this shift. VSS. Dressing CDI. PRN norco administered for pain. Up to bedside commode x2 assist. Patient did not sleep much during shift. Planning for [facility] Wednesday.

## 2020-08-18 NOTE — PLAN OF CARE
Problem: Patient Care Overview  Goal: Plan of Care Review  Flowsheets  Taken 8/18/2020 1700 by Cristel Traore OT  Progress: improving  Taken 8/18/2020 1800 by Marilyn Chairez RN  Plan of Care Reviewed With: patient  Taken 8/18/2020 1806 by Cristel Traore OT  Outcome Summary: OT reviewed RLE PHP and incorporation into ADL routine as well as transfer training. She completed LB dressing with AE with mod assist, transfer training with max assist x2 and bed mobility with max assist x2. Recommend SNF-level rehab.

## 2020-08-18 NOTE — THERAPY TREATMENT NOTE
"Patient Name: Analilia De Leon  : 1935    MRN: [id number]                              Today's Date: 2020       Admit Date: 2020    Visit Dx:     ICD-10-CM ICD-9-CM   1. Hyperkalemia E87.5 276.7   2. Impaired mobility and ADLs Z74.09 V49.89    Z78.9      Patient Active Problem List   Diagnosis   • Iron deficiency anemia   • Hypertension   • Atrial fibrillation (CMS/HCC)   • Hyperlipidemia   • CAD (coronary artery disease)   • Hip fracture (CMS/HCC)   • Acute UTI (urinary tract infection)   • Altered mental status   • Fall   • Status post hip hemiarthroplasty   • Acute respiratory failure with hypoxia (CMS/HCC)   • Acute pulmonary embolism (CMS/HCC)   • Pulmonary hypertension (CMS/HCC)   • Tricuspid regurgitation     Past Medical History:   Diagnosis Date   • Ataxia 2017   • Atrial fibrillation (CMS/HCC)    • BPPV (benign paroxysmal positional vertigo) 2017   • Chronic anticoagulation    • Facial numbness     takes Keppra for \"facial numbness\"    • History of blood transfusion    • Hypertension    • Microangiopathy (CMS/HCC) 2017   • T2DM (type 2 diabetes mellitus) (CMS/HCC)    • TIA (transient ischemic attack) 3/17/2017   • Weakness 2017     Past Surgical History:   Procedure Laterality Date   • CHOLECYSTECTOMY     • ENDOSCOPY  2015    showed 2 ulcerated lesions in the gastric antrum   • HIP HEMIARTHROPLASTY Right 2020    Procedure: HIP BI-POLAR RIGHT;  Surgeon: Ramirez Lopez MD;  Location: [facility];  Service: Orthopedics;  Laterality: Right;   • HYSTERECTOMY     • KNEE SURGERY Right    • OOPHORECTOMY       General Information     Row Name 20 1539          PT Evaluation Time/Intention    Document Type  therapy note (daily note)  -SC     Mode of Treatment  physical therapy  -SC     Row Name 20 1539          General Information    Patient Profile Reviewed?  yes  -SC     Existing Precautions/Restrictions  fall;hip, posterior;right  -SC     Row Name 20 1539    "       Cognitive Assessment/Intervention- PT/OT    Orientation Status (Cognition)  oriented to;person;place  -SC     Cognitive Assessment/Intervention Comment  alert, following commands, fearful of falling  -SC     Row Name 08/18/20 1539          Safety Issues, Functional Mobility    Safety Issues Affecting Function (Mobility)  insight into deficits/self awareness;sequencing abilities;safety precautions follow-through/compliance;awareness of need for assistance;problem solving;judgment  -SC     Impairments Affecting Function (Mobility)  balance;cognition;endurance/activity tolerance;range of motion (ROM);pain;strength  -SC     Comment, Safety Issues/Impairments (Mobility)  lean posteriorly  -SC       User Key  (r) = Recorded By, (t) = Taken By, (c) = Cosigned By    Initials Name Provider Type    SC Maykel Matthew, PT Physical Therapist        Mobility     Row Name 08/18/20 1539          Bed Mobility Assessment/Treatment    Bed Mobility Assessment/Treatment  scooting/bridging;supine-sit  -SC     Scooting/Bridging [place] (Bed Mobility)  dependent (less than 25% patient effort);2 person assist  -SC     Comment (Bed Mobility)  deferred bed mobility. Patient unable to scoot forward or backwards in chair dispite cues  -SC     Row Name 08/18/20 1539          Transfer Assessment/Treatment    Comment (Transfers)  Working on STS from edge of bed. Requires repeted cues for sequencinng. Patient unable to push up from recliner. On standing Patient working on improving upright postrue with tactile and verbal cues. Iniitally with posterior lean requiring inhibition by PT  -SC     Row Name 08/18/20 1539          Bed-Chair Transfer    Bed-Chair [place] (Transfers)  dependent (less than 25% patient effort)  -SC     Assistive Device (Bed-Chair Transfers)  mechanical lift/aid  -SC     Row Name 08/18/20 1539          Sit-Stand Transfer    Sit-Stand [place] (Transfers)  2 person assist;maximum assist (25% patient effort)   -SC     Assistive Device (Sit-Stand Transfers)  walker, front-wheeled  -SC     Row Name 08/18/20 1539          Gait/Stairs Assessment/Training    Gait/Stairs Assessment/Training  gait/ambulation independence  -SC     [place] Level (Gait)  maximum assist (25% patient effort);2 person assist;verbal cues  -SC     Distance in Feet (Gait)  3  -SC     Pattern (Gait)  step-to  -SC     Deviations/Abnormal Patterns (Gait)  base of support, wide;stride length decreased  -SC     Bilateral Gait Deviations  heel strike decreased;forward flexed posture  -SC     Right Sided Gait Deviations  weight shift ability decreased  -SC     Comment (Gait/Stairs)  GT training focused on initiating steps with R leg first. Required PT assistance to advance her leg. Patient demonstrated a step to gait pattern and quickly fatigued after 4 steps. Required max assist to control walker  -SC     Row Name 08/18/20 1539          Mobility Assessment/Intervention    Extremity Weight-bearing Status  right lower extremity  -SC     Right Lower Extremity (Weight-bearing Status)  weight-bearing as tolerated (WBAT)  -SC       User Key  (r) = Recorded By, (t) = Taken By, (c) = Cosigned By    Initials Name Provider Type    SC Maykel Matthew, PT Physical Therapist        Obj/Interventions     Row Name 08/18/20 1546          Therapeutic Exercise    Lower Extremity (Therapeutic Exercise)  gastroc stretch, bilateral;LAQ (long arc quad), bilateral;quad sets, bilateral;SAQ (short arc quad), right;heel slides, bilateral  -SC     Exercise Type (Therapeutic Exercise)  AAROM (active assistive range of motion);isotonic contraction, concentric  -SC     Position (Therapeutic Exercise)  seated  -SC     Sets/Reps (Therapeutic Exercise)  10  -SC     Expected Outcome (Therapeutic Exercise)  facilitate normal movement patterns  -SC     Comment (Therapeutic Exercise)  cues for technique  -SC     Row Name 08/18/20 1558 08/18/20 1541       Dynamic Standing Balance    Level of  [place], Reaches Outside Midline (Standing, Dynamic Balance)  contact guard assist;1 person assist  -SC  maximal assist, 25 to 49% patient effort;2 person assist  -SC    Assistive Device Utilized (Supported Standing, Dynamic Balance)  --  walker, rolling  -SC    Comment, Reaches Outside Midline (Standing, Dynamic Balance)  sitting in chair working on dynamic balance activity  -SC  --      User Key  (r) = Recorded By, (t) = Taken By, (c) = Cosigned By    Initials Name Provider Type    SC Maykel Matthew, PT Physical Therapist        Goals/Plan    No documentation.       Clinical Impression     [facility] Name 08/18/20 1550          Pain Scale: Numbers Pre/Post-Treatment    Pain Location - Side  Right  -SC     Pain Location  hip  -SC     Pain Intervention(s)  Repositioned  -[facility] Name 08/18/20 5460          Pain Scale: FACES Pre/Post-Treatment    Pain: FACES Scale, Pretreatment  0-->no hurt  -SC     Pain: FACES Scale, Post-Treatment  2-->hurts little bit  -[facility] Name 08/18/20 7670          Plan of Care Review    Plan of Care Reviewed With  patient;son  -SC     Progress  improving  -SC     Outcome Summary  Patient willing to participate in therapy. She was able to stand and take a few steps with max assist.  -SC     Row Name 08/18/20 1550          Physical Therapy Clinical Impression    Criteria for Skilled Interventions Met (PT Clinical Impression)  yes;treatment indicated  -SC     Rehab Potential (PT Clinical Summary)  fair, will monitor progress closely  -[facility] Name 08/18/20 3320          Positioning and Restraints    Pre-Treatment Position  in bed  -SC     Post Treatment Position  chair  -SC     In Chair  notified nsg;reclined;encouraged to call for assist;call light within reach;sitting;with family/caregiver;exit alarm on  -SC       User Key  (r) = Recorded By, (t) = Taken By, (c) = Cosigned By    Initials Name Provider Type    SC Maykel Matthew, PT Physical Therapist        Outcome Measures     [facility]  Name 08/18/20 1554          How much help from another person do you currently need...    Turning from your back to your side while in flat bed without using bedrails?  2  -SC     Moving from lying on back to sitting on the side of a flat bed without bedrails?  2  -SC     Moving to and from a bed to a chair (including a wheelchair)?  2  -SC     Standing up from a chair using your arms (e.g., wheelchair, bedside chair)?  2  -SC     Climbing 3-5 steps with a railing?  1  -SC     To walk in hospital room?  2  -SC     AM-PAC 6 Clicks Score (PT)  11  -SC     Row Name 08/18/20 1554          Functional Assessment    Outcome Measure Options  AM-PAC 6 Clicks Basic Mobility (PT)  -SC       User Key  (r) = Recorded By, (t) = Taken By, (c) = Cosigned By    Initials Name Provider Type    SC Maykel Matthew PT Physical Therapist        Physical Therapy Education                 Title: PT OT SLP Therapies (In Progress)     Topic: Physical Therapy (In Progress)     Point: Mobility training (In Progress)     Description:   Instruct learner(s) on safety and technique for assisting patient out of bed, chair or wheelchair.  Instruct in the proper use of assistive devices, such as walker, crutches, cane or brace.              Patient Friendly Description:   It's important to get you on your feet again, but we need to do so in a way that is safe for you. Falling has serious consequences, and your personal safety is the most important thing of all.        When it's time to get out of bed, one of us or a family member will sit next to you on the bed to give you support.     If your doctor or nurse tells you to use a walker, crutches, a cane, or a brace, be sure you use it every time you get out of bed, even if you think you don't need it.    Learning Progress Summary           Patient EagFEDERICO guzman, LESLIE,NR by SC at 8/18/2020 0597    Comment:  reviewed benefits of activity    FEDERIOC GodwinD, NR by  at 8/17/2020 4816    Comment:  Educated on  weight bearing status, posterior hip precautions, correct supine to sit t/f technique, correct sit<->stand t/f technique, correct bed to chair t/f technique, and progression of POC.    Acceptance, E,D, NR by LR at 8/16/2020 1308    Comment:  Educated on posterior hip precautions, weight bearing status, correct sit<->stand t/f technique, correct posture, HEP, and progression of POC.    Acceptance, E, NR by AS at 8/15/2020 1036    Acceptance, E, NR by SC at 8/14/2020 1458    Comment:  reviewed HEP    Acceptance, E,D, VU,NR by LR at 8/13/2020 1528    Comment:  Educated on posterior hip precautions, weight bearing status, correct supine to sit t/f technique, correct sit<->stand t/f technique, safety with mobility, and progression of POC.   Family Acceptance, E,D, NR by LR at 8/16/2020 1308    Comment:  Educated on posterior hip precautions, weight bearing status, correct sit<->stand t/f technique, correct posture, HEP, and progression of POC.    Acceptance, E, NR by SC at 8/14/2020 1458    Comment:  reviewed HEP    Acceptance, E,D, VU,NR by LR at 8/13/2020 1528    Comment:  Educated on posterior hip precautions, weight bearing status, correct supine to sit t/f technique, correct sit<->stand t/f technique, safety with mobility, and progression of POC.                   Point: Home exercise program (In Progress)     Description:   Instruct learner(s) on appropriate technique for monitoring, assisting and/or progressing patient with therapeutic exercises and activities.              Learning Progress Summary           Patient Eager, E, VU,NR by SC at 8/18/2020 1555    Comment:  reviewed benefits of activity    Acceptance, E,D, NR by LR at 8/17/2020 0842    Comment:  Educated on weight bearing status, posterior hip precautions, correct supine to sit t/f technique, correct sit<->stand t/f technique, correct bed to chair t/f technique, and progression of POC.    Acceptance, E,D, NR by LR at 8/16/2020 1308    Comment:   Educated on posterior hip precautions, weight bearing status, correct sit<->stand t/f technique, correct posture, HEP, and progression of POC.    Acceptance, E, NR by AS at 8/15/2020 1036    Acceptance, E, NR by SC at 8/14/2020 1458    Comment:  reviewed HEP    Acceptance, E,D, VU,NR by LR at 8/13/2020 1528    Comment:  Educated on posterior hip precautions, weight bearing status, correct supine to sit t/f technique, correct sit<->stand t/f technique, safety with mobility, and progression of POC.   Family Acceptance, E,D, NR by LR at 8/16/2020 1308    Comment:  Educated on posterior hip precautions, weight bearing status, correct sit<->stand t/f technique, correct posture, HEP, and progression of POC.    Acceptance, E, NR by SC at 8/14/2020 1458    Comment:  reviewed HEP    Acceptance, E,D, VU,NR by LR at 8/13/2020 1528    Comment:  Educated on posterior hip precautions, weight bearing status, correct supine to sit t/f technique, correct sit<->stand t/f technique, safety with mobility, and progression of POC.                   Point: Body mechanics (In Progress)     Description:   Instruct learner(s) on proper positioning and spine alignment for patient and/or caregiver during mobility tasks and/or exercises.              Learning Progress Summary           Patient Eager, E, VU,NR by SC at 8/18/2020 1555    Comment:  reviewed benefits of activity    Acceptance, E,D, NR by LR at 8/17/2020 0842    Comment:  Educated on weight bearing status, posterior hip precautions, correct supine to sit t/f technique, correct sit<->stand t/f technique, correct bed to chair t/f technique, and progression of POC.    Acceptance, E,D, NR by LR at 8/16/2020 1308    Comment:  Educated on posterior hip precautions, weight bearing status, correct sit<->stand t/f technique, correct posture, HEP, and progression of POC.    Acceptance, E, NR by AS at 8/15/2020 1036    Acceptance, E, NR by SC at 8/14/2020 1458    Comment:  reviewed HEP     Acceptance, E,D, VU,NR by LR at 8/13/2020 1528    Comment:  Educated on posterior hip precautions, weight bearing status, correct supine to sit t/f technique, correct sit<->stand t/f technique, safety with mobility, and progression of POC.   Family Acceptance, E,D, NR by LR at 8/16/2020 1308    Comment:  Educated on posterior hip precautions, weight bearing status, correct sit<->stand t/f technique, correct posture, HEP, and progression of POC.    Acceptance, E, NR by SC at 8/14/2020 1458    Comment:  reviewed HEP    Acceptance, E,D, VU,NR by LR at 8/13/2020 1528    Comment:  Educated on posterior hip precautions, weight bearing status, correct supine to sit t/f technique, correct sit<->stand t/f technique, safety with mobility, and progression of POC.                   Point: Precautions (In Progress)     Description:   Instruct learner(s) on prescribed precautions during mobility and gait tasks              Learning Progress Summary           Patient Eager, E, VU,NR by SC at 8/18/2020 1555    Comment:  reviewed benefits of activity    Acceptance, E,D, NR by LR at 8/17/2020 0842    Comment:  Educated on weight bearing status, posterior hip precautions, correct supine to sit t/f technique, correct sit<->stand t/f technique, correct bed to chair t/f technique, and progression of POC.    Acceptance, E,D, NR by LR at 8/16/2020 1308    Comment:  Educated on posterior hip precautions, weight bearing status, correct sit<->stand t/f technique, correct posture, HEP, and progression of POC.    Acceptance, E, NR by AS at 8/15/2020 1036    Acceptance, E, NR by SC at 8/14/2020 1458    Comment:  reviewed HEP    Acceptance, E,D, VU,NR by LR at 8/13/2020 1528    Comment:  Educated on posterior hip precautions, weight bearing status, correct supine to sit t/f technique, correct sit<->stand t/f technique, safety with mobility, and progression of POC.   Family Acceptance, E,D, NR by LR at 8/16/2020 1308    Comment:  Educated on  posterior hip precautions, weight bearing status, correct sit<->stand t/f technique, correct posture, HEP, and progression of POC.    Acceptance, E, NR by SC at 8/14/2020 1458    Comment:  reviewed HEP    Acceptance, E,D, VU,NR by  at 8/13/2020 1528    Comment:  Educated on posterior hip precautions, weight bearing status, correct supine to sit t/f technique, correct sit<->stand t/f technique, safety with mobility, and progression of POC.                               User Key     Initials Effective Dates Name Provider Type Discipline    SC 06/19/15 -  Maykel Matthew, PT Physical Therapist PT    LR 06/19/15 -  Natasha Monroe, PT Physical Therapist PT    AS 06/22/15 -  Celina Jimenez, PTA Physical Therapy Assistant PT              PT Recommendation and Plan     Outcome Summary/Treatment Plan (PT)  Anticipated Discharge Disposition (PT): skilled nursing facility  Plan of Care Reviewed With: patient  Progress: improving  Outcome Summary: Patient willing to participate in therapy. She was able to stand and take a few steps with max assist.     Time Calculation:   PT Charges     Row Name 08/18/20 1506             Time Calculation    Start Time  1506  -SC      PT Received On  08/18/20  -SC      PT Goal Re-Cert Due Date  08/23/20  -SC         Time Calculation- PT    Total Timed Code Minutes- PT  24 minute(s)  -SC         Timed Charges    43960 - PT Therapeutic Exercise Minutes  9  -SC      34726 - Gait Training Minutes   5  -SC      84598 - PT Therapeutic Activity Minutes  10  -SC        User Key  (r) = Recorded By, (t) = Taken By, (c) = Cosigned By    Initials Name Provider Type    SC Maykel Matthew, PT Physical Therapist        Therapy Charges for Today     Code Description Service Date Service Provider Modifiers Qty    52376874974 HC PT THER PROC EA 15 MIN 8/18/2020 Maykel Matthew, PT GP 1    46152722004 HC PT THERAPEUTIC ACT EA 15 MIN 8/18/2020 Maykel Matthew, PT GP 1          PT G-Codes  Outcome  Measure Options: AM-PAC 6 Clicks Basic Mobility (PT)  AM-PAC 6 Clicks Score (PT): 11  AM-PAC 6 Clicks Score (OT): 10    Maykel Matthew, PT  8/18/2020

## 2020-08-19 ENCOUNTER — READMISSION MANAGEMENT (OUTPATIENT)
Dept: CALL CENTER | Facility: HOSPITAL | Age: 85
End: 2020-08-19

## 2020-08-19 VITALS
BODY MASS INDEX: 24.55 KG/M2 | DIASTOLIC BLOOD PRESSURE: 74 MMHG | RESPIRATION RATE: 18 BRPM | TEMPERATURE: 97.9 F | WEIGHT: 130 LBS | HEIGHT: 61 IN | SYSTOLIC BLOOD PRESSURE: 156 MMHG | HEART RATE: 76 BPM | OXYGEN SATURATION: 93 %

## 2020-08-19 PROBLEM — I48.91 ATRIAL FIBRILLATION WITH RVR (HCC): Status: ACTIVE | Noted: 2020-08-19

## 2020-08-19 PROBLEM — E11.9 TYPE 2 DIABETES MELLITUS WITHOUT COMPLICATION, WITHOUT LONG-TERM CURRENT USE OF INSULIN (HCC): Status: ACTIVE | Noted: 2020-08-19

## 2020-08-19 PROBLEM — R41.82 ALTERED MENTAL STATUS: Status: RESOLVED | Noted: 2020-08-11 | Resolved: 2020-08-19

## 2020-08-19 PROBLEM — J81.0 FLASH PULMONARY EDEMA (HCC): Status: RESOLVED | Noted: 2020-08-19 | Resolved: 2020-08-19

## 2020-08-19 PROBLEM — A41.51 SEPSIS DUE TO ESCHERICHIA COLI WITHOUT ACUTE ORGAN DYSFUNCTION (HCC): Status: ACTIVE | Noted: 2020-08-19

## 2020-08-19 PROBLEM — A41.51 SEPSIS DUE TO ESCHERICHIA COLI WITHOUT ACUTE ORGAN DYSFUNCTION (HCC): Status: RESOLVED | Noted: 2020-08-19 | Resolved: 2020-08-19

## 2020-08-19 PROBLEM — I48.91 ATRIAL FIBRILLATION WITH RVR (HCC): Status: RESOLVED | Noted: 2020-08-19 | Resolved: 2020-08-19

## 2020-08-19 PROBLEM — J96.01 ACUTE RESPIRATORY FAILURE WITH HYPOXIA (HCC): Status: RESOLVED | Noted: 2020-08-13 | Resolved: 2020-08-19

## 2020-08-19 PROBLEM — J81.0 FLASH PULMONARY EDEMA (HCC): Status: ACTIVE | Noted: 2020-08-19

## 2020-08-19 PROBLEM — N39.0 ACUTE UTI (URINARY TRACT INFECTION): Status: RESOLVED | Noted: 2020-08-11 | Resolved: 2020-08-19

## 2020-08-19 PROBLEM — I48.0 PAROXYSMAL ATRIAL FIBRILLATION (HCC): Status: ACTIVE | Noted: 2017-03-17

## 2020-08-19 PROBLEM — D62 POSTOPERATIVE ANEMIA DUE TO ACUTE BLOOD LOSS: Status: RESOLVED | Noted: 2020-08-19 | Resolved: 2020-08-19

## 2020-08-19 PROBLEM — D62 POSTOPERATIVE ANEMIA DUE TO ACUTE BLOOD LOSS: Status: ACTIVE | Noted: 2020-08-19

## 2020-08-19 LAB
GLUCOSE BLDC GLUCOMTR-MCNC: 155 MG/DL (ref 70–130)
GLUCOSE BLDC GLUCOMTR-MCNC: 168 MG/DL (ref 70–130)

## 2020-08-19 PROCEDURE — 63710000001 INSULIN LISPRO (HUMAN) PER 5 UNITS: Performed by: ORTHOPAEDIC SURGERY

## 2020-08-19 PROCEDURE — 63710000001 INSULIN LISPRO (HUMAN) PER 5 UNITS: Performed by: INTERNAL MEDICINE

## 2020-08-19 PROCEDURE — 82962 GLUCOSE BLOOD TEST: CPT

## 2020-08-19 PROCEDURE — 99239 HOSP IP/OBS DSCHRG MGMT >30: CPT | Performed by: PHYSICIAN ASSISTANT

## 2020-08-19 RX ORDER — HYDROCODONE BITARTRATE AND ACETAMINOPHEN 5; 325 MG/1; MG/1
1 TABLET ORAL EVERY 6 HOURS PRN
Qty: 12 TABLET | Refills: 0 | Status: SHIPPED | OUTPATIENT
Start: 2020-08-19

## 2020-08-19 RX ORDER — GABAPENTIN 300 MG/1
300 CAPSULE ORAL 3 TIMES DAILY PRN
Qty: 9 CAPSULE | Refills: 0 | Status: SHIPPED | OUTPATIENT
Start: 2020-08-19 | End: 2020-08-19 | Stop reason: SDUPTHER

## 2020-08-19 RX ORDER — ACETAMINOPHEN 325 MG/1
650 TABLET ORAL EVERY 6 HOURS PRN
Start: 2020-08-19

## 2020-08-19 RX ORDER — TERBINAFINE HYDROCHLORIDE 250 MG/1
250 TABLET ORAL DAILY
Status: ON HOLD | COMMUNITY
End: 2020-08-19 | Stop reason: SDUPTHER

## 2020-08-19 RX ORDER — TAMSULOSIN HYDROCHLORIDE 0.4 MG/1
0.4 CAPSULE ORAL DAILY
Qty: 30 CAPSULE
Start: 2020-08-20

## 2020-08-19 RX ORDER — LORATADINE 10 MG/1
10 TABLET ORAL DAILY
COMMUNITY

## 2020-08-19 RX ORDER — GABAPENTIN 300 MG/1
300 CAPSULE ORAL 3 TIMES DAILY PRN
Qty: 9 CAPSULE | Refills: 0 | Status: SHIPPED | OUTPATIENT
Start: 2020-08-19

## 2020-08-19 RX ORDER — HYDROCHLOROTHIAZIDE 12.5 MG/1
12.5 CAPSULE, GELATIN COATED ORAL DAILY
COMMUNITY
End: 2020-08-19 | Stop reason: HOSPADM

## 2020-08-19 RX ORDER — MECLIZINE HYDROCHLORIDE 25 MG/1
25 TABLET ORAL 3 TIMES DAILY PRN
COMMUNITY
End: 2020-08-19 | Stop reason: HOSPADM

## 2020-08-19 RX ORDER — MONTELUKAST SODIUM 10 MG/1
10 TABLET ORAL NIGHTLY
COMMUNITY

## 2020-08-19 RX ORDER — BISACODYL 10 MG
10 SUPPOSITORY, RECTAL RECTAL DAILY PRN
Start: 2020-08-19

## 2020-08-19 RX ORDER — LOSARTAN POTASSIUM 50 MG/1
50 TABLET ORAL DAILY
COMMUNITY

## 2020-08-19 RX ORDER — FERROUS SULFATE 325(65) MG
325 TABLET ORAL
Refills: 3
Start: 2020-08-19

## 2020-08-19 RX ORDER — TERBINAFINE HYDROCHLORIDE 250 MG/1
250 TABLET ORAL DAILY
Start: 2020-08-19 | End: 2020-10-18

## 2020-08-19 RX ORDER — HYDROXYZINE HYDROCHLORIDE 25 MG/1
12.5 TABLET, FILM COATED ORAL EVERY 6 HOURS PRN
COMMUNITY

## 2020-08-19 RX ORDER — LEVETIRACETAM 500 MG/1
500 TABLET ORAL 2 TIMES DAILY
COMMUNITY

## 2020-08-19 RX ADMIN — HYDROCODONE BITARTRATE AND ACETAMINOPHEN 1 TABLET: 5; 325 TABLET ORAL at 03:23

## 2020-08-19 RX ADMIN — INSULIN LISPRO 2 UNITS: 100 INJECTION, SOLUTION INTRAVENOUS; SUBCUTANEOUS at 08:15

## 2020-08-19 RX ADMIN — RIVAROXABAN 15 MG: 15 TABLET, FILM COATED ORAL at 08:15

## 2020-08-19 RX ADMIN — ATENOLOL 50 MG: 50 TABLET ORAL at 08:14

## 2020-08-19 RX ADMIN — INSULIN LISPRO 5 UNITS: 100 INJECTION, SOLUTION INTRAVENOUS; SUBCUTANEOUS at 08:15

## 2020-08-19 RX ADMIN — INSULIN LISPRO 5 UNITS: 100 INJECTION, SOLUTION INTRAVENOUS; SUBCUTANEOUS at 12:33

## 2020-08-19 RX ADMIN — TAMSULOSIN HYDROCHLORIDE 0.4 MG: 0.4 CAPSULE ORAL at 08:15

## 2020-08-19 RX ADMIN — INSULIN LISPRO 2 UNITS: 100 INJECTION, SOLUTION INTRAVENOUS; SUBCUTANEOUS at 12:33

## 2020-08-19 RX ADMIN — ACETAMINOPHEN 650 MG: 325 TABLET, FILM COATED ORAL at 13:03

## 2020-08-19 NOTE — PROGRESS NOTES
"[facility]  [id number]  1935    /63 (BP Location: Right arm, Patient Position: Lying)   Pulse 82   Temp 99.3 °F (37.4 °C) (Axillary)   Resp 18   Ht 154.9 cm (61\")   Wt 59 kg (130 lb)   SpO2 93%   BMI 24.56 kg/m²     Lab Results (last 24 hours)     Procedure Component Value Units Date/Time    POC Glucose Once [[id number]]  (Abnormal) Collected:  08/18/20 2019    Specimen:  Blood Updated:  08/18/20 2020     Glucose 172 mg/dL     POC Glucose Once [[id number]]  (Abnormal) Collected:  08/18/20 1631    Specimen:  Blood Updated:  08/18/20 1649     Glucose 208 mg/dL     POC Glucose Once [[id number]]  (Abnormal) Collected:  08/18/20 1136    Specimen:  Blood Updated:  08/18/20 1157     Glucose 217 mg/dL     Basic Metabolic Panel [[id number]]  (Abnormal) Collected:  08/18/20 0654    Specimen:  Blood Updated:  08/18/20 0921     Glucose 150 mg/dL      BUN 21 mg/dL      Creatinine 0.85 mg/dL      Sodium 137 mmol/L      Potassium 4.5 mmol/L      Comment: Specimen hemolyzed.  Results may be affected.        Chloride 100 mmol/L      CO2 28.0 mmol/L      Calcium 8.3 mg/dL      eGFR Non African Amer 64 mL/min/1.73      BUN/Creatinine Ratio 24.7     Anion Gap 9.0 mmol/L     Narrative:       GFR Normal >60  Chronic Kidney Disease <60  Kidney Failure <15      CBC & Differential [[id number]] Collected:  08/18/20 0654    Specimen:  Blood Updated:  08/18/20 0823    Narrative:       The following orders were created for panel order CBC & Differential.  Procedure                               Abnormality         Status                     ---------                               -----------         ------                     CBC Auto Differential[[id number]]        Abnormal            Final result                 Please view results for these tests on the individual orders.    CBC Auto Differential [[id number]]  (Abnormal) Collected:  08/18/20 0654    Specimen:  Blood Updated:  08/18/20 0823     WBC 11.31 10*3/mm3      RBC 3.43 " 10*6/mm3      Hemoglobin 9.6 g/dL      Hematocrit 32.5 %      MCV 94.8 fL      MCH 28.0 pg      MCHC 29.5 g/dL      RDW 14.9 %      RDW-SD 51.4 fl      MPV 11.3 fL      Platelets 184 10*3/mm3      Neutrophil % 70.4 %      Lymphocyte % 6.6 %      Monocyte % 14.1 %      Eosinophil % 5.7 %      Basophil % 0.9 %      Immature Grans % 2.3 %      Neutrophils, Absolute 7.95 10*3/mm3      Lymphocytes, Absolute 0.75 10*3/mm3      Monocytes, Absolute 1.60 10*3/mm3      Eosinophils, Absolute 0.65 10*3/mm3      Basophils, Absolute 0.10 10*3/mm3      Immature Grans, Absolute 0.26 10*3/mm3      nRBC 0.0 /100 WBC     POC Glucose Once [[id number]]  (Abnormal) Collected:  08/18/20 0755    Specimen:  Blood Updated:  08/18/20 0806     Glucose 174 mg/dL           Patient Care Team:  Sravan Franco APRN as PCP - General (Family Medicine)    SUBJECTIVE  Pain well controlled.    PHYSICAL EXAM  Incision benign  Minimal thigh swelling  Neurovascularly intact to knees and toes       Hip fracture (CMS/HCC)    Iron deficiency anemia    Hypertension    Atrial fibrillation (CMS/HCC)    Hyperlipidemia    CAD (coronary artery disease)    Acute UTI (urinary tract infection)    Altered mental status    Fall    Status post hip hemiarthroplasty    Acute respiratory failure with hypoxia (CMS/HCC)    Acute pulmonary embolism (CMS/HCC)    Pulmonary hypertension (CMS/HCC)    Tricuspid regurgitation      PLAN / DISPOSITION:  Hemoglobin stable - no further transfusion anticipated  Discharge to the [facility]    Barbara dressing suction tube has been removed but the primary dressing remains. The dressing itself should stay in place for another week.  We should see her for office follow-up in 3 to 4 weeks.  She may be weightbearing as tolerated on the right lower extremity.  She does not require hip precautions.    Ramirez Lopez MD  08/19/20  07:42

## 2020-08-19 NOTE — DISCHARGE SUMMARY
[facility] Medicine Services  DISCHARGE SUMMARY    Patient Name: Analilia De Leon  : 1935  MRN: [id number]    Date of Admission: 2020  7:25 PM  Date of Discharge: 2020  Primary Care Physician: Sravan Franco APRN    Consults     Date and Time Order Name Status Description    2020 0857 Inpatient Cardiology Consult Completed     2020 Inpatient Consult to Hospitalist      2020 Inpatient Orthopedic Surgery Consult Completed         Hospital Course     Presenting Problem:   Hip fracture requiring operative repair, unspecified laterality, closed, initial encounter (CMS/[facility]) [S72.009A]  Hip fracture (CMS/[facility]) [S72.009A]    Active Hospital Problems    Diagnosis  POA   • **Hip fracture (CMS/[facility]) [S72.009A]  Yes   • Type 2 diabetes mellitus without complication, without long-term current use of insulin (CMS/[facility]) [E11.9]  Yes   • Acute pulmonary embolism (CMS/[facility]) [I26.99]  Clinically Undetermined   • Pulmonary hypertension (CMS/[facility]) [I27.20]  Yes   • Tricuspid regurgitation [I07.1]  Yes   • Status post hip hemiarthroplasty (20 by Dr. Ramirez Lopez) [Z96.649]  Not Applicable   • Fall [W19.XXXA]  Yes   • Paroxysmal atrial fibrillation (CMS/[facility]) [I48.0]  Yes   • Hyperlipidemia [E78.5]  Yes   • Hypertension [I10]  Yes   • CAD (coronary artery disease) [I25.10]  Yes   • Iron deficiency anemia [D50.9]  Yes      Resolved Hospital Problems    Diagnosis Date Resolved POA   • Atrial fibrillation with RVR (CMS/[facility]) [I48.91] 2020 No   • Flash pulmonary edema (CMS/[facility]) [J81.0] 2020 No   • Sepsis due to Escherichia coli without acute organ dysfunction (CMS/[facility]) [A41.51] 2020 Yes   • Postoperative anemia due to acute blood loss [D62] 2020 No   • Acute respiratory failure with hypoxia (CMS/[facility]) [J96.01] 2020 No   • Acute UTI (urinary tract infection) [N39.0] 2020 Yes   • Altered mental status [R41.82] 2020 Yes      Hospital  Course:  Ms. Analilia De Leon is an 85yoF with PMH significant for HTN, HLD, CAD, paroxysmal atrial fibrillation (Xarelto) and non-insulin dependent DMII. She was transferred to [facility] from [facility] ED on 8/11/20 due to right femoral neck fracture with displacement and rotation and UTI. She was admitted to the hospital medicine service and orthopedic surgery was consulted. Dr. Lopez performed R hip bipolar hemiarthroplasty on 8/12/20. Postoperatively, she developed acute hypoxic respiratory failure. She was found to be in atrial fibrillation with RVR with resultant flash pulmonary edema. A CTA chest also revealed a RUL pulmonary embolism. ECHO revealed severe pulmonary hypertension, as well. Cardiology followed and assisted with diuresis.     Right femoral neck fracture due to mechanical fall  - s/p bipolar hemiarthroplasty by Dr. Lopez on 8/12/20  - Primary dressing to remain in place for one week following discharge  - Weightbearing as tolerated to RLE. No hip precautions  - Pain control PRN  - Follow up with Dr. Lopez in 3-4 weeks    Post-operative/acute blood loss anemia   - Hgb dropped to 6.8 on 8/15 - she is s/p 2 units PRBCS. Hgb remains stable     Atrial fibrillation with RVR  Paroxysmal atrial fibrillation  - CHADSVASC 5  - s/p diltizem gtt due to RVR  - Now rate-controlled on home-dose Atenolol     Acute hypoxic respiratory failure, resolved   Flash pulmonary edema, resolved   Acute pulmonary embolism  Severe pulmonary hypertension   - Appreciate cardiology evaluation, not a candidate for EKOS  - 8/13 CTA chest showed filling defect in right upper lobar artery, concerning for PE without filling defect and no evidence of right heart strain. Interstitial edema   - proBNP 9,000, flash pulmonary edema likely secondary to atrial fibrillation with RVR   - TTE showed EF 50% with severe pulmonary hypertension, tricuspid regurgitation with RVSP 83.   - s/p heparin gtt for PE. Has been  "transitioned back to Xarelto but will treat for acute PE with Xarelto 15mg PO BID x 21 days, then 20mg PO QHS (home dose was 10mg PO QHS)    Sepsis (tachycardic, leukocytosis), resolved  E. Coli UTI   - Likely due to UTI   - Blood cultures from admission NGTD  - OSH urine culture (+) pan-susceptible E. Coli  - Urine culture here was antibiotic-modified (received IV Rocephin as OSH)   - s/p Rocephin IV x 7 days     Altered mental status  - Per family pt became \"mildly\" confused after IV Narcotics given at OSH  - Initial CT head unremarkable, repeat CT head unremarkable  - ABG with only hypoxia  - Improving slowly, suspect post-op confusion was related to intraoperative sedatives      Non-insulin dependent DMII  - Hgb A1c 6.8%   - Resume home Metformin at DC      Essential hypertension, continue home meds at DC  Hyperlipidemia, continue home meds at DC     Day of Discharge     HPI:   Sitting up in bed. Feeling well and has no complaints. Pain controlled. No dyspnea and remains on room air. Feels ready to go to rehab.     Review of Systems  Gen- No fevers, chills  CV- No chest pain, palpitations  Resp- No cough, dyspnea  GI- No N/V/D, abd pain    Vital Signs:   Temp:  [97.9 °F (36.6 °C)-99.3 °F (37.4 °C)] 97.9 °F (36.6 °C)  Heart Rate:  [76-83] 76  Resp:  [18-24] 18  BP: (128-162)/(63-83) 156/74     Physical Exam:  Constitutional: No acute distress, awake, alert  HENT: NCAT, mucous membranes moist  Respiratory: Mild bibasilar rales without wheezes or rhonchi, respiratory effort normal. 98% on room air   Cardiovascular: RRR, no murmurs, rubs, or gallops, palpable pedal pulses bilaterally  Gastrointestinal: Positive bowel sounds, soft, nontender, nondistended  Musculoskeletal: No bilateral ankle edema  Psychiatric: Appropriate affect, cooperative  Neurologic: Oriented x 3, strength symmetric in all extremities, Cranial Nerves grossly intact to confrontation, speech clear  Skin: No rashes    Pertinent  and/or Most Recent " Results     Results from last 7 days   Lab Units 08/18/20  0654 08/17/20  0819 08/16/20  0649 08/15/20  0615 08/14/20  0154 08/13/20  1241 08/13/20  0507   WBC 10*3/mm3 11.31* 10.90* 12.25* 13.71* 16.29* 17.02* 21.87*   HEMOGLOBIN g/dL 9.6* 9.4* 9.6* 6.8* 7.3* 8.1* 8.6*   HEMATOCRIT % 32.5* 31.4* 31.5* 23.4* 25.4* 27.1* 28.0*   PLATELETS 10*3/mm3 184 198 187 180 163 159 243   SODIUM mmol/L 137 137 136 141  --   --  137   POTASSIUM mmol/L 4.5 3.9 4.1 4.2  --   --  5.5*   CHLORIDE mmol/L 100 104 100 108*  --   --  109*   CO2 mmol/L 28.0 25.0 23.0 22.0  --   --  19.0*   BUN mg/dL 21 24* 33* 36*  --   --  21   CREATININE mg/dL 0.85 0.78 0.88 1.03*  --   --  1.03*   GLUCOSE mg/dL 150* 124* 205* 146*  --   --  197*   CALCIUM mg/dL 8.3* 8.5* 8.6 8.6  --   --  8.6     Results from last 7 days   Lab Units 08/13/20  1241   PROTIME Seconds 17.6*   INR  1.48*   APTT seconds 32.4*     Results from last 7 days   Lab Units 08/13/20  0553 08/13/20  0507   PROBNP pg/mL  --  9,136.0*   TROPONIN T ng/mL  --  <0.010   LACTATE mmol/L 1.9  --      Brief Urine Lab Results  (Last result in the past 365 days)      Color   Clarity   Blood   Leuk Est   Nitrite   Protein   CREAT   Urine HCG        08/13/20 0627 Yellow Cloudy Moderate (2+) Moderate (2+) Negative 30 mg/dL (1+)             Microbiology Results Abnormal     Procedure Component Value - Date/Time    COVID PRE-OP / PRE-PROCEDURE SCREENING ORDER (NO ISOLATION) - Swab, Nasopharynx [[id number]] Collected:  08/17/20 9665    Lab Status:  Final result Specimen:  Swab from Nasopharynx Updated:  08/17/20 3310    Narrative:       The following orders were created for panel order COVID PRE-OP / PRE-PROCEDURE SCREENING ORDER (NO ISOLATION) - Swab, Nasopharynx.  Procedure                               Abnormality         Status                     ---------                               -----------         ------                     COVID-19, ABBOTT IN-HOUS...[[id number]]  Normal               Final result                 Please view results for these tests on the individual orders.    COVID-19, ABBOTT IN-HOUSE,NP Swab (NO TRANSPORT MEDIA) 2 HR TAT - Swab, Nasopharynx [[id number]]  (Normal) Collected:  08/17/20 1445    Lab Status:  Final result Specimen:  Swab from Nasopharynx Updated:  08/17/20 1518     COVID19 Not Detected    Narrative:       Fact sheet for providers: https://www.fda.gov/media/845735/download     Fact sheet for patients: https://www.fda.gov/media/941707/download    Blood Culture - Blood, Hand, Left [[id number]] Collected:  08/11/20 2054    Lab Status:  Final result Specimen:  Blood from Hand, Left Updated:  08/17/20 0815     Blood Culture No growth at 5 days    Blood Culture - Blood, Hand, Left [[id number]] Collected:  08/12/20 0013    Lab Status:  Final result Specimen:  Blood from Hand, Left Updated:  08/17/20 0015     Blood Culture No growth at 5 days    MRSA Screen, PCR (Inpatient) - Swab, Nares [[id number]]  (Normal) Collected:  08/14/20 1117    Lab Status:  Final result Specimen:  Swab from Nares Updated:  08/14/20 1252     MRSA PCR Negative    Narrative:       MRSA Negative    Urine Culture - Urine, Urine, Catheter In/Out [[id number]]  (Normal) Collected:  08/13/20 0627    Lab Status:  Final result Specimen:  Urine, Catheter In/Out Updated:  08/14/20 0725     Urine Culture No growth    Urine Culture - Urine, Urine, Catheter In/Out [[id number]]  (Normal) Collected:  08/12/20 0138    Lab Status:  Final result Specimen:  Urine, Catheter In/Out Updated:  08/13/20 1309     Urine Culture No growth    COVID PRE-OP / PRE-PROCEDURE SCREENING ORDER (NO ISOLATION) - Swab, Nasopharynx [[id number]] Collected:  08/12/20 0038    Lab Status:  Final result Specimen:  Swab from Nasopharynx Updated:  08/12/20 0142    Narrative:       The following orders were created for panel order COVID PRE-OP / PRE-PROCEDURE SCREENING ORDER (NO ISOLATION) - Swab, Nasopharynx.  Procedure                                Abnormality         Status                     ---------                               -----------         ------                     COVID-19, ABBOTT IN-HOUS...[[id number]]  Normal              Final result                 Please view results for these tests on the individual orders.    COVID-19, ABBOTT IN-HOUSE,NP Swab (NO TRANSPORT MEDIA) 2 HR TAT - Swab, Nasopharynx [[id number]]  (Normal) Collected:  08/12/20 0038    Lab Status:  Final result Specimen:  Swab from Nasopharynx Updated:  08/12/20 0142     COVID19 Not Detected    Narrative:       Fact sheet for providers: https://www.fda.gov/media/808274/download     Fact sheet for patients: https://www.fda.gov/media/473451/download        Imaging Results (All)     Procedure Component Value Units Date/Time    CT Head Without Contrast [[id number]] Collected:  08/13/20 0853     Updated:  08/14/20 1509    Narrative:       EXAMINATION: CT HEAD WO CONTRAST-08/13/2020:      INDICATION: Afib RVR, lethargy.      TECHNIQUE: CT head without intravenous contrast.     The radiation dose reduction device was turned on for each scan per the  ALARA (As Low as Reasonably Achievable) protocol.     COMPARISON: CT head dated 08/11/2020.     FINDINGS: The midline structures are symmetric without evidence of mass,  mass effect or midline shift. Ventricles and sulci within normal limits.  No intra-axial hemorrhage or extra-axial fluid collection. Globes and  orbits are unremarkable. The visualized paranasal sinuses and mastoid  cells are grossly clear and well pneumatized. Calvarium intact with  hyperostosis frontalis noted.       Impression:       No acute intracranial abnormality.     D:  08/13/2020  E:  08/13/2020     This report was finalized on 8/14/2020 3:06 PM by Dr. Randall Epps.       CT Angiogram Chest With & Without Contrast [[id number]] Collected:  08/13/20 0854     Updated:  08/14/20 1509    Narrative:       EXAMINATION: CT ANGIOGRAM CHEST W WO CONTRAST-08/13/2020:         INDICATION: Leukocytosis, somnolence, requiring 6L NC.      TECHNIQUE: CT angiogram chest with and without intravenous contrast  following PE protocol. 2-D reconstructions performed.     The radiation dose reduction device was turned on for each scan per the  ALARA (As Low as Reasonably Achievable) protocol.     COMPARISON: NONE.     FINDINGS: The thyroid is homogeneous. No bulky mediastinal adenopathy.  Central airways are patent. Esophagus in normal course and caliber.  Atherosclerotic nonaneurysmal thoracic aorta with patent great vessel  origins. Overall satisfactory opacification of the pulmonary arterial  tree for evaluation with right upper lobar artery of concern for filling  defect centrally series 4/image 33 for example. No evidence of right  heart strain as RV/LV ratio is within normal limits at 0.6, however,  enlarged right heart and cardiomegaly noted without pericardial  effusion. Extended lung windows demonstrate intralobular septal  thickening and opacifications of a lower lobe predominance consistent  with interstitial pulmonary edema pattern along with decompensation and  evidence of small bilateral pleural effusions. Adjacent opacifications  fairly confluent in the left lower lung concerning for atelectasis  versus airspace disease involvement. Degenerative changes of the  thoracic spine without aggressive osseous lesion. No soft tissue body  wall findings of acuity. The visualized portions of the upper abdomen  are grossly unremarkable.       Impression:       1. Filling defect within the right upper lobar artery concerning for  pulmonary embolus without central filling defect otherwise noted and no  evidence for distinct right heart strain as RV/LV ratio within normal  limits at 0.6.     2. Cardiomegaly with right heart enlargement including right atrial  enlargement, however, no pericardial effusion.     3. Smooth intralobular septal thickening of a lower lobe predominance  consistent with  interstitial edema pattern and likely decompensation  evidence as there are small bilateral pleural effusions present with  adjacent opacifications at the left lung base of atelectasis versus  airspace disease.     D:  08/13/2020  E:  08/13/2020     This report was finalized on 8/14/2020 3:06 PM by Dr. Randall Epps.       XR Chest 1 View [[id number]] Collected:  08/13/20 0707     Updated:  08/13/20 0709    Narrative:       CR Chest 1 Vw    INDICATION:   Dyspnea     COMPARISON:    8/11/2020    FINDINGS:  Single portable AP view(s) of the chest.    Support lines and tubes are unchanged.    Improved lung volumes. Continued cardiomegaly with mild pulmonary vascular congestion. Diffuse interstitial changes may represent developing edema. Slight blunting of both CP angles may represent small effusions. No pneumothorax.      Impression:       Cardiomegaly with pulmonary venous congestion and diffuse interstitial changes may represent developing CHF. Questionable small bilateral effusions.    Signer Name: PHYLLIS Macias MD   Signed: 8/13/2020 7:07 AM   Workstation Name: [facility]    [facility]    XR Hip With or Without Pelvis 2 - 3 View Right [[id number]] Collected:  08/12/20 2009     Updated:  08/12/20 2011    Narrative:       CR Hip Uni Comp Min 2 Vws RT    INDICATION:   Status post right hip replacement    COMPARISON:   8/10/2020    FINDINGS:  AP and frog-leg lateral view(s) of the right hip. The right hip replacement has been placed. There is no fracture or dislocation    Signer Name: Kush Shelby MD   Signed: 8/12/2020 8:09 PM   Workstation Name: [facility]    [facility]    CT Head Without Contrast [[id number]] Collected:  08/11/20 2330     Updated:  08/11/20 2332    Narrative:       CT Head WO    HISTORY:   Ataxia after head injury 8/10/2020.    TECHNIQUE:   Axial unenhanced head CT with multiplanar reformats. Radiation dose reduction techniques included automated  exposure control or exposure modulation based on body size. Count of known CT and cardiac nuc med studies performed in previous 12 months: 0.     COMPARISON:   4/9/2016     FINDINGS:   Ventricular size and configuration are normal. No acute infarct or hemorrhage is identified. There are no masses. There is no skull fracture.  There is atrophy with chronic small vessel ischemic disease in the white matter. Atherosclerotic calcifications  are present in the carotid siphons and vertebral arteries. Visualized paranasal sinuses and mastoid air cells are clear.      Impression:       Senescent changes without acute abnormality.    Signer Name: Quentin Angela MD   Signed: 8/11/2020 11:30 PM   Workstation Name: [facility]    [facility]    XR Chest 1 View [[id number]] Collected:  08/11/20 2147     Updated:  08/11/20 2149    Narrative:       CR Chest 1 Vw    INDICATION:   85-year-old female presenting for preoperative evaluation. Baseline chest x-ray.     COMPARISON:    None available.    FINDINGS:  Single portable AP view(s) of the chest.    No visible support lines.    There is globular cardiomegaly. Aorta demonstrates atherosclerotic change. Low lung volumes without distinct volume overload. Coarsened interstitial markings bilaterally suggestive of combination of atelectasis and probable underlying fibrosis. Probable  retrocardiac atelectasis. No distinct effusion or pneumothorax.       Impression:       1. Globular cardiomegaly low lung volumes and probable atelectasis and chronic interstitial changes. No pneumothorax.    Signer Name: Drake Mortensen MD   Signed: 8/11/2020 9:47 PM   Workstation Name: [facility]    [facility]    XR Outside Films [[id number]] Resulted:  08/11/20 2035     Updated:  08/11/20 2035    Narrative:       This procedure was auto-finalized with no dictation required.        Results for orders placed during the hospital encounter of 08/11/20   Adult  Transthoracic Echo Limited W/ Cont if Necessary Per Protocol    Narrative · Left ventricular systolic function is normal. Estimated EF = 50%.  · Severe biatrial enlargement.  · Severe tricuspid valve regurgitation is present.  · Severe pulmonary hypertension is present. Estimated right ventricular   systolic pressure from tricuspid regurgitation is markedly elevated (83   mmHg).        Discharge Details        Discharge Medications      New Medications      Instructions Start Date   acetaminophen 325 MG tablet  Commonly known as:  TYLENOL   650 mg, Oral, Every 6 Hours PRN      bisacodyl 10 MG suppository  Commonly known as:  DULCOLAX   10 mg, Rectal, Daily PRN      HYDROcodone-acetaminophen 5-325 MG per tablet  Commonly known as:  NORCO   1 tablet, Oral, Every 6 Hours PRN      magnesium hydroxide 2400 MG/10ML suspension suspension  Commonly known as:  MILK OF MAGNESIA   10 mL, Oral, Daily PRN      tamsulosin 0.4 MG capsule 24 hr capsule  Commonly known as:  FLOMAX   0.4 mg, Oral, Daily   Start Date:  August 20, 2020        Changes to Medications      Instructions Start Date   ferrous sulfate 325 (65 FE) MG tablet  What changed:    · how much to take  · how to take this  · when to take this   325 mg, Oral, Daily With Breakfast      gabapentin 300 MG capsule  Commonly known as:  NEURONTIN  What changed:  reasons to take this   300 mg, Oral, 3 Times Daily PRN      rivaroxaban 15 MG tablet  Commonly known as:  XARELTO  What changed:    · medication strength  · how much to take  · when to take this   15 mg, Oral, 2 Times Daily With Meals      rivaroxaban 20 MG tablet  Commonly known as:  XARELTO  What changed:  You were already taking a medication with the same name, and this prescription was added. Make sure you understand how and when to take each.   20 mg, Oral, Daily With Dinner, Beginning 9/6/2020   Start Date:  September 6, 2020        Continue These Medications      Instructions Start Date   atenolol 100 MG  tablet  Commonly known as:  TENORMIN   100 mg, Oral, Daily      Claritin 10 MG tablet  Generic drug:  loratadine   10 mg, Oral, Daily      hydrOXYzine 25 MG tablet  Commonly known as:  ATARAX   12.5 mg, Oral, Every 6 Hours PRN      levETIRAcetam 500 MG tablet  Commonly known as:  KEPPRA   500 mg, Oral, 2 Times Daily, Take 1 tablet (500mg) every morning, and 2 tablets (1000mg) every evening       losartan 50 MG tablet  Commonly known as:  COZAAR   50 mg, Oral, Daily      metFORMIN 500 MG tablet  Commonly known as:  GLUCOPHAGE   500 mg, Oral, 2 Times Daily With Meals      montelukast 10 MG tablet  Commonly known as:  SINGULAIR   10 mg, Oral, Nightly      terbinafine 250 MG tablet  Commonly known as:  lamiSIL   250 mg, Oral, Daily         Stop These Medications    hydroCHLOROthiazide 12.5 MG capsule  Commonly known as:  MICROZIDE     meclizine 25 MG tablet  Commonly known as:  ANTIVERT          Allergies   Allergen Reactions   • Amoxicillin    • Crestor [Rosuvastatin]    • Eliquis [Apixaban]    • Keflex [Cephalexin]    • Motrin [Ibuprofen]    • Tramadol Confusion   • Vasotec [Enalapril Maleate]      Discharge Disposition:  Home or Self Care    Diet:  Diet Order   Procedures   • Diet Regular     Activity:  Activity Instructions     Other Activity Instructions      - Primary dressing to remain in place for one week following discharge  - Weightbearing as tolerated to RLE. No hip precautions           CODE STATUS:    Code Status and Medical Interventions:   Ordered at: 08/12/20 2020     Code Status:    CPR     Medical Interventions (Level of Support Prior to Arrest):    Full     No future appointments.    Additional Instructions for the Follow-ups that You Need to Schedule     Discharge Follow-up with Specified Provider: Dr. Lopez in 3-4 weeks   As directed      To:  Dr. Lopez in 3-4 weeks             Electronically signed by Mesha Mejia PA-C, 08/19/20, 1:01 PM.    Time Spent on Discharge:  I spent 45 minutes on  this discharge activity which included: face-to-face encounter with the patient, reviewing the data in the system, coordination of the care with the nursing staff as well as consultants, documentation, and entering orders.

## 2020-08-19 NOTE — PROGRESS NOTES
Case Management Discharge Note      Final Note: Per [facility] ur[facility]ina/ natalia Froilan, they have received insurance approval and are able to accept patient today to the [facility]. Nurse to call report to [phone], GA summary to be pulled from [facility]. [facility] transport arranged for today at 1530. Please have patient at the [address], maternity entrance, by 1515. Patient has a current negative covid test.     Provided Post Acute Provider List?: Yes  Post Acute Provider List: Nursing Home  N/A Provider List Comment: Daughter requested [facility]    Destination - Selection Complete      Service Provider Request Status Selected Services Address Phone Number Fax Number    [facility] [address] , [facility] [id number] [phone]           Transportation Services  Ambulance: [facility]    Final Discharge Disposition Code: 03 - skilled nursing facility (SNF)

## 2020-08-19 NOTE — PLAN OF CARE
PT HAS NOT SLEPT. CONFUSED. TURNED FREQUENTLY. MEDICATED TIMES ONE FOR RIGHT HIP PAIN. VOIDING PER [facility]. CONTROLLED ATRIAL FIB. REMAINS ON RA.

## 2020-08-19 NOTE — OUTREACH NOTE
Prep Survey      Responses   Hindu facility patient discharged from?  [place]   Is LACE score < 7 ?  No   Eligibility  Not Eligible   What are the reasons patient is not eligible?  [facility] [[facility]]   COVID-19 Test Status  Negative   Does the patient have one of the following disease processes/diagnoses(primary or secondary)?  Other   Prep survey completed?  Yes          Radhika Conner RN

## 2020-09-15 ENCOUNTER — APPOINTMENT (OUTPATIENT)
Dept: CT IMAGING | Facility: HOSPITAL | Age: 85
End: 2020-09-15

## 2020-09-15 ENCOUNTER — APPOINTMENT (OUTPATIENT)
Dept: GENERAL RADIOLOGY | Facility: HOSPITAL | Age: 85
End: 2020-09-15

## 2020-09-15 ENCOUNTER — HOSPITAL ENCOUNTER (EMERGENCY)
Facility: HOSPITAL | Age: 85
Discharge: NURSING, REHAB OR LONG-TERM CARE | End: 2020-09-16
Attending: EMERGENCY MEDICINE | Admitting: EMERGENCY MEDICINE
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
## 2020-09-15 DIAGNOSIS — S01.01XA LACERATION OF SCALP, INITIAL ENCOUNTER: Primary | ICD-10-CM

## 2020-09-15 DIAGNOSIS — M25.551 RIGHT HIP PAIN: ICD-10-CM

## 2020-09-15 LAB
ALBUMIN SERPL-MCNC: 3.8 G/DL (ref 3.5–5.2)
ALBUMIN/GLOB SERPL: 1.7 G/DL
ALP SERPL-CCNC: 102 U/L (ref 39–117)
ALT SERPL W P-5'-P-CCNC: 8 U/L (ref 1–33)
ANION GAP SERPL CALCULATED.3IONS-SCNC: 14 MMOL/L (ref 5–15)
AST SERPL-CCNC: 21 U/L (ref 1–32)
BACTERIA UR QL AUTO: ABNORMAL /HPF
BASOPHILS # BLD AUTO: 0.05 10*3/MM3 (ref 0–0.2)
BASOPHILS NFR BLD AUTO: 0.4 % (ref 0–1.5)
BILIRUB SERPL-MCNC: 0.3 MG/DL (ref 0–1.2)
BILIRUB UR QL STRIP: NEGATIVE
BUN SERPL-MCNC: 9 MG/DL (ref 8–23)
BUN/CREAT SERPL: 9.1 (ref 7–25)
CALCIUM SPEC-SCNC: 8.9 MG/DL (ref 8.6–10.5)
CHLORIDE SERPL-SCNC: 101 MMOL/L (ref 98–107)
CLARITY UR: CLEAR
CO2 SERPL-SCNC: 25 MMOL/L (ref 22–29)
COLOR UR: YELLOW
CREAT SERPL-MCNC: 0.99 MG/DL (ref 0.57–1)
DEPRECATED RDW RBC AUTO: 52.9 FL (ref 37–54)
EOSINOPHIL # BLD AUTO: 0.34 10*3/MM3 (ref 0–0.4)
EOSINOPHIL NFR BLD AUTO: 2.8 % (ref 0.3–6.2)
ERYTHROCYTE [DISTWIDTH] IN BLOOD BY AUTOMATED COUNT: 15.5 % (ref 12.3–15.4)
GFR SERPL CREATININE-BSD FRML MDRD: 53 ML/MIN/1.73
GLOBULIN UR ELPH-MCNC: 2.2 GM/DL
GLUCOSE SERPL-MCNC: 161 MG/DL (ref 65–99)
GLUCOSE UR STRIP-MCNC: NEGATIVE MG/DL
HCT VFR BLD AUTO: 31.2 % (ref 34–46.6)
HGB BLD-MCNC: 9.2 G/DL (ref 12–15.9)
HGB UR QL STRIP.AUTO: ABNORMAL
HYALINE CASTS UR QL AUTO: ABNORMAL /LPF
IMM GRANULOCYTES # BLD AUTO: 0.1 10*3/MM3 (ref 0–0.05)
IMM GRANULOCYTES NFR BLD AUTO: 0.8 % (ref 0–0.5)
KETONES UR QL STRIP: NEGATIVE
LEUKOCYTE ESTERASE UR QL STRIP.AUTO: NEGATIVE
LYMPHOCYTES # BLD AUTO: 0.84 10*3/MM3 (ref 0.7–3.1)
LYMPHOCYTES NFR BLD AUTO: 6.9 % (ref 19.6–45.3)
MCH RBC QN AUTO: 27.6 PG (ref 26.6–33)
MCHC RBC AUTO-ENTMCNC: 29.5 G/DL (ref 31.5–35.7)
MCV RBC AUTO: 93.7 FL (ref 79–97)
MONOCYTES # BLD AUTO: 0.98 10*3/MM3 (ref 0.1–0.9)
MONOCYTES NFR BLD AUTO: 8 % (ref 5–12)
NEUTROPHILS NFR BLD AUTO: 81.1 % (ref 42.7–76)
NEUTROPHILS NFR BLD AUTO: 9.91 10*3/MM3 (ref 1.7–7)
NITRITE UR QL STRIP: NEGATIVE
NRBC BLD AUTO-RTO: 0 /100 WBC (ref 0–0.2)
PH UR STRIP.AUTO: <=5 [PH] (ref 5–8)
PLATELET # BLD AUTO: 301 10*3/MM3 (ref 140–450)
PMV BLD AUTO: 9.8 FL (ref 6–12)
POTASSIUM SERPL-SCNC: 4.7 MMOL/L (ref 3.5–5.2)
PROT SERPL-MCNC: 6 G/DL (ref 6–8.5)
PROT UR QL STRIP: NEGATIVE
RBC # BLD AUTO: 3.33 10*6/MM3 (ref 3.77–5.28)
RBC # UR: ABNORMAL /HPF
REF LAB TEST METHOD: ABNORMAL
SODIUM SERPL-SCNC: 140 MMOL/L (ref 136–145)
SP GR UR STRIP: 1.02 (ref 1–1.03)
SQUAMOUS #/AREA URNS HPF: ABNORMAL /HPF
UROBILINOGEN UR QL STRIP: ABNORMAL
WBC # BLD AUTO: 12.22 10*3/MM3 (ref 3.4–10.8)
WBC UR QL AUTO: ABNORMAL /HPF

## 2020-09-15 PROCEDURE — 73552 X-RAY EXAM OF FEMUR 2/>: CPT

## 2020-09-15 PROCEDURE — P9612 CATHETERIZE FOR URINE SPEC: HCPCS

## 2020-09-15 PROCEDURE — 81001 URINALYSIS AUTO W/SCOPE: CPT | Performed by: PHYSICIAN ASSISTANT

## 2020-09-15 PROCEDURE — 99284 EMERGENCY DEPT VISIT MOD MDM: CPT

## 2020-09-15 PROCEDURE — 72192 CT PELVIS W/O DYE: CPT

## 2020-09-15 PROCEDURE — 70450 CT HEAD/BRAIN W/O DYE: CPT

## 2020-09-15 PROCEDURE — 80053 COMPREHEN METABOLIC PANEL: CPT | Performed by: PHYSICIAN ASSISTANT

## 2020-09-15 PROCEDURE — 85025 COMPLETE CBC W/AUTO DIFF WBC: CPT | Performed by: PHYSICIAN ASSISTANT

## 2020-09-15 RX ORDER — SODIUM CHLORIDE 0.9 % (FLUSH) 0.9 %
10 SYRINGE (ML) INJECTION AS NEEDED
Status: DISCONTINUED | OUTPATIENT
Start: 2020-09-15 | End: 2020-09-16 | Stop reason: HOSPADM

## 2020-09-15 RX ORDER — LIDOCAINE HYDROCHLORIDE 10 MG/ML
5 INJECTION, SOLUTION EPIDURAL; INFILTRATION; INTRACAUDAL; PERINEURAL ONCE
Status: COMPLETED | OUTPATIENT
Start: 2020-09-15 | End: 2020-09-15

## 2020-09-15 RX ADMIN — LIDOCAINE HYDROCHLORIDE 5 ML: 10 INJECTION, SOLUTION EPIDURAL; INFILTRATION; INTRACAUDAL; PERINEURAL at 23:50

## 2020-09-16 VITALS
BODY MASS INDEX: 23.94 KG/M2 | TEMPERATURE: 97.7 F | DIASTOLIC BLOOD PRESSURE: 68 MMHG | HEART RATE: 93 BPM | RESPIRATION RATE: 16 BRPM | WEIGHT: 130.07 LBS | OXYGEN SATURATION: 91 % | SYSTOLIC BLOOD PRESSURE: 149 MMHG | HEIGHT: 62 IN

## 2020-09-16 NOTE — DISCHARGE INSTRUCTIONS
Keep wound clean.  Staple removal in 8 days.  Continue current meds and rehab.  Follow up with Dr. Lopez if right hip pain persists.

## 2020-09-16 NOTE — ED PROVIDER NOTES
"Subjective   85-year-old female arrives via EMS from [facility] where she is at for rehab after a right hemiarthroplasty (8/12/2020, Dr. Lopez).  The patient states that she fell backwards and struck her head on a dresser.  She sustained a small laceration to the left parietal scalp.  The patient complains of pain in the right hip.  EMS noted that the right foot appeared to be rotated outward.  The patient denies other injury.  Specifically, she denies neck or back pain.  No chest pain or shortness of breath.  No abdominal pain.  No upper extremity pain.  The patient has a history of hypertension, right hip hemiarthroplasty, atrial fibrillation, PE (on Xarelto), type 2 diabetes, hyperlipidemia.          Review of Systems   Constitutional: Negative for fever.   HENT: Negative for sore throat.    Respiratory: Negative for cough and shortness of breath.    Cardiovascular: Negative for chest pain.   Gastrointestinal: Negative for abdominal pain, diarrhea, nausea and vomiting.   Genitourinary: Negative for dysuria.   Musculoskeletal: Positive for arthralgias (Right hip pain).   Skin: Positive for wound (Scalp laceration).   Neurological: Negative for numbness and headaches.   Hematological: Bruises/bleeds easily (On Xarelto).   Psychiatric/Behavioral: Negative.        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   • T2DM (type 2 diabetes mellitus) (CMS/[facility])    • TIA (transient ischemic attack) 3/17/2017   • Weakness 6/6/2017       Allergies   Allergen Reactions   • Amoxicillin    • Crestor [Rosuvastatin]    • Eliquis [Apixaban]    • Keflex [Cephalexin]    • Motrin [Ibuprofen]    • Tramadol Confusion   • Vasotec [Enalapril Maleate]  "       Past Surgical History:   Procedure Laterality Date   • CHOLECYSTECTOMY     • ENDOSCOPY  04/06/2015    showed 2 ulcerated lesions in the gastric antrum   • HIP HEMIARTHROPLASTY Right 8/12/2020    Procedure: HIP BI-POLAR RIGHT;  Surgeon: Ramirez Lopez MD;  Location: [facility];  Service: Orthopedics;  Laterality: Right;   • HYSTERECTOMY     • KNEE SURGERY Right    • OOPHORECTOMY         Family History   Problem Relation Age of Onset   • Breast cancer Sister    • Breast cancer Maternal Aunt    • No Known Problems Mother        Social History     Socioeconomic History   • Marital status:      Spouse name: Not on file   • Number of children: Not on file   • Years of education: Not on file   • Highest education level: Not on file   Tobacco Use   • Smoking status: Never Smoker   • Smokeless tobacco: Never Used   Substance and Sexual Activity   • Alcohol use: Never     Frequency: Never   • Drug use: Never   • Sexual activity: Defer   Social History Narrative    Lives w/ her son.            Objective   Physical Exam  Constitutional:       General: She is not in acute distress.  HENT:      Head:      Comments: 2.5 cm scalp laceration in the left parietal region     Nose: Nose normal.      Mouth/Throat:      Mouth: Mucous membranes are moist.   Eyes:      Pupils: Pupils are equal, round, and reactive to light.   Neck:      Musculoskeletal: Normal range of motion. No muscular tenderness.   Cardiovascular:      Rate and Rhythm: Normal rate and regular rhythm.      Pulses: Normal pulses.   Pulmonary:      Effort: Pulmonary effort is normal.   Chest:      Chest wall: No tenderness.   Abdominal:      General: Bowel sounds are normal.      Palpations: Abdomen is soft.   Musculoskeletal:      Comments: The right leg appears somewhat externally rotated.  She has pain in the right hip when she attempts to raise the right leg.  She moves the left leg well.  Normal range of motion in the upper extremities.   Skin:      General: Skin is warm and dry.   Neurological:      General: No focal deficit present.      Mental Status: She is alert and oriented to person, place, and time.   Psychiatric:         Mood and Affect: Mood normal.         Laceration Repair    Date/Time: 9/16/2020 12:08 AM  Performed by: Ja Davidson PA  Authorized by: Mynor Snyder MD     Consent:     Consent obtained:  Verbal    Consent given by:  Patient    Risks discussed:  Infection and pain  Anesthesia (see MAR for exact dosages):     Anesthesia method:  Local infiltration    Local anesthetic:  Lidocaine 1% w/o epi  Laceration details:     Location:  Scalp    Scalp location:  L parietal    Length (cm):  2.5  Repair type:     Repair type:  Simple  Pre-procedure details:     Preparation:  Patient was prepped and draped in usual sterile fashion  Exploration:     Hemostasis achieved with:  Direct pressure    Wound exploration: entire depth of wound probed and visualized      Wound extent: no underlying fracture noted      Contaminated: no    Treatment:     Area cleansed with:  Betadine and saline    Amount of cleaning:  Standard    Irrigation solution:  Sterile saline    Irrigation method:  Syringe  Skin repair:     Repair method:  Staples    Number of staples:  3  Approximation:     Approximation:  Close  Post-procedure details:     Dressing:  Open (no dressing)    Patient tolerance of procedure:  Tolerated well, no immediate complications               ED Course      00:11 EDT  The pt appears in no distress.  Labs are bland.  CT head shows no fracture or acute intracranial abnormality.  CT pelvis shows right hemiarthroplasty with some scatter effect from the hardware but no definite fracture or dislocation.  Plain films of the femur shows no fracture or dislocation.  Will staple her scalp lac and plan to discharge back to [place] for continued rehab.                                     MDM    Final diagnoses:   Laceration of scalp, initial encounter    Right hip pain   Fall, initial encounter            Ja Davidson, PA  09/16/20 0012

## (undated) DEVICE — PULLOVER TOGA, 2X LARGE: Brand: FLYTE, SURGICOOL

## (undated) DEVICE — ANTIBACTERIAL UNDYED BRAIDED (POLYGLACTIN 910), SYNTHETIC ABSORBABLE SUTURE: Brand: COATED VICRYL

## (undated) DEVICE — DRAPE,TOP,102X53,STERILE: Brand: MEDLINE

## (undated) DEVICE — HLDR CATH FOL STATLOCK SWVL TRICOT

## (undated) DEVICE — GLV SURG SIGNATURE TOUCH PF LTX 8 STRL BX/50

## (undated) DEVICE — INTENDED USE FOR SURGICAL MARKING ON INTACT SKIN, ALSO PROVIDES A PERMANENT METHOD OF IDENTIFYING OBJECTS IN THE OPERATING ROOM: Brand: WRITESITE® REGULAR TIP SKIN MARKER

## (undated) DEVICE — PK HIP TOTL UNIV 10

## (undated) DEVICE — CVR HNDL LIGHT RIGID